# Patient Record
Sex: FEMALE | Race: WHITE | NOT HISPANIC OR LATINO | Employment: OTHER | URBAN - METROPOLITAN AREA
[De-identification: names, ages, dates, MRNs, and addresses within clinical notes are randomized per-mention and may not be internally consistent; named-entity substitution may affect disease eponyms.]

---

## 2018-03-08 ENCOUNTER — RECORDS - HEALTHEAST (OUTPATIENT)
Dept: LAB | Facility: HOSPITAL | Age: 83
End: 2018-03-08

## 2018-03-08 LAB
FASTING STATUS PATIENT QL REPORTED: YES
FOLATE SERPL-MCNC: 6.9 NG/ML
GLUCOSE BLD-MCNC: 126 MG/DL (ref 70–125)
HBA1C MFR BLD: 6.1 % (ref 4.2–6.1)
LYME TOTAL ANTIBODY - HISTORICAL: <0.01 INDEX VALUE
T PALLIDUM AB SER QL: NEGATIVE
TSH SERPL DL<=0.005 MIU/L-ACNC: 2.3 UIU/ML (ref 0.3–5)
VIT B12 SERPL-MCNC: 1052 PG/ML (ref 213–816)

## 2018-03-09 LAB
ALBUMIN PERCENT: 64.7 % (ref 51–67)
ALBUMIN SERPL ELPH-MCNC: 4.7 G/DL (ref 3.2–4.7)
ALPHA 1 PERCENT: 2.3 % (ref 2–4)
ALPHA 2 PERCENT: 11.5 % (ref 5–13)
ALPHA1 GLOB SERPL ELPH-MCNC: 0.2 G/DL (ref 0.1–0.3)
ALPHA2 GLOB SERPL ELPH-MCNC: 0.8 G/DL (ref 0.4–0.9)
B-GLOBULIN SERPL ELPH-MCNC: 0.8 G/DL (ref 0.7–1.2)
BETA PERCENT: 11.6 % (ref 10–17)
GAMMA GLOB SERPL ELPH-MCNC: 0.7 G/DL (ref 0.6–1.4)
GAMMA GLOBULIN PERCENT: 9.9 % (ref 9–20)
PATH ICD:: NORMAL
PATH ICD:: NORMAL
PROT PATTERN SERPL ELPH-IMP: NORMAL
PROT PATTERN SERPL IFE-IMP: NORMAL
PROT SERPL-MCNC: 7.2 G/DL (ref 6–8)
REVIEWING PATHOLOGIST: NORMAL
REVIEWING PATHOLOGIST: NORMAL

## 2018-03-10 LAB
ARSENIC, WHOLE BLOOD: <10 NG/ML
LAB SAMPLE TYPE: NORMAL
LAB STATE REPORTED TO: NORMAL
LEAD, WHOLE BLOOD - HISTORICAL: 2 UG/DL
MERCURY, WHOLE BLOOD - HISTORICAL: <5 NG/ML
METHYLMALONATE SERPL-SCNC: 0.11 UMOL/L (ref 0–0.4)
SPECIMEN STATUS: NORMAL

## 2018-03-12 LAB — ANA SER QL: 0.8 U

## 2021-12-11 ENCOUNTER — LAB REQUISITION (OUTPATIENT)
Dept: LAB | Facility: CLINIC | Age: 86
End: 2021-12-11
Payer: COMMERCIAL

## 2021-12-11 DIAGNOSIS — I10 ESSENTIAL (PRIMARY) HYPERTENSION: ICD-10-CM

## 2021-12-13 ENCOUNTER — TRANSITIONAL CARE UNIT VISIT (OUTPATIENT)
Dept: GERIATRICS | Facility: CLINIC | Age: 86
End: 2021-12-13
Payer: MEDICARE

## 2021-12-13 VITALS
OXYGEN SATURATION: 90 % | TEMPERATURE: 97.6 F | RESPIRATION RATE: 18 BRPM | HEART RATE: 85 BPM | SYSTOLIC BLOOD PRESSURE: 133 MMHG | DIASTOLIC BLOOD PRESSURE: 65 MMHG

## 2021-12-13 DIAGNOSIS — R09.02 HYPOXIA: ICD-10-CM

## 2021-12-13 DIAGNOSIS — J15.69 PNEUMONIA OF LEFT LOWER LOBE DUE TO OTHER AEROBIC GRAM-NEGATIVE BACTERIA (H): Primary | ICD-10-CM

## 2021-12-13 DIAGNOSIS — I10 PRIMARY HYPERTENSION: ICD-10-CM

## 2021-12-13 DIAGNOSIS — I48.0 PAROXYSMAL ATRIAL FIBRILLATION (H): ICD-10-CM

## 2021-12-13 DIAGNOSIS — N18.31 STAGE 3A CHRONIC KIDNEY DISEASE (H): ICD-10-CM

## 2021-12-13 DIAGNOSIS — I50.23 ACUTE ON CHRONIC SYSTOLIC CONGESTIVE HEART FAILURE (H): ICD-10-CM

## 2021-12-13 DIAGNOSIS — I27.20 PULMONARY HYPERTENSION (H): ICD-10-CM
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
## 2021-12-13 DIAGNOSIS — K52.9 CHRONIC DIARRHEA: ICD-10-CM

## 2021-12-13 PROBLEM — I47.19 PAROXYSMAL ATRIAL TACHYCARDIA (H): Status: ACTIVE | Noted: 2021-12-07

## 2021-12-13 PROBLEM — R19.7 DIARRHEA: Status: ACTIVE | Noted: 2021-04-20

## 2021-12-13 PROBLEM — E78.00 HYPERCHOLESTEREMIA: Status: ACTIVE | Noted: 2021-12-13
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
## 2021-12-13 PROBLEM — M20.42 HAMMERTOE OF LEFT FOOT: Status: ACTIVE | Noted: 2019-02-06

## 2021-12-13 PROBLEM — H90.3 SENSORINEURAL HEARING LOSS, BILATERAL: Status: ACTIVE | Noted: 2019-01-17

## 2021-12-13 PROBLEM — Z89.422 ACQUIRED ABSENCE OF OTHER LEFT TOE(S) (H): Status: ACTIVE | Noted: 2020-02-12

## 2021-12-13 PROBLEM — E87.6 HYPOKALEMIA: Status: ACTIVE | Noted: 2021-04-20

## 2021-12-13 LAB
ANION GAP SERPL CALCULATED.3IONS-SCNC: 15 MMOL/L (ref 5–18)
BUN SERPL-MCNC: 15 MG/DL (ref 8–28)
CALCIUM SERPL-MCNC: 9.8 MG/DL (ref 8.5–10.5)
CHLORIDE BLD-SCNC: 91 MMOL/L (ref 98–107)
CO2 SERPL-SCNC: 26 MMOL/L (ref 22–31)
CREAT SERPL-MCNC: 0.75 MG/DL (ref 0.6–1.1)
GFR SERPL CREATININE-BSD FRML MDRD: 72 ML/MIN/1.73M2
GLUCOSE BLD-MCNC: 154 MG/DL (ref 70–125)
POTASSIUM BLD-SCNC: 4.3 MMOL/L (ref 3.5–5)
SODIUM SERPL-SCNC: 132 MMOL/L (ref 136–145)

## 2021-12-13 PROCEDURE — P9603 ONE-WAY ALLOW PRORATED MILES: HCPCS | Performed by: FAMILY MEDICINE

## 2021-12-13 PROCEDURE — 99305 1ST NF CARE MODERATE MDM 35: CPT | Performed by: NURSE PRACTITIONER

## 2021-12-13 PROCEDURE — 80048 BASIC METABOLIC PNL TOTAL CA: CPT | Performed by: FAMILY MEDICINE

## 2021-12-13 PROCEDURE — 36415 COLL VENOUS BLD VENIPUNCTURE: CPT | Performed by: FAMILY MEDICINE

## 2021-12-13 RX ORDER — ACETAMINOPHEN 325 MG/1
325 TABLET ORAL EVERY 4 HOURS PRN
COMMUNITY

## 2021-12-13 RX ORDER — LOSARTAN POTASSIUM 25 MG/1
25 TABLET ORAL DAILY
COMMUNITY

## 2021-12-13 RX ORDER — POTASSIUM CHLORIDE 1500 MG/1
20 TABLET, EXTENDED RELEASE ORAL 2 TIMES DAILY
COMMUNITY

## 2021-12-13 RX ORDER — CALCITONIN SALMON 200 [IU]/.09ML
1 SPRAY, METERED NASAL DAILY
COMMUNITY
End: 2022-01-14

## 2021-12-13 RX ORDER — CEFDINIR 300 MG/1
300 CAPSULE ORAL 2 TIMES DAILY
COMMUNITY
End: 2021-12-28

## 2021-12-13 RX ORDER — FUROSEMIDE 20 MG
20 TABLET ORAL DAILY
COMMUNITY

## 2021-12-13 RX ORDER — DOXYCYCLINE 100 MG/1
100 CAPSULE ORAL 2 TIMES DAILY
COMMUNITY
End: 2022-01-07

## 2021-12-13 RX ORDER — LOPERAMIDE HCL 2 MG
2 CAPSULE ORAL 3 TIMES DAILY PRN
COMMUNITY

## 2021-12-13 RX ORDER — SIMVASTATIN 20 MG
20 TABLET ORAL AT BEDTIME
COMMUNITY

## 2021-12-13 RX ORDER — DICYCLOMINE HCL 20 MG
20 TABLET ORAL 4 TIMES DAILY
COMMUNITY
End: 2021-12-28

## 2021-12-13 RX ORDER — LORAZEPAM 1 MG/1
1 TABLET ORAL 2 TIMES DAILY PRN
COMMUNITY

## 2021-12-13 RX ORDER — CARVEDILOL 12.5 MG/1
12.5 TABLET ORAL 2 TIMES DAILY WITH MEALS
COMMUNITY
End: 2022-01-14

## 2021-12-13 RX ORDER — KETOCONAZOLE 20 MG/G
CREAM TOPICAL 2 TIMES DAILY PRN
COMMUNITY
End: 2021-12-28

## 2021-12-13 RX ORDER — ALBUTEROL SULFATE 0.83 MG/ML
2.5 SOLUTION RESPIRATORY (INHALATION) 4 TIMES DAILY PRN
COMMUNITY
End: 2022-02-03

## 2021-12-14 ENCOUNTER — LAB REQUISITION (OUTPATIENT)
Dept: LAB | Facility: CLINIC | Age: 86
End: 2021-12-14

## 2021-12-14 LAB
ERYTHROCYTE [DISTWIDTH] IN BLOOD BY AUTOMATED COUNT: 13.6 % (ref 10–15)
HCT VFR BLD AUTO: 33.2 % (ref 35–47)
HGB BLD-MCNC: 10.7 G/DL (ref 11.7–15.7)
MCH RBC QN AUTO: 27.2 PG (ref 26.5–33)
MCHC RBC AUTO-ENTMCNC: 32.2 G/DL (ref 31.5–36.5)
MCV RBC AUTO: 85 FL (ref 78–100)
PLATELET # BLD AUTO: 428 10E3/UL (ref 150–450)
PROCALCITONIN SERPL-MCNC: 0.31 NG/ML (ref 0–0.49)
RBC # BLD AUTO: 3.93 10E6/UL (ref 3.8–5.2)
WBC # BLD AUTO: 11.7 10E3/UL (ref 4–11)

## 2021-12-14 PROCEDURE — 84145 PROCALCITONIN (PCT): CPT | Performed by: NURSE PRACTITIONER

## 2021-12-14 PROCEDURE — 85027 COMPLETE CBC AUTOMATED: CPT | Performed by: NURSE PRACTITIONER

## 2021-12-14 RX ORDER — FLUTICASONE PROPIONATE 50 MCG
2 SPRAY, SUSPENSION (ML) NASAL DAILY
COMMUNITY

## 2021-12-15 NOTE — PROGRESS NOTES
[facility] GERIATRIC SERVICES    Code Status:  FULL CODE   Visit Type:   Chief Complaint   Patient presents with     Nursing Home Acute     TCU Admmission     Facility:  [facility] ([facility]) [[id number]]           History of Present Illness: Maranda Downing is a 86 year old female who I am seeing today for admit to the TCU.  Patient recently hospitalized at [facility] on 12/7/2021 secondary to a fall.  Past medical history includes hyperlipidemia, hypertension, GERD, diverticulosis, chronic diarrhea, CAD, pulmonary hypertension, anxiety and stage III kidney disease.  Patient had a mechanical fall.  She underwent head CT which showed mild diffuse parenchymal volume loss and white matter changes most likely due to chronic microvascular ischemic disease.  Patient also underwent chest x-ray secondary to hypoxia with O2 sats in the 80s.  She was found to have a left lower lobe pneumonia.  She was placed on Omnicef and doxycycline.  COVID-19 negative.  Patient also had some tachycardia.  EKG showed significant atrial fib with rapid ventricular response with premature ventricular and aberrantly conducted complexes left axis deviation and right bundle branch block with T wave abnormality.  Cardiology was consulted.  Patient was treated with Lasix for possible congestive heart failure as well.  Magnesium and potassium supplemented.  Patient did continue with hypoxia which was suspected to be secondary to pulmonary hypertension and diastolic congestive heart failure along with possible pneumonia.  Cardiology did sign off.  Recommendation was to follow-up in the pulmonary clinic in 1 week.  Patient with chronic diarrhea.  She continues on Bentyl and Imodium.  Family had noticed some increasing cognitive impairment over the last few months.    Today patient sitting up in wheelchair.  Her daughter is present on exam.  Patient had some continued hypoxia over the weekend with sats noted to be down in the 70s.  She  continues on 4 to 5 L of oxygen.  She does sound somewhat stuffy in the upper sinuses on today's visit.  She has some diminished sounds in the bases.  Dry cough on exam.  She does have underlying anxiety.  With talking she does appear to get anxious.  She does take lorazepam at home and continues on that twice daily as well as sertraline.  Patient is currently afebrile.  She continues on Omnicef and doxycycline.  Last CBC showed a normal white blood cell count.  Today's BMP unremarkable.  She does have as needed nebulizers.  Nursing staff have been utilizing this throughout the weekend.  CHF.  Patient continues on Lasix.  Trace lower extremity edema.  Weights over the weekend have been stable.  Blood pressure appears satisfactory.  Patient reports the diarrhea has improved.  Again continues on Bentyl and as needed Imodium.  Patient tolerated therapy today.  Sats dropped in the mid 80s however did rebound with rest.  I did discuss at length patient's overall condition.  Also discussed possible need for further hospitalization if sats do not stay above 90.    Active Ambulatory Problems     Diagnosis Date Noted     Controlled type 2 diabetes mellitus with complication, without long-term current use of insulin (H) 02/11/2013     Chronic diarrhea 12/07/2021     Anxiety state 01/06/2011     Allergic rhinitis 05/22/2009     AK (actinic keratosis) 01/24/2013     Acquired absence of other left toe(s) (H) 02/12/2020     Abdominal pain 06/16/2014     Coronary artery disease of native artery of native heart with stable angina pectoris (H) 05/20/2009     Cystitis cystica 05/22/2009     Diarrhea 04/20/2021     Dysesthesia 05/22/2009     Fall 12/07/2021     Fatty liver 01/16/2012     Fibromyalgia 08/02/2012     Gastroesophageal reflux disease without esophagitis 07/28/2011     Genital herpes 03/13/2015     Hammertoe of left foot 02/06/2019     Hypercalcemia 01/24/2013     Hypercholesteremia 12/13/2021     Hypertension 12/13/2021      Hypokalemia 04/20/2021     Hypoxia 12/07/2021     Osteoporosis 05/22/2009     OA (osteoarthritis) of knee 03/14/2013     Nausea & vomiting 06/16/2014     Mild cognitive impairment 06/29/2009     Midline thoracic back pain 06/23/2015     Irritable bowel syndrome 05/22/2009     Incontinence of feces 03/12/2014     Vitamin B 12 deficiency 05/22/2009     Stage 3a chronic kidney disease (H) 01/29/2021     Sensorineural hearing loss, bilateral 01/17/2019     Rosacea 05/22/2009     Right bundle branch block 03/20/2014     Pulmonary hypertension (H) 01/29/2021     PPD positive 06/29/2009     Paroxysmal atrial tachycardia (H) 12/07/2021     Resolved Ambulatory Problems     Diagnosis Date Noted     No Resolved Ambulatory Problems     No Additional Past Medical History       Current Outpatient Medications:      acetaminophen (TYLENOL) 325 MG tablet, Take 325 mg by mouth every 4 hours as needed for mild pain, Disp: , Rfl:      albuterol (PROVENTIL) (2.5 MG/3ML) 0.083% neb solution, Take 2.5 mg by nebulization 4 times daily as needed for shortness of breath / dyspnea or wheezing Also give scheduled 3 times daily, Disp: , Rfl:      calcitonin, salmon, (MIACALCIN) 200 UNIT/ACT nasal spray, Spray 1 spray into one nostril alternating nostrils daily Alternate nostril each day., Disp: , Rfl:      carvedilol (COREG) 12.5 MG tablet, Take 12.5 mg by mouth 2 times daily (with meals), Disp: , Rfl:      cefdinir (OMNICEF) 300 MG capsule, Take 300 mg by mouth 2 times daily, Disp: , Rfl:      dicyclomine (BENTYL) 20 MG tablet, Take 20 mg by mouth 4 times daily, Disp: , Rfl:      doxycycline monohydrate (MONODOX) 100 MG capsule, Take 100 mg by mouth 2 times daily, Disp: , Rfl:      fluticasone (FLONASE) 50 MCG/ACT nasal spray, 2 sprays daily, Disp: , Rfl:      fluticasone (FLOVENT DISKUS) 50 MCG/BLIST inhaler, Inhale 1 puff into the lungs daily as needed, Disp: , Rfl:      furosemide (LASIX) 20 MG tablet, Take 20 mg by mouth daily, Disp: ,  Rfl:      ketoconazole (NIZORAL) 2 % external cream, Apply topically 2 times daily as needed for itching, Disp: , Rfl:      loperamide (IMODIUM) 2 MG capsule, Take 2 mg by mouth 3 times daily as needed for diarrhea, Disp: , Rfl:      LORazepam (ATIVAN) 1 MG tablet, Take 1 mg by mouth 2 times daily as needed for anxiety, Disp: , Rfl:      losartan (COZAAR) 25 MG tablet, Take 25 mg by mouth 2 times daily, Disp: , Rfl:      potassium chloride ER (K-TAB) 20 MEQ CR tablet, Take 20 mEq by mouth 2 times daily, Disp: , Rfl:      sertraline (ZOLOFT) 50 MG tablet, Take 50 mg by mouth daily, Disp: , Rfl:      simvastatin (ZOCOR) 20 MG tablet, Take 20 mg by mouth At Bedtime, Disp: , Rfl:   Allergies   Allergen Reactions     Codeine Nausea     Hydrocodone      Other reaction(s): GI Upset  nausea     Levofloxacin      Other reaction(s): Intolerance-Can't Take  Made leg pain worse.     Lisinopril Cough     Morphine Nausea and Vomiting     Sulfa Drugs Nausea     Penicillins Rash       All Meds and Allergies reviewed in the record at the facility and is the most up-to-date    REVIEW OF SYSTEMS:   Review of Systems  No fevers or chills. No headache, lightheadedness or dizziness. +SOB, patient continues on oxygen at 4 L, no chest pains or palpitations. Appetite is fair. No nausea, vomiting, constipation.  Chronic diarrhea on Bentyl and as needed Imodium. No dysuria, frequency, burning or pain with urination.  Chronic anxiety on lorazepam and sertraline.  Otherwise review of systems are negative.     PHYSICAL EXAMINATION:  Physical Exam     Vital signs: /65   Pulse 85   Temp 97.6  F (36.4  C)   Resp 18   SpO2 90%   General: Awake, Alert, oriented x1, appropriately, follows simple commands, conversant  HEENT:PERRLA, Pink conjunctiva, anicteric sclerae, dry oral mucosa  NECK: Supple  CVS:  S1  S2, without murmur or gallop.   LUNG: Clear to auscultation, slightly diminished in the bases.  Continues on oxygen at 4 L.  Hypoxia  noted with exertion.  BACK: No kyphosis of the thoracic spine  ABDOMEN: Soft, obese, nontender to palpation, with positive bowel sounds  EXTREMITIES: Moves both upper and lower extremities with diffuse weakness, no pedal edema, no calf tenderness  SKIN: Warm and dry, no rashes or erythema noted  NEUROLOGIC: Intact, pulses palpable  PSYCHIATRIC: Cognitive impairment noted.  Word finding difficulty.      Labs:  All labs reviewed in the nursing home record and Epic   @  Lab Results   Component Value Date    WBC 11.7 12/14/2021     Lab Results   Component Value Date    RBC 3.93 12/14/2021     Lab Results   Component Value Date    HGB 10.7 12/14/2021     Lab Results   Component Value Date    HCT 33.2 12/14/2021     Lab Results   Component Value Date    MCV 85 12/14/2021     Lab Results   Component Value Date    MCH 27.2 12/14/2021     Lab Results   Component Value Date    MCHC 32.2 12/14/2021     Lab Results   Component Value Date    RDW 13.6 12/14/2021     Lab Results   Component Value Date     12/14/2021        @Last Comprehensive Metabolic Panel:  Sodium   Date Value Ref Range Status   12/13/2021 132 (L) 136 - 145 mmol/L Final     Potassium   Date Value Ref Range Status   12/13/2021 4.3 3.5 - 5.0 mmol/L Final     Chloride   Date Value Ref Range Status   12/13/2021 91 (L) 98 - 107 mmol/L Final     Carbon Dioxide (CO2)   Date Value Ref Range Status   12/13/2021 26 22 - 31 mmol/L Final     Anion Gap   Date Value Ref Range Status   12/13/2021 15 5 - 18 mmol/L Final     Glucose   Date Value Ref Range Status   12/13/2021 154 (H) 70 - 125 mg/dL Final     Urea Nitrogen   Date Value Ref Range Status   12/13/2021 15 8 - 28 mg/dL Final     Creatinine   Date Value Ref Range Status   12/13/2021 0.75 0.60 - 1.10 mg/dL Final     GFR Estimate   Date Value Ref Range Status   12/13/2021 72 >60 mL/min/1.73m2 Final     Comment:     As of July 11, 2021, eGFR is calculated by the CKD-EPI creatinine equation, without race adjustment.  eGFR can be influenced by muscle mass, exercise, and diet. The reported eGFR is an estimation only and is only applicable if the renal function is stable.     Calcium   Date Value Ref Range Status   12/13/2021 9.8 8.5 - 10.5 mg/dL Final         Assessment/Plan:    ICD-10-CM    1. Pneumonia of left lower lobe due to other aerobic gram-negative bacteria (H)  J15.6  currently afebrile.  Patient continues on Omnicef and cefdinir.  Continues with hypoxia.  O2 on 4 L.  Repeat chest x-ray two-view.  Schedule nebulizer 3 times daily.  Continue every 4 hours as needed.  Follow-up CBC, pro calcitonin and BMP.   2. Fall, subsequent encounter  W19.XXXD  continue with therapy.  PT OT eval and treat.     3. Paroxysmal atrial fibrillation (H)  I48.0  rate controlled with carvedilol.   4. Acute on chronic systolic congestive heart failure (H)  I50.23  continues on Lasix.  No lower extremity edema.  Continue daily weights.  Follow-up BMP.   5. Primary hypertension  I10  BP satisfactory.   6. Pulmonary hypertension (H)  I27.20 suggested follow-up with pulmonology in 1 week if no improvement.   7. Stage 3a chronic kidney disease (H)  N18.31 creatinine 0.75.  Creatinine 0.75.  Follow-up BMP.   8. Hypoxia  R09.02  encourage cough and deep breathing.  I-S every 4 hours while awake.  Attempt to wean O2.  Flonase to each nostril 2 sprays twice daily.  She does have some nasal stuffiness which may be contributory.   9. Chronic diarrhea  K52.9  continues on Bentyl, fiber and as needed Imodium.           45 minutes spent of which greater than 50% was face to face communication with the patient and her daughter about above plan of care including management of pneumonia, hypoxia and continued monitoring.    This note has been dictated using voice recognition software. Any grammatical or context distortions are unintentional and inherent to the software    Electronically signed by: Teresa Villa CNP

## 2021-12-26 ENCOUNTER — LAB REQUISITION (OUTPATIENT)
Dept: LAB | Facility: CLINIC | Age: 86
End: 2021-12-26
Payer: COMMERCIAL

## 2021-12-26 ENCOUNTER — LAB REQUISITION (OUTPATIENT)
Dept: LAB | Facility: CLINIC | Age: 86
End: 2021-12-26

## 2021-12-26 DIAGNOSIS — I47.9 PAROXYSMAL TACHYCARDIA, UNSPECIFIED (H): ICD-10-CM

## 2021-12-26 DIAGNOSIS — J18.9 PNEUMONIA, UNSPECIFIED ORGANISM: ICD-10-CM

## 2021-12-26 DIAGNOSIS — J30.2 OTHER SEASONAL ALLERGIC RHINITIS: ICD-10-CM

## 2021-12-26 DIAGNOSIS — N39.0 URINARY TRACT INFECTION, SITE NOT SPECIFIED: ICD-10-CM

## 2021-12-26 DIAGNOSIS — K58.9 IRRITABLE BOWEL SYNDROME WITHOUT DIARRHEA: ICD-10-CM

## 2021-12-26 DIAGNOSIS — L30.9 DERMATITIS, UNSPECIFIED: ICD-10-CM

## 2021-12-26 DIAGNOSIS — Z11.1 ENCOUNTER FOR SCREENING FOR RESPIRATORY TUBERCULOSIS: ICD-10-CM

## 2021-12-26 DIAGNOSIS — K52.9 NONINFECTIVE GASTROENTERITIS AND COLITIS, UNSPECIFIED: ICD-10-CM

## 2021-12-26 LAB
ALBUMIN UR-MCNC: 10 MG/DL
APPEARANCE UR: ABNORMAL
BACTERIA #/AREA URNS HPF: ABNORMAL /HPF
BILIRUB UR QL STRIP: NEGATIVE
CAOX CRY #/AREA URNS HPF: ABNORMAL /HPF
COLOR UR AUTO: YELLOW
GLUCOSE UR STRIP-MCNC: NEGATIVE MG/DL
HGB UR QL STRIP: NEGATIVE
KETONES UR STRIP-MCNC: NEGATIVE MG/DL
LEUKOCYTE ESTERASE UR QL STRIP: ABNORMAL
MUCOUS THREADS #/AREA URNS LPF: PRESENT /LPF
NITRATE UR QL: POSITIVE
PH UR STRIP: 5.5 [PH] (ref 5–7)
RBC URINE: 1 /HPF
SP GR UR STRIP: 1.01 (ref 1–1.03)
SQUAMOUS EPITHELIAL: <1 /HPF
TRANSITIONAL EPI: <1 /HPF
UROBILINOGEN UR STRIP-MCNC: <2 MG/DL
WBC URINE: 9 /HPF
YEAST #/AREA URNS HPF: ABNORMAL /HPF
YEAST #/AREA URNS HPF: ABNORMAL /HPF

## 2021-12-26 PROCEDURE — 81001 URINALYSIS AUTO W/SCOPE: CPT | Performed by: FAMILY MEDICINE

## 2021-12-26 PROCEDURE — 87086 URINE CULTURE/COLONY COUNT: CPT | Performed by: FAMILY MEDICINE

## 2021-12-27 VITALS
SYSTOLIC BLOOD PRESSURE: 114 MMHG | WEIGHT: 163.4 LBS | OXYGEN SATURATION: 95 % | HEIGHT: 62 IN | DIASTOLIC BLOOD PRESSURE: 72 MMHG | HEART RATE: 87 BPM | BODY MASS INDEX: 30.07 KG/M2 | TEMPERATURE: 97.5 F | RESPIRATION RATE: 14 BRPM

## 2021-12-27 PROBLEM — R79.89 ELEVATED TROPONIN: Status: ACTIVE | Noted: 2021-12-14

## 2021-12-27 PROBLEM — K52.9 NONINFECTIOUS GASTROENTERITIS: Status: ACTIVE | Noted: 2021-04-25

## 2021-12-27 PROBLEM — K57.90 DIVERTICULAR DISEASE: Status: ACTIVE | Noted: 2021-12-27

## 2021-12-27 PROBLEM — K59.00 CONSTIPATION: Status: ACTIVE | Noted: 2021-04-20

## 2021-12-27 ASSESSMENT — MIFFLIN-ST. JEOR: SCORE: 1134.43

## 2021-12-28 ENCOUNTER — LAB REQUISITION (OUTPATIENT)
Dept: LAB | Facility: CLINIC | Age: 86
End: 2021-12-28
Payer: COMMERCIAL

## 2021-12-28 ENCOUNTER — TRANSITIONAL CARE UNIT VISIT (OUTPATIENT)
Dept: GERIATRICS | Facility: CLINIC | Age: 86
End: 2021-12-28
Payer: MEDICARE

## 2021-12-28 DIAGNOSIS — J96.21 ACUTE AND CHRONIC RESPIRATORY FAILURE WITH HYPOXIA (H): ICD-10-CM

## 2021-12-28 DIAGNOSIS — I50.33 ACUTE ON CHRONIC DIASTOLIC CONGESTIVE HEART FAILURE (H): Primary | ICD-10-CM

## 2021-12-28 DIAGNOSIS — N18.30 STAGE 3 CHRONIC KIDNEY DISEASE, UNSPECIFIED WHETHER STAGE 3A OR 3B CKD (H): ICD-10-CM

## 2021-12-28 DIAGNOSIS — Z87.01 HISTORY OF ASPIRATION PNEUMONIA: ICD-10-CM

## 2021-12-28 DIAGNOSIS — G31.84 MILD COGNITIVE IMPAIRMENT: ICD-10-CM

## 2021-12-28 DIAGNOSIS — I10 ESSENTIAL (PRIMARY) HYPERTENSION: ICD-10-CM

## 2021-12-28 DIAGNOSIS — R33.9 URINARY RETENTION: ICD-10-CM

## 2021-12-28 LAB — BACTERIA UR CULT: ABNORMAL

## 2021-12-28 PROCEDURE — P9603 ONE-WAY ALLOW PRORATED MILES: HCPCS | Performed by: FAMILY MEDICINE

## 2021-12-28 PROCEDURE — 86481 TB AG RESPONSE T-CELL SUSP: CPT | Performed by: FAMILY MEDICINE

## 2021-12-28 PROCEDURE — 36415 COLL VENOUS BLD VENIPUNCTURE: CPT | Performed by: FAMILY MEDICINE

## 2021-12-28 PROCEDURE — 99305 1ST NF CARE MODERATE MDM 35: CPT | Performed by: FAMILY MEDICINE

## 2021-12-28 RX ORDER — LACTOBACILLUS RHAMNOSUS GG 10B CELL
1 CAPSULE ORAL 2 TIMES DAILY
COMMUNITY
End: 2022-02-03

## 2021-12-28 NOTE — PROGRESS NOTES
[facility] GERIATRIC SERVICES    Facility:  [facility] ([facility]) [[id number]]  Code Status: DNR      CHIEF COMPLAINT/REASON FOR VISIT:  Chief Complaint   Patient presents with     Hospital F/U       HPI:   Maranda is a 86 year old female who was recently admitted to the hospital on 12/14/2020 when she had a previous hospital admission for pneumonia and was admitted from 12 7-12 10.  At that time she was diagnosed aspiration pneumonia did have a fall without fractures.  She finished a course of antibiotics at this time return to the hospital 4 days after discharge working diagnosis of aspiration pneumonia.  She also had acute chronic diastolic heart failure she was diuresed at this time and discharged back to the TCU on 4 L of oxygen she is currently on 3 at this time satting fine.  She does have persistent hypoxia and seems to be improving.  They did translate to oral Lasix 20 mg daily and Coreg 12.5 mg daily.  There is no significant change in her x-ray at this time pulmonology did follow.    She was then treated appropriate transferred here to the TCU in stable condition.  This morning she is lying in bed she is comfortable.  Not a very good historian but says she did have some shortness of breath yesterday and gets shortness of breath with exertion.  O2 sats look good at this time at 96% with an adequate respiratory rate.  She also has a Driver catheter in at this time I do not know what the history of this is however it does say it was removed and suspect patient is a chronic retainer due to atonic bladder.  Has episodes of incontinence.  Bentyl was discontinued secondary concerns for side effect of urinary retention at this time and she still maintains a Driver catheter at this time.    She otherwise has no pain issues and is pretty comfortable.  She does not appear to be fluid overloaded at this time.    Past Medical History:  History reviewed. No pertinent past medical history.        Surgical History:  History  reviewed. No pertinent surgical history.    Family History:   History reviewed. No pertinent family history.    Social History:    Social History     Socioeconomic History     Marital status:      Spouse name: None     Number of children: None     Years of education: None     Highest education level: None   Occupational History     None   Tobacco Use     Smoking status: Unknown If Ever Smoked     Smokeless tobacco: Never Used   Substance and Sexual Activity     Alcohol use: None     Drug use: None     Sexual activity: None   Other Topics Concern     None   Social History Narrative     None     Social Determinants of Health     Financial Resource Strain: Not on file   Food Insecurity: Not on file   Transportation Needs: Not on file   Physical Activity: Not on file   Stress: Not on file   Social Connections: Not on file   Intimate Partner Violence: Not on file   Housing Stability: Not on file       Post Discharge Medication Reconciliation Status: discharge medications reconciled, continue medications without change    Current Outpatient Medications   Medication Sig     acetaminophen (TYLENOL) 325 MG tablet Take 325 mg by mouth every 4 hours as needed for mild pain     albuterol (PROVENTIL) (2.5 MG/3ML) 0.083% neb solution Take 2.5 mg by nebulization 4 times daily as needed for shortness of breath / dyspnea or wheezing Also give scheduled 3 times daily     calcitonin, salmon, (MIACALCIN) 200 UNIT/ACT nasal spray Spray 1 spray into one nostril alternating nostrils daily Alternate nostril each day.     carvedilol (COREG) 12.5 MG tablet Take 12.5 mg by mouth 2 times daily (with meals)     doxycycline monohydrate (MONODOX) 100 MG capsule Take 100 mg by mouth 2 times daily     fluticasone (FLONASE) 50 MCG/ACT nasal spray 2 sprays daily     furosemide (LASIX) 20 MG tablet Take 20 mg by mouth daily     lactobacillus rhamnosus, GG, (CULTURELL) capsule Take 1 capsule by mouth 2 times daily     loperamide (IMODIUM) 2 MG  capsule Take 2 mg by mouth 3 times daily as needed for diarrhea     LORazepam (ATIVAN) 1 MG tablet Take 1 mg by mouth 2 times daily as needed for anxiety     losartan (COZAAR) 25 MG tablet Take 25 mg by mouth 2 times daily     potassium chloride ER (K-TAB) 20 MEQ CR tablet Take 20 mEq by mouth 2 times daily     sertraline (ZOLOFT) 50 MG tablet Take 50 mg by mouth daily     simvastatin (ZOCOR) 20 MG tablet Take 20 mg by mouth At Bedtime     No current facility-administered medications for this visit.       REVIEW OF SYSTEM: Patient claims she has shortness of breath with exertion but no chest pain or tightness.  She has no fevers chills nausea vomit diarrhea change in vision hearing taste or smell weakness one-sided other.  The remainder review of systems is negative.    PHYSICAL EXAM: Patient is alert pleasant does not appear to be in acute distress head is normocephalic and atraumatic sclera conjunctiva is clear oromucosa was moist nose no discharge.  Heart sounds were irregularly irregular with adequate rate control.  Lungs did show diffuse crackles throughout the lung field.  But moving air well at this time and no signs of any wheezing.    Extremities did show some swelling but no signs of any pitting edema or lymphedema at this time.  Neurologic exam is nonfocal and affect was pleasant.        LABS: Hospital labs are as follows; C. difficile is negative in the hospital.  Sodium is 135, potassium 4.4, CO2 is 27, calcium is 9.6, BUN is 33, creatinine is 1.11 but was as high as 1.41 in the hospital.  And also the potassium was up to 5.7 in the hospital on 12/20/2021 C. difficile was negative.    Vitals; blood pressure 135/84    Pulse is 109    Temperature is 97.6    Respirations 14    O2 sats 96%.      ASSESSMENT:    Encounter Diagnoses   Name Primary?     Acute on chronic diastolic congestive heart failure (H) Yes     Acute and chronic respiratory failure with hypoxia (H)      Urinary retention      History of  aspiration pneumonia      Stage 3 chronic kidney disease, unspecified whether stage 3a or 3b CKD (H)      Mild cognitive impairment         PLAN: Plan at this time #1 blood sugars daily at alternating times secondary diagnosis of diabetes.    We will check a basic metabolic profile Thursday secondary chronic kidney disease.    Speech therapy evaluate and treat second aspiration pneumonia and will also perform cognitive testing on her.    Pulses will be done manually every 8 hours.    We will check weights daily and staff will report to me 2 pound weight changes of any direction.    We will give trial of O2 2.5 L I did turn that down today and I did discuss with the nurse that they will check for low oxygen levels for possibly going up however I am confident that I can take her off the O2.    I also set up urology consult for voiding trial still unclear at this time on the history of the Driver catheter.  However she did not have it a month ago according to her.    I will continue to monitor above medical problems and no other changes to care plan at this time.  Care plan was reviewed and is appropriate.        Electronically signed by: TYRELL CARRIZALES DO

## 2021-12-28 NOTE — LETTER
12/28/2021        RE: Maranda Downing  [address]  [place] [address]        [facility] GERIATRIC SERVICES    Facility:  [facility] [place] ([facility]) [[id number]]  Code Status: DNR      CHIEF COMPLAINT/REASON FOR VISIT:  Chief Complaint   Patient presents with     Hospital F/U       HPI:   Maranda is a 86 year old female who was recently admitted to the hospital on 12/14/2020 when she had a previous hospital admission for pneumonia and was admitted from 12 7-12 10.  At that time she was diagnosed aspiration pneumonia did have a fall without fractures.  She finished a course of antibiotics at this time return to the hospital 4 days after discharge working diagnosis of aspiration pneumonia.  She also had acute chronic diastolic heart failure she was diuresed at this time and discharged back to the TCU on 4 L of oxygen she is currently on 3 at this time satting fine.  She does have persistent hypoxia and seems to be improving.  They did translate to oral Lasix 20 mg daily and Coreg 12.5 mg daily.  There is no significant change in her x-ray at this time pulmonology did follow.    She was then treated appropriate transferred here to the TCU in stable condition.  This morning she is lying in bed she is comfortable.  Not a very good historian but says she did have some shortness of breath yesterday and gets shortness of breath with exertion.  O2 sats look good at this time at 96% with an adequate respiratory rate.  She also has a Driver catheter in at this time I do not know what the history of this is however it does say it was removed and suspect patient is a chronic retainer due to atonic bladder.  Has episodes of incontinence.  Bentyl was discontinued secondary concerns for side effect of urinary retention at this time and she still maintains a Driver catheter at this time.    She otherwise has no pain issues and is pretty comfortable.  She does not appear to be fluid overloaded at this time.    Past Medical  History:  History reviewed. No pertinent past medical history.        Surgical History:  History reviewed. No pertinent surgical history.    Family History:   History reviewed. No pertinent family history.    Social History:    Social History     Socioeconomic History     Marital status:      Spouse name: None     Number of children: None     Years of education: None     Highest education level: None   Occupational History     None   Tobacco Use     Smoking status: Unknown If Ever Smoked     Smokeless tobacco: Never Used   Substance and Sexual Activity     Alcohol use: None     Drug use: None     Sexual activity: None   Other Topics Concern     None   Social History Narrative     None     Social Determinants of Health     Financial Resource Strain: Not on file   Food Insecurity: Not on file   Transportation Needs: Not on file   Physical Activity: Not on file   Stress: Not on file   Social Connections: Not on file   Intimate Partner Violence: Not on file   Housing Stability: Not on file       Post Discharge Medication Reconciliation Status: discharge medications reconciled, continue medications without change    Current Outpatient Medications   Medication Sig     acetaminophen (TYLENOL) 325 MG tablet Take 325 mg by mouth every 4 hours as needed for mild pain     albuterol (PROVENTIL) (2.5 MG/3ML) 0.083% neb solution Take 2.5 mg by nebulization 4 times daily as needed for shortness of breath / dyspnea or wheezing Also give scheduled 3 times daily     calcitonin, salmon, (MIACALCIN) 200 UNIT/ACT nasal spray Spray 1 spray into one nostril alternating nostrils daily Alternate nostril each day.     carvedilol (COREG) 12.5 MG tablet Take 12.5 mg by mouth 2 times daily (with meals)     doxycycline monohydrate (MONODOX) 100 MG capsule Take 100 mg by mouth 2 times daily     fluticasone (FLONASE) 50 MCG/ACT nasal spray 2 sprays daily     furosemide (LASIX) 20 MG tablet Take 20 mg by mouth daily     lactobacillus  rhamnosus, GG, (CULTURELL) capsule Take 1 capsule by mouth 2 times daily     loperamide (IMODIUM) 2 MG capsule Take 2 mg by mouth 3 times daily as needed for diarrhea     LORazepam (ATIVAN) 1 MG tablet Take 1 mg by mouth 2 times daily as needed for anxiety     losartan (COZAAR) 25 MG tablet Take 25 mg by mouth 2 times daily     potassium chloride ER (K-TAB) 20 MEQ CR tablet Take 20 mEq by mouth 2 times daily     sertraline (ZOLOFT) 50 MG tablet Take 50 mg by mouth daily     simvastatin (ZOCOR) 20 MG tablet Take 20 mg by mouth At Bedtime     No current facility-administered medications for this visit.       REVIEW OF SYSTEM: Patient claims she has shortness of breath with exertion but no chest pain or tightness.  She has no fevers chills nausea vomit diarrhea change in vision hearing taste or smell weakness one-sided other.  The remainder review of systems is negative.    PHYSICAL EXAM: Patient is alert pleasant does not appear to be in acute distress head is normocephalic and atraumatic sclera conjunctiva is clear oromucosa was moist nose no discharge.  Heart sounds were irregularly irregular with adequate rate control.  Lungs did show diffuse crackles throughout the lung field.  But moving air well at this time and no signs of any wheezing.    Extremities did show some swelling but no signs of any pitting edema or lymphedema at this time.  Neurologic exam is nonfocal and affect was pleasant.        LABS: Hospital labs are as follows; C. difficile is negative in the hospital.  Sodium is 135, potassium 4.4, CO2 is 27, calcium is 9.6, BUN is 33, creatinine is 1.11 but was as high as 1.41 in the hospital.  And also the potassium was up to 5.7 in the hospital on 12/20/2021 C. difficile was negative.    Vitals; blood pressure 135/84    Pulse is 109    Temperature is 97.6    Respirations 14    O2 sats 96%.      ASSESSMENT:    Encounter Diagnoses   Name Primary?     Acute on chronic diastolic congestive heart failure (H)  Yes     Acute and chronic respiratory failure with hypoxia (H)      Urinary retention      History of aspiration pneumonia      Stage 3 chronic kidney disease, unspecified whether stage 3a or 3b CKD (H)      Mild cognitive impairment         PLAN: Plan at this time #1 blood sugars daily at alternating times secondary diagnosis of diabetes.    We will check a basic metabolic profile Thursday secondary chronic kidney disease.    Speech therapy evaluate and treat second aspiration pneumonia and will also perform cognitive testing on her.    Pulses will be done manually every 8 hours.    We will check weights daily and staff will report to me 2 pound weight changes of any direction.    We will give trial of O2 2.5 L I did turn that down today and I did discuss with the nurse that they will check for low oxygen levels for possibly going up however I am confident that I can take her off the O2.    I also set up urology consult for voiding trial still unclear at this time on the history of the Driver catheter.  However she did not have it a month ago according to her.    I will continue to monitor above medical problems and no other changes to care plan at this time.  Care plan was reviewed and is appropriate.        Electronically signed by: TYRELL ACRRIZALES DO        Sincerely,        TYRELL CARRIZALES DO

## 2021-12-29 ENCOUNTER — LAB REQUISITION (OUTPATIENT)
Dept: LAB | Facility: CLINIC | Age: 86
End: 2021-12-29
Payer: COMMERCIAL

## 2021-12-29 ENCOUNTER — TRANSITIONAL CARE UNIT VISIT (OUTPATIENT)
Dept: GERIATRICS | Facility: CLINIC | Age: 86
End: 2021-12-29
Payer: MEDICARE

## 2021-12-29 VITALS
OXYGEN SATURATION: 95 % | BODY MASS INDEX: 29.81 KG/M2 | HEART RATE: 80 BPM | RESPIRATION RATE: 18 BRPM | DIASTOLIC BLOOD PRESSURE: 62 MMHG | HEIGHT: 62 IN | SYSTOLIC BLOOD PRESSURE: 103 MMHG | TEMPERATURE: 97.3 F | WEIGHT: 162 LBS

## 2021-12-29 DIAGNOSIS — N18.30 STAGE 3 CHRONIC KIDNEY DISEASE, UNSPECIFIED WHETHER STAGE 3A OR 3B CKD (H): ICD-10-CM

## 2021-12-29 DIAGNOSIS — J96.21 ACUTE AND CHRONIC RESPIRATORY FAILURE WITH HYPOXIA (H): ICD-10-CM

## 2021-12-29 DIAGNOSIS — R33.9 URINARY RETENTION: Primary | ICD-10-CM

## 2021-12-29 DIAGNOSIS — N30.00 ACUTE CYSTITIS WITHOUT HEMATURIA: ICD-10-CM

## 2021-12-29 DIAGNOSIS — Z87.01 HISTORY OF ASPIRATION PNEUMONIA: ICD-10-CM

## 2021-12-29 DIAGNOSIS — I48.0 PAROXYSMAL ATRIAL FIBRILLATION (H): ICD-10-CM

## 2021-12-29 DIAGNOSIS — I50.33 ACUTE ON CHRONIC DIASTOLIC CONGESTIVE HEART FAILURE (H): ICD-10-CM

## 2021-12-29 DIAGNOSIS — I10 ESSENTIAL (PRIMARY) HYPERTENSION: ICD-10-CM

## 2021-12-29 LAB
GAMMA INTERFERON BACKGROUND BLD IA-ACNC: 0.06 IU/ML
M TB IFN-G BLD-IMP: NEGATIVE
M TB IFN-G CD4+ BCKGRND COR BLD-ACNC: 6.73 IU/ML
MITOGEN IGNF BCKGRD COR BLD-ACNC: 0 IU/ML
MITOGEN IGNF BCKGRD COR BLD-ACNC: 0.01 IU/ML
QUANTIFERON MITOGEN: 6.79 IU/ML
QUANTIFERON NIL TUBE: 0.06 IU/ML
QUANTIFERON TB1 TUBE: 0.07 IU/ML
QUANTIFERON TB2 TUBE: 0.06

## 2021-12-29 PROCEDURE — 99309 SBSQ NF CARE MODERATE MDM 30: CPT | Performed by: FAMILY MEDICINE

## 2021-12-29 ASSESSMENT — MIFFLIN-ST. JEOR: SCORE: 1128.08

## 2021-12-29 NOTE — PROGRESS NOTES
[facility] GERIATRIC SERVICES    Facility:  [facility] [place] ([facility]) [[id number]]  Code Status: DNR      CHIEF COMPLAINT/REASON FOR VISIT:  Chief Complaint   Patient presents with     RECHECK       HISTORY:      HPI: Maranda is a 86 year old female who I am seen today for urinary tract infection.  She does have a chronic Driver catheter in at this time please see my note from yesterday for further details.  She has pneumonia and she also is aspiration she continues with 3 L of oxygen a try was done yesterday 2.5 L she did make it.  She has had some symptoms of urinary tract infection with some urgency does have a Driver catheter in but seems to be moving urine well urine is darker yellow without any signs of blood.  She is comfortable yesterday however she is symptomatic.    She should get short of breath with exertion but otherwise no other issues.    History reviewed. No pertinent past medical history.          History reviewed. No pertinent family history.  Social History     Socioeconomic History     Marital status:      Spouse name: Not on file     Number of children: Not on file     Years of education: Not on file     Highest education level: Not on file   Occupational History     Not on file   Tobacco Use     Smoking status: Unknown If Ever Smoked     Smokeless tobacco: Never Used   Substance and Sexual Activity     Alcohol use: Not on file     Drug use: Not on file     Sexual activity: Not on file   Other Topics Concern     Not on file   Social History Narrative     Not on file     Social Determinants of Health     Financial Resource Strain: Not on file   Food Insecurity: Not on file   Transportation Needs: Not on file   Physical Activity: Not on file   Stress: Not on file   Social Connections: Not on file   Intimate Partner Violence: Not on file   Housing Stability: Not on file         REVIEW OF SYSTEM: Positive suprapubic tenderness with urgency with urination.  He does a Driver catheter in.  She is  shortness of breath with exertion but none at rest and denies any fevers chills nausea vomit diarrhea change in vision hearing taste or smell weakness one-sided E other.  Remainder review of systems is negative.      PHYSICAL EXAM: Patient is alert pleasant does not appear to be in acute stress head is normocephalic and atraumatic sclera conjunctiva is clear oromucosa is moist nose at discharge.  Heart sounds were irregular regular with adequate rate control.  Abdomen nominal exam showed that she did have some suprapubic tenderness at this time.  But no rebound or guarding.  Bowel sounds are positive in all 4 quadrants.        LABS: Urine culture done from Saturday grew out 100,000 Klebsiella pneumoniae.  Was sensitive to a lot of antibiotics which is a lot of allergies but is sensitive to Cipro which she can take.  Over the weekend they did call the on-call and the on-call started her on nitrofurantoin which is intermediate.      ASSESSMENT:   Encounter Diagnoses   Name Primary?     Urinary retention Yes     Acute cystitis without hematuria      History of aspiration pneumonia      Stage 3 chronic kidney disease, unspecified whether stage 3a or 3b CKD (H)      Paroxysmal atrial fibrillation (H)      Acute and chronic respiratory failure with hypoxia (H)      Acute on chronic diastolic congestive heart failure (H)         PLAN: Treatment at this time will start ciprofloxacin 250 mg twice daily x7 days.  We will continue to monitor above medical problems.  Monitor bowel movements and monitor for any signs of adverse reaction.  I did discuss with her detail about adverse reaction the only adverse reaction to lisinopril she feels she might had upset stomach.  There is no signs of rash and I did discuss with her and her daughter and there was no history of anaphylaxis with any of these medications.    I will continue to monitor above medical problems and no other changes to care plan at this time.        Electronically  signed by: TYRELL CARRIZALES DO

## 2021-12-29 NOTE — LETTER
12/29/2021        RE: Maranda Downing  [address]  [place] [address]        [facility]    Facility:  [facility] ([facility]) [[id number]]  Code Status: DNR      CHIEF COMPLAINT/REASON FOR VISIT:  Chief Complaint   Patient presents with     RECHECK       HISTORY:      HPI: Maranda is a 86 year old female who I am seen today for urinary tract infection.  She does have a chronic Driver catheter in at this time please see my note from yesterday for further details.  She has pneumonia and she also is aspiration she continues with 3 L of oxygen a try was done yesterday 2.5 L she did make it.  She has had some symptoms of urinary tract infection with some urgency does have a Driver catheter in but seems to be moving urine well urine is darker yellow without any signs of blood.  She is comfortable yesterday however she is symptomatic.    She should get short of breath with exertion but otherwise no other issues.    History reviewed. No pertinent past medical history.          History reviewed. No pertinent family history.  Social History     Socioeconomic History     Marital status:      Spouse name: Not on file     Number of children: Not on file     Years of education: Not on file     Highest education level: Not on file   Occupational History     Not on file   Tobacco Use     Smoking status: Unknown If Ever Smoked     Smokeless tobacco: Never Used   Substance and Sexual Activity     Alcohol use: Not on file     Drug use: Not on file     Sexual activity: Not on file   Other Topics Concern     Not on file   Social History Narrative     Not on file     Social Determinants of Health     Financial Resource Strain: Not on file   Food Insecurity: Not on file   Transportation Needs: Not on file   Physical Activity: Not on file   Stress: Not on file   Social Connections: Not on file   Intimate Partner Violence: Not on file   Housing Stability: Not on file         REVIEW OF SYSTEM: Positive  suprapubic tenderness with urgency with urination.  He does a Driver catheter in.  She is shortness of breath with exertion but none at rest and denies any fevers chills nausea vomit diarrhea change in vision hearing taste or smell weakness one-sided E other.  Remainder review of systems is negative.      PHYSICAL EXAM: Patient is alert pleasant does not appear to be in acute stress head is normocephalic and atraumatic sclera conjunctiva is clear oromucosa is moist nose at discharge.  Heart sounds were irregular regular with adequate rate control.  Abdomen nominal exam showed that she did have some suprapubic tenderness at this time.  But no rebound or guarding.  Bowel sounds are positive in all 4 quadrants.        LABS: Urine culture done from Saturday grew out 100,000 Klebsiella pneumoniae.  Was sensitive to a lot of antibiotics which is a lot of allergies but is sensitive to Cipro which she can take.  Over the weekend they did call the on-call and the on-call started her on nitrofurantoin which is intermediate.      ASSESSMENT:   Encounter Diagnoses   Name Primary?     Urinary retention Yes     Acute cystitis without hematuria      History of aspiration pneumonia      Stage 3 chronic kidney disease, unspecified whether stage 3a or 3b CKD (H)      Paroxysmal atrial fibrillation (H)      Acute and chronic respiratory failure with hypoxia (H)      Acute on chronic diastolic congestive heart failure (H)         PLAN: Treatment at this time will start ciprofloxacin 250 mg twice daily x7 days.  We will continue to monitor above medical problems.  Monitor bowel movements and monitor for any signs of adverse reaction.  I did discuss with her detail about adverse reaction the only adverse reaction to lisinopril she feels she might had upset stomach.  There is no signs of rash and I did discuss with her and her daughter and there was no history of anaphylaxis with any of these medications.    I will continue to monitor  above medical problems and no other changes to care plan at this time.        Electronically signed by: TYRELL CARRIZALES DO        Sincerely,        TYRELL CARRIZALES DO

## 2021-12-30 ENCOUNTER — LAB REQUISITION (OUTPATIENT)
Dept: LAB | Facility: CLINIC | Age: 86
End: 2021-12-30
Payer: COMMERCIAL

## 2021-12-30 ENCOUNTER — TRANSITIONAL CARE UNIT VISIT (OUTPATIENT)
Dept: GERIATRICS | Facility: CLINIC | Age: 86
End: 2021-12-30
Payer: MEDICARE

## 2021-12-30 VITALS
DIASTOLIC BLOOD PRESSURE: 60 MMHG | RESPIRATION RATE: 18 BRPM | TEMPERATURE: 97.6 F | OXYGEN SATURATION: 94 % | WEIGHT: 162 LBS | SYSTOLIC BLOOD PRESSURE: 111 MMHG | HEIGHT: 62 IN | BODY MASS INDEX: 29.81 KG/M2 | HEART RATE: 74 BPM

## 2021-12-30 DIAGNOSIS — I10 ESSENTIAL (PRIMARY) HYPERTENSION: ICD-10-CM

## 2021-12-30 DIAGNOSIS — R33.9 URINARY RETENTION: Primary | ICD-10-CM

## 2021-12-30 DIAGNOSIS — Z87.01 HISTORY OF ASPIRATION PNEUMONIA: ICD-10-CM

## 2021-12-30 DIAGNOSIS — N18.30 STAGE 3 CHRONIC KIDNEY DISEASE, UNSPECIFIED WHETHER STAGE 3A OR 3B CKD (H): ICD-10-CM

## 2021-12-30 DIAGNOSIS — N30.00 ACUTE CYSTITIS WITHOUT HEMATURIA: ICD-10-CM

## 2021-12-30 PROCEDURE — 99309 SBSQ NF CARE MODERATE MDM 30: CPT | Performed by: FAMILY MEDICINE

## 2021-12-30 ASSESSMENT — MIFFLIN-ST. JEOR: SCORE: 1128.08

## 2021-12-30 NOTE — PROGRESS NOTES
[facility] GERIATRIC SERVICES    Facility:  [facility] [place] ([facility]) [[id number]]  Code Status: DNR      CHIEF COMPLAINT/REASON FOR VISIT:  Chief Complaint   Patient presents with     RECHECK       HISTORY:      HPI: Maranda is a 86 year old female who I did see a couple of days ago for urinary tract infection did start Cipro.  I am here to check up on her side effects of his medications she still has aspiration morning she still continues on 3 L of oxygen.  We will try to get a 2.5 but she became hypoxic.  She still continue to use the spirometry at this time.  And overall staff have no new concerns.    Patient is having increased cough and congestion today but lungs are sounding pretty clear.  She continues on 3 L of oxygen at this time and she is on Cipro for antibiotic coverage at this time.  She has no other concerns.    No past medical history on file.          No family history on file.  Social History     Socioeconomic History     Marital status:      Spouse name: Not on file     Number of children: Not on file     Years of education: Not on file     Highest education level: Not on file   Occupational History     Not on file   Tobacco Use     Smoking status: Unknown If Ever Smoked     Smokeless tobacco: Never Used   Substance and Sexual Activity     Alcohol use: Not on file     Drug use: Not on file     Sexual activity: Not on file   Other Topics Concern     Not on file   Social History Narrative     Not on file     Social Determinants of Health     Financial Resource Strain: Not on file   Food Insecurity: Not on file   Transportation Needs: Not on file   Physical Activity: Not on file   Stress: Not on file   Social Connections: Not on file   Intimate Partner Violence: Not on file   Housing Stability: Not on file         REVIEW OF SYSTEM: Cough and congestion this morning but no fevers chills.  She did have some vomiting but basically from cough and no nausea associated with this.  She is moving her  bowels well and urinating without difficulty and the main review of systems is negative.      PHYSICAL EXAM: Patient is alert pleasant does not appear to be acute distress head is normocephalic and atraumatic sclera conjunctive is clear oromucosa is moist nasal discharge.  Heart sounds are regular lungs were clear to auscultation no signs of any crackles rales or wheezes.  Abdomen soft nontender.  No signs of suprapubic tenderness.  Neurologic exam is baseline and affect is pleasant.        LABS: Urinalysis and urine culture did grow Klebsiella and she did start Cipro yesterday.    Vitals; blood pressure 92/64    Pulse is 94    Temperature is 97.6    Respirations 18    O2 sats 99%.      ASSESSMENT:   Encounter Diagnoses   Name Primary?     Urinary retention Yes     Acute cystitis without hematuria      History of aspiration pneumonia      Stage 3 chronic kidney disease, unspecified whether stage 3a or 3b CKD (H)         PLAN: Plan at this time we will continue antibiotics at this time and to monitor respiratory status.  Given the fact she is coughing congested we will keep her 3 L at this time however I do want to start to titrate down to 2.5 L.    I will discussed with the nurse to try to give her a trial today and no other changes to care plan at this time.  Monday will get a basic metabolic profile.         Electronically signed by: TYRELL CARRIZALES DO

## 2021-12-31 LAB
ANION GAP SERPL CALCULATED.3IONS-SCNC: 9 MMOL/L (ref 5–18)
BNP SERPL-MCNC: 69 PG/ML (ref 0–167)
BUN SERPL-MCNC: 31 MG/DL (ref 8–28)
CALCIUM SERPL-MCNC: 9.5 MG/DL (ref 8.5–10.5)
CHLORIDE BLD-SCNC: 98 MMOL/L (ref 98–107)
CO2 SERPL-SCNC: 26 MMOL/L (ref 22–31)
CREAT SERPL-MCNC: 1.08 MG/DL (ref 0.6–1.1)
GFR SERPL CREATININE-BSD FRML MDRD: 50 ML/MIN/1.73M2
GLUCOSE BLD-MCNC: 94 MG/DL (ref 70–125)
POTASSIUM BLD-SCNC: 4.5 MMOL/L (ref 3.5–5)
SODIUM SERPL-SCNC: 133 MMOL/L (ref 136–145)

## 2021-12-31 PROCEDURE — 83880 ASSAY OF NATRIURETIC PEPTIDE: CPT | Performed by: FAMILY MEDICINE

## 2021-12-31 PROCEDURE — P9604 ONE-WAY ALLOW PRORATED TRIP: HCPCS | Performed by: FAMILY MEDICINE

## 2021-12-31 PROCEDURE — 36415 COLL VENOUS BLD VENIPUNCTURE: CPT | Performed by: FAMILY MEDICINE

## 2021-12-31 PROCEDURE — 80048 BASIC METABOLIC PNL TOTAL CA: CPT | Performed by: FAMILY MEDICINE

## 2022-01-03 ENCOUNTER — TRANSITIONAL CARE UNIT VISIT (OUTPATIENT)
Dept: GERIATRICS | Facility: CLINIC | Age: 87
End: 2022-01-03
Payer: MEDICARE

## 2022-01-03 VITALS
OXYGEN SATURATION: 93 % | WEIGHT: 160.6 LBS | SYSTOLIC BLOOD PRESSURE: 101 MMHG | DIASTOLIC BLOOD PRESSURE: 66 MMHG | RESPIRATION RATE: 20 BRPM | HEART RATE: 78 BPM | BODY MASS INDEX: 29.37 KG/M2 | TEMPERATURE: 97.5 F

## 2022-01-03 DIAGNOSIS — I50.33 ACUTE ON CHRONIC DIASTOLIC CONGESTIVE HEART FAILURE (H): Primary | ICD-10-CM

## 2022-01-03 DIAGNOSIS — I48.0 PAROXYSMAL ATRIAL FIBRILLATION (H): ICD-10-CM

## 2022-01-03 DIAGNOSIS — J96.21 ACUTE AND CHRONIC RESPIRATORY FAILURE WITH HYPOXIA (H): ICD-10-CM

## 2022-01-03 DIAGNOSIS — R33.9 URINARY RETENTION: ICD-10-CM

## 2022-01-03 DIAGNOSIS — N18.30 STAGE 3 CHRONIC KIDNEY DISEASE, UNSPECIFIED WHETHER STAGE 3A OR 3B CKD (H): ICD-10-CM

## 2022-01-03 DIAGNOSIS — E11.22 TYPE 2 DIABETES MELLITUS WITH DIABETIC CHRONIC KIDNEY DISEASE, UNSPECIFIED CKD STAGE, UNSPECIFIED WHETHER LONG TERM INSULIN USE (H): ICD-10-CM

## 2022-01-03 DIAGNOSIS — J15.69 PNEUMONIA OF LEFT LOWER LOBE DUE TO OTHER AEROBIC GRAM-NEGATIVE BACTERIA (H): ICD-10-CM

## 2022-01-03 PROBLEM — N30.00 ACUTE CYSTITIS WITHOUT HEMATURIA: Status: ACTIVE | Noted: 2022-01-03

## 2022-01-03 PROBLEM — Z91.199 FAILURE TO ATTEND APPOINTMENT: Status: ACTIVE | Noted: 2022-01-03

## 2022-01-03 PROBLEM — Z87.01 HISTORY OF ASPIRATION PNEUMONIA: Status: ACTIVE | Noted: 2022-01-03

## 2022-01-03 PROCEDURE — 99309 SBSQ NF CARE MODERATE MDM 30: CPT | Performed by: NURSE PRACTITIONER

## 2022-01-03 RX ORDER — CIPROFLOXACIN 250 MG/1
250 TABLET, FILM COATED ORAL 2 TIMES DAILY
COMMUNITY
Start: 2021-12-29 | End: 2022-01-03

## 2022-01-04 NOTE — PROGRESS NOTES
[facility] GERIATRIC SERVICES    Code Status:  FULL CODE   Visit Type:   Chief Complaint   Patient presents with     RECHECK     Facility:  [facility] ([facility]) [[id number]]           History of Present Illness: Maranda Downing is a 86 year old female who I am seeing today for follow-up on the TCU.  Patient initially hospitalized at [facility] from 12/7 to 12/10/2021 after a mechanical fall.  Patient found to have atrial fib with rapid ventricular response as well as right bundle branch block and T wave abnormality.  She also had left lower lobe pneumonia.  She was treated with 7 days of antibiotics and transitioned onto oral Omnicef and doxycycline.  Patient discharged to the TCU on 4 L of oxygen.  2 days after admit to the TCU she was rehospitalized with acute and worsening progressive hypoxia.  She also had a brief episode of chest pain.  She had completed her antibiotics.  She was 75% on room air.  Covid 19 test was negative.  Patient found to have CHF with an elevated BNP of 900 with mild elevated troponin.  She had extensive bilateral pulmonary infiltrates.  She was treated with IV Lasix.  She continued on oral Coreg.  She was transitioned to oral diuretics.  Hypercholesterolemia on statin.  Mild cognitive impairment.  Hypertension treated with losartan and Coreg.  Type 2 diabetes with long-term use of insulin.  She was also treated with sliding scale.  Pulmonary hampered tension.  History noted.  Stage III chronic kidney disease.  Creatinine remained stable following diuresis.  Proximal atrial tachycardia.  Patient continued on losartan, beta-blocker and apixaban for chronic anticoagulation.  Elevated troponin most likely from demand ischemia.    On today's visit patient sitting up in wheelchair.  She continues on oxygen at 2 L.  Cough is now dry.  She denies any chest pain.  She reports her shortness of breath is improving.  She does report low endurance.  Will attempt to wean off oxygen.  She  continues on Cipro with last dose being today and doxycycline with last dose being on 1/4/2022.  Patient was previously having some diarrhea.  C. difficile was negative.  She is on probiotic.  Nursing staff also reporting patient burping up and complaining of heartburn.  Patient continues with Driver catheter for urinary retention during hospitalization.  Will attempt to wean off.  Sodium 133.  Recent CBC unremarkable.  Patient continues on daily weights.  Weights have been stable.    Active Ambulatory Problems     Diagnosis Date Noted     Controlled type 2 diabetes mellitus with complication, without long-term current use of insulin (H) 02/11/2013     Chronic diarrhea 12/07/2021     Anxiety state 01/06/2011     Allergic rhinitis 05/22/2009     AK (actinic keratosis) 01/24/2013     Acquired absence of other left toe(s) (H) 02/12/2020     Abdominal pain 06/16/2014     Coronary artery disease of native artery of native heart with stable angina pectoris (H) 05/20/2009     Cystitis cystica 05/22/2009     Constipation 04/20/2021     Dysesthesia 05/22/2009     Fall 12/07/2021     Fatty liver 01/16/2012     Fibromyalgia 08/02/2012     Gastroesophageal reflux disease without esophagitis 07/28/2011     Genital herpes 03/13/2015     Hammertoe of left foot 02/06/2019     Hypercalcemia 01/24/2013     Hypercholesteremia 12/13/2021     Hypertension 12/13/2021     Hypokalemia 04/20/2021     Hypoxia 12/07/2021     Osteoporosis 05/22/2009     OA (osteoarthritis) of knee 03/14/2013     Nausea & vomiting 06/16/2014     Mild cognitive impairment 06/29/2009     Midline thoracic back pain 06/23/2015     Irritable bowel syndrome 05/22/2009     Overflow diarrhea 03/12/2014     Vitamin B 12 deficiency 05/22/2009     Stage 3 chronic kidney disease, unspecified whether stage 3a or 3b CKD (H) 01/29/2021     Sensorineural hearing loss, bilateral 01/17/2019     Rosacea 05/22/2009     Right bundle branch block 03/20/2014     Pulmonary hypertension  (H) 01/29/2021     PPD positive 06/29/2009     Paroxysmal atrial tachycardia (H) 12/07/2021     Noninfectious gastroenteritis 04/25/2021     Elevated troponin 12/14/2021     Diverticular disease 12/27/2021     Acute on chronic diastolic congestive heart failure (H) 12/14/2021     Urinary retention 01/03/2022     Acute cystitis without hematuria 01/03/2022     History of aspiration pneumonia 01/03/2022     Paroxysmal atrial fibrillation (H) 01/03/2022     Acute and chronic respiratory failure with hypoxia (H) 01/03/2022     Failure to attend appointment 01/03/2022     Resolved Ambulatory Problems     Diagnosis Date Noted     No Resolved Ambulatory Problems     No Additional Past Medical History       Current Outpatient Medications:      ciprofloxacin (CIPRO) 250 MG tablet, Take 250 mg by mouth 2 times daily, Disp: , Rfl:      acetaminophen (TYLENOL) 325 MG tablet, Take 325 mg by mouth every 4 hours as needed for mild pain, Disp: , Rfl:      albuterol (PROVENTIL) (2.5 MG/3ML) 0.083% neb solution, Take 2.5 mg by nebulization 4 times daily as needed for shortness of breath / dyspnea or wheezing Also give scheduled 3 times daily, Disp: , Rfl:      calcitonin, salmon, (MIACALCIN) 200 UNIT/ACT nasal spray, Spray 1 spray into one nostril alternating nostrils daily Alternate nostril each day., Disp: , Rfl:      carvedilol (COREG) 12.5 MG tablet, Take 12.5 mg by mouth 2 times daily (with meals), Disp: , Rfl:      doxycycline monohydrate (MONODOX) 100 MG capsule, Take 100 mg by mouth 2 times daily, Disp: , Rfl:      fluticasone (FLONASE) 50 MCG/ACT nasal spray, 2 sprays daily, Disp: , Rfl:      furosemide (LASIX) 20 MG tablet, Take 20 mg by mouth daily, Disp: , Rfl:      lactobacillus rhamnosus, GG, (CULTURELL) capsule, Take 1 capsule by mouth 2 times daily, Disp: , Rfl:      loperamide (IMODIUM) 2 MG capsule, Take 2 mg by mouth 3 times daily as needed for diarrhea, Disp: , Rfl:      LORazepam (ATIVAN) 1 MG tablet, Take 1 mg  by mouth 2 times daily as needed for anxiety, Disp: , Rfl:      losartan (COZAAR) 25 MG tablet, Take 25 mg by mouth 2 times daily, Disp: , Rfl:      potassium chloride ER (K-TAB) 20 MEQ CR tablet, Take 20 mEq by mouth 2 times daily, Disp: , Rfl:      sertraline (ZOLOFT) 50 MG tablet, Take 50 mg by mouth daily, Disp: , Rfl:      simvastatin (ZOCOR) 20 MG tablet, Take 20 mg by mouth At Bedtime, Disp: , Rfl:   Allergies   Allergen Reactions     Codeine Nausea     Hydrocodone      Other reaction(s): GI Upset  nausea     Levofloxacin      Other reaction(s): Intolerance-Can't Take  Made leg pain worse.     Lisinopril Cough     Morphine Nausea and Vomiting     Other reaction(s): Nausea/Vomiting     Sulfa Drugs Nausea     Penicillins Rash       All Meds and Allergies reviewed in the record at the facility and is the most up-to-date    REVIEW OF SYSTEMS:   Review of Systems  No fevers or chills. No headache, lightheadedness or dizziness.  SOB improving, she continues on oxygen at 2 L, no chest pains or palpitations. Appetite is good. No nausea, vomiting, constipation or diarrhea.  Patient reports of burping up and symptoms consistent with GERD.  Urinary retention with Driver catheter.  Otherwise review of systems are negative.     PHYSICAL EXAMINATION:  Physical Exam     Vital signs: /66   Pulse 78   Temp 97.5  F (36.4  C)   Resp 20   Wt 72.8 kg (160 lb 9.6 oz)   SpO2 93%   BMI 29.37 kg/m    General: Awake, Alert, oriented x1, appropriately, follows simple commands, conversant  HEENT:Pink conjunctiva, anicteric sclerae, moist oral mucosa  NECK: Supple, without any lymphadenopathy, or masses  CVS:  S1  S2, without murmur or gallop.   LUNG: Crackles in the lower lobe.  Continues on oxygen 2 L.  Cough dry.  BACK: No kyphosis of the thoracic spine  ABDOMEN: Soft, nontender to palpation, with positive bowel sounds  : Driver catheter draining clear yellow urine.  EXTREMITIES: Moves both upper and lower extremities with  generalized weakness, trace pedal edema, no calf tenderness  SKIN: Warm and dry, no rashes or erythema noted  NEUROLOGIC: Intact, pulses palpable  PSYCHIATRIC: Cognitive impairment noted.      Labs:  All labs reviewed in the nursing home record and Epic   @  Lab Results   Component Value Date    WBC 11.7 12/14/2021     Lab Results   Component Value Date    RBC 3.93 12/14/2021     Lab Results   Component Value Date    HGB 10.7 12/14/2021     Lab Results   Component Value Date    HCT 33.2 12/14/2021     Lab Results   Component Value Date    MCV 85 12/14/2021     Lab Results   Component Value Date    MCH 27.2 12/14/2021     Lab Results   Component Value Date    MCHC 32.2 12/14/2021     Lab Results   Component Value Date    RDW 13.6 12/14/2021     Lab Results   Component Value Date     12/14/2021        @Last Comprehensive Metabolic Panel:  Sodium   Date Value Ref Range Status   12/31/2021 133 (L) 136 - 145 mmol/L Final     Potassium   Date Value Ref Range Status   12/31/2021 4.5 3.5 - 5.0 mmol/L Final     Chloride   Date Value Ref Range Status   12/31/2021 98 98 - 107 mmol/L Final     Carbon Dioxide (CO2)   Date Value Ref Range Status   12/31/2021 26 22 - 31 mmol/L Final     Anion Gap   Date Value Ref Range Status   12/31/2021 9 5 - 18 mmol/L Final     Glucose   Date Value Ref Range Status   12/31/2021 94 70 - 125 mg/dL Final     Urea Nitrogen   Date Value Ref Range Status   12/31/2021 31 (H) 8 - 28 mg/dL Final     Creatinine   Date Value Ref Range Status   12/31/2021 1.08 0.60 - 1.10 mg/dL Final     GFR Estimate   Date Value Ref Range Status   12/31/2021 50 (L) >60 mL/min/1.73m2 Final     Comment:     Effective December 21, 2021 eGFRcr in adults is calculated using the 2021 CKD-EPI creatinine equation which includes age and gender (Soumya boggs al., NEJM, DOI: 10.1056/NQJIro9535241)     Calcium   Date Value Ref Range Status   12/31/2021 9.5 8.5 - 10.5 mg/dL Final         Assessment/Plan:    ICD-10-CM    1. Acute on  chronic diastolic congestive heart failure (H)  I50.33  patient continues on Lasix 20 mg daily.  Continue to monitor daily weights.  Weight stable.   2. Pneumonia of left lower lobe due to other aerobic gram-negative bacteria (H)  J15.6  last dose of Cipro is today.  Patient continues on doxycycline till 1/4/2022.  Currently afebrile.   3. Paroxysmal atrial fibrillation (H)  I48.0  continue metoprolol and apixaban.   4. Acute and chronic respiratory failure with hypoxia (H)  J96.21  continues on oxygen at 2 L.  Attempt to wean off.   5. Urinary retention  R33.9  continues with Driver catheter.  Will attempt to discontinue.   6. Stage 3 chronic kidney disease, unspecified whether stage 3a or 3b CKD (H)  N18.30  creatinine stable.   7. Type 2 diabetes mellitus with diabetic chronic kidney disease, unspecified CKD stage, unspecified whether long term insulin use (H)  E11.22  satisfactory controlled.   8.   GERD   start omeprazole 20 mg p.o. twice daily.         This note has been dictated using voice recognition software. Any grammatical or context distortions are unintentional and inherent to the software    Electronically signed by: Teresa Villa, CNP

## 2022-01-06 ENCOUNTER — TRANSITIONAL CARE UNIT VISIT (OUTPATIENT)
Dept: GERIATRICS | Facility: CLINIC | Age: 87
End: 2022-01-06
Payer: MEDICARE

## 2022-01-06 VITALS
BODY MASS INDEX: 29.04 KG/M2 | RESPIRATION RATE: 16 BRPM | HEART RATE: 79 BPM | HEIGHT: 62 IN | TEMPERATURE: 96.2 F | SYSTOLIC BLOOD PRESSURE: 119 MMHG | OXYGEN SATURATION: 92 % | DIASTOLIC BLOOD PRESSURE: 74 MMHG | WEIGHT: 157.8 LBS

## 2022-01-06 DIAGNOSIS — R19.7 DIARRHEA, UNSPECIFIED TYPE: ICD-10-CM

## 2022-01-06 DIAGNOSIS — N18.30 STAGE 3 CHRONIC KIDNEY DISEASE, UNSPECIFIED WHETHER STAGE 3A OR 3B CKD (H): ICD-10-CM

## 2022-01-06 DIAGNOSIS — J15.69 PNEUMONIA OF LEFT LOWER LOBE DUE TO OTHER AEROBIC GRAM-NEGATIVE BACTERIA (H): ICD-10-CM

## 2022-01-06 DIAGNOSIS — K21.00 GASTROESOPHAGEAL REFLUX DISEASE WITH ESOPHAGITIS WITHOUT HEMORRHAGE: ICD-10-CM

## 2022-01-06 DIAGNOSIS — I48.0 PAROXYSMAL ATRIAL FIBRILLATION (H): ICD-10-CM

## 2022-01-06 DIAGNOSIS — N30.00 ACUTE CYSTITIS WITHOUT HEMATURIA: ICD-10-CM

## 2022-01-06 DIAGNOSIS — R33.9 URINARY RETENTION: ICD-10-CM

## 2022-01-06 DIAGNOSIS — J96.21 ACUTE AND CHRONIC RESPIRATORY FAILURE WITH HYPOXIA (H): ICD-10-CM

## 2022-01-06 DIAGNOSIS — R11.2 NAUSEA AND VOMITING, INTRACTABILITY OF VOMITING NOT SPECIFIED, UNSPECIFIED VOMITING TYPE: ICD-10-CM

## 2022-01-06 DIAGNOSIS — E11.22 TYPE 2 DIABETES MELLITUS WITH DIABETIC CHRONIC KIDNEY DISEASE, UNSPECIFIED CKD STAGE, UNSPECIFIED WHETHER LONG TERM INSULIN USE (H): ICD-10-CM

## 2022-01-06 DIAGNOSIS — R13.10 DYSPHAGIA, UNSPECIFIED TYPE: ICD-10-CM

## 2022-01-06 DIAGNOSIS — I50.33 ACUTE ON CHRONIC DIASTOLIC CONGESTIVE HEART FAILURE (H): Primary | ICD-10-CM

## 2022-01-06 PROCEDURE — 99310 SBSQ NF CARE HIGH MDM 45: CPT | Performed by: NURSE PRACTITIONER

## 2022-01-06 ASSESSMENT — MIFFLIN-ST. JEOR: SCORE: 1109.03

## 2022-01-07 RX ORDER — OMEPRAZOLE 20 MG/1
20 TABLET, DELAYED RELEASE ORAL 2 TIMES DAILY
COMMUNITY

## 2022-01-07 RX ORDER — ONDANSETRON 4 MG/1
4 TABLET, FILM COATED ORAL 2 TIMES DAILY PRN
COMMUNITY

## 2022-01-07 NOTE — PROGRESS NOTES
[facility] GERIATRIC SERVICES    Code Status:  FULL CODE   Visit Type:   Chief Complaint   Patient presents with     RECHECK     Facility:  [facility] ([facility]) [[id number]]           History of Present Illness: Maranda Downing is a 86 year old female who I am seeing today for follow-up on the TCU.  Patient initially hospitalized at [facility] from 12/7 to 12/10/2021 after a mechanical fall.  Patient found to have atrial fib with rapid ventricular response as well as right bundle branch block and T wave abnormality.  She also had left lower lobe pneumonia.  She was treated with 7 days of antibiotics and transitioned onto oral Omnicef and doxycycline.  Patient discharged to the TCU on 4 L of oxygen.  2 days after admit to the TCU she was rehospitalized with acute and worsening progressive hypoxia.  She also had a brief episode of chest pain.  She had completed her antibiotics.  She was 75% on room air.  Covid 19 test was negative.  Patient found to have CHF with an elevated BNP of 900 with mild elevated troponin.  She had extensive bilateral pulmonary infiltrates.  She was treated with IV Lasix.  She continued on oral Coreg.  She was transitioned to oral diuretics.  Hypercholesterolemia on statin.  Mild cognitive impairment.  Hypertension treated with losartan and Coreg.  Type 2 diabetes with long-term use of insulin.  She was also treated with sliding scale.  Pulmonary hampered tension.  History noted.  Stage III chronic kidney disease.  Creatinine remained stable following diuresis.  Proximal atrial tachycardia.  Patient continued on losartan, beta-blocker and apixaban for chronic anticoagulation.  Elevated troponin most likely from demand ischemia.    On today's visit patient sitting up in bedside chair.  Patient with recent CHF exacerbation and history of aspiration pneumonia.  She has completed her oral antibiotics.  She continues on Lasix.  Shortness of breath improving.  We are attempting to wean her  off her oxygen.  She continues at 2 L.  Patient continues with some coughing with talking and taking meds.  She was recently started on omeprazole for GERD.  Upon today's visit she was attempting to swallow her medication.  Her potassium pill had been cut into fours.  The TMA was present.  She had a choking episode and was unable to clear.  She was given the Heimlich and eventually coughed up the remaining tablet.  I discussed with her switching this to liquid form.  Cough is dry.  She continues on speech therapy.  There is discussion of performing a fees test at the facility.  She does not want to go to the hospital for a video swallow.  Patient with recent UTI.  She completed her oral Cipro.  She does continue with Driver catheter in place.  We discussed a voiding trial however wait until she is a little more mobile.  Chronic loose stools.  She has been checked for C. difficile which was negative.  She is on a probiotic.  We will attempt to bulk with Metamucil.  She has also had some nausea and vomiting with the coughing am not sure if that is related to her GERD, force of coughing and/or upset stomach from recent antibiotic use.  CKD.  Type 2 diabetes.  Blood sugar satisfactory.  Patient does have some underlying anxiety.  She continues on as needed lorazepam.        Active Ambulatory Problems     Diagnosis Date Noted     Type 2 diabetes mellitus with diabetic chronic kidney disease, unspecified CKD stage, unspecified whether long term insulin use (H) 02/11/2013     Chronic diarrhea 12/07/2021     Anxiety state 01/06/2011     Allergic rhinitis 05/22/2009     AK (actinic keratosis) 01/24/2013     Acquired absence of other left toe(s) (H) 02/12/2020     Abdominal pain 06/16/2014     Coronary artery disease of native artery of native heart with stable angina pectoris (H) 05/20/2009     Cystitis cystica 05/22/2009     Constipation 04/20/2021     Dysesthesia 05/22/2009     Fall 12/07/2021     Fatty liver 01/16/2012      Fibromyalgia 08/02/2012     Gastroesophageal reflux disease without esophagitis 07/28/2011     Genital herpes 03/13/2015     Hammertoe of left foot 02/06/2019     Hypercalcemia 01/24/2013     Hypercholesteremia 12/13/2021     Hypertension 12/13/2021     Hypokalemia 04/20/2021     Hypoxia 12/07/2021     Osteoporosis 05/22/2009     OA (osteoarthritis) of knee 03/14/2013     Nausea & vomiting 06/16/2014     Mild cognitive impairment 06/29/2009     Midline thoracic back pain 06/23/2015     Irritable bowel syndrome 05/22/2009     Overflow diarrhea 03/12/2014     Vitamin B 12 deficiency 05/22/2009     Stage 3 chronic kidney disease, unspecified whether stage 3a or 3b CKD (H) 01/29/2021     Sensorineural hearing loss, bilateral 01/17/2019     Rosacea 05/22/2009     Right bundle branch block 03/20/2014     Pulmonary hypertension (H) 01/29/2021     PPD positive 06/29/2009     Paroxysmal atrial tachycardia (H) 12/07/2021     Noninfectious gastroenteritis 04/25/2021     Elevated troponin 12/14/2021     Diverticular disease 12/27/2021     Acute on chronic diastolic congestive heart failure (H) 12/14/2021     Urinary retention 01/03/2022     Acute cystitis without hematuria 01/03/2022     History of aspiration pneumonia 01/03/2022     Paroxysmal atrial fibrillation (H) 01/03/2022     Acute and chronic respiratory failure with hypoxia (H) 01/03/2022     Failure to attend appointment 01/03/2022     Pneumonia of left lower lobe due to other aerobic gram-negative bacteria (H) 01/06/2022     Resolved Ambulatory Problems     Diagnosis Date Noted     No Resolved Ambulatory Problems     No Additional Past Medical History       Current Outpatient Medications:      omeprazole (PRILOSEC OTC) 20 MG EC tablet, Take 20 mg by mouth 2 times daily , Disp: , Rfl:      ondansetron (ZOFRAN) 4 MG tablet, Take by mouth 2 times daily Give 30 minutes prior to meals and meds., Disp: , Rfl:      acetaminophen (TYLENOL) 325 MG tablet, Take 325 mg by mouth  every 4 hours as needed for mild pain, Disp: , Rfl:      albuterol (PROVENTIL) (2.5 MG/3ML) 0.083% neb solution, Take 2.5 mg by nebulization 4 times daily as needed for shortness of breath / dyspnea or wheezing Also give scheduled 3 times daily, Disp: , Rfl:      calcitonin, salmon, (MIACALCIN) 200 UNIT/ACT nasal spray, Spray 1 spray into one nostril alternating nostrils daily Alternate nostril each day., Disp: , Rfl:      carvedilol (COREG) 12.5 MG tablet, Take 12.5 mg by mouth 2 times daily (with meals), Disp: , Rfl:      fluticasone (FLONASE) 50 MCG/ACT nasal spray, 2 sprays daily, Disp: , Rfl:      furosemide (LASIX) 20 MG tablet, Take 20 mg by mouth daily, Disp: , Rfl:      lactobacillus rhamnosus, GG, (CULTURELL) capsule, Take 1 capsule by mouth 2 times daily, Disp: , Rfl:      loperamide (IMODIUM) 2 MG capsule, Take 2 mg by mouth 3 times daily as needed for diarrhea, Disp: , Rfl:      LORazepam (ATIVAN) 1 MG tablet, Take 1 mg by mouth 2 times daily as needed for anxiety, Disp: , Rfl:      losartan (COZAAR) 25 MG tablet, Take 25 mg by mouth 2 times daily, Disp: , Rfl:      potassium chloride ER (K-TAB) 20 MEQ CR tablet, Take 20 mEq by mouth 2 times daily, Disp: , Rfl:      sertraline (ZOLOFT) 50 MG tablet, Take 50 mg by mouth daily, Disp: , Rfl:      simvastatin (ZOCOR) 20 MG tablet, Take 20 mg by mouth At Bedtime, Disp: , Rfl:   Allergies   Allergen Reactions     Codeine Nausea     Hydrocodone      Other reaction(s): GI Upset  nausea     Levofloxacin      Other reaction(s): Intolerance-Can't Take  Made leg pain worse.     Lisinopril Cough     Morphine Nausea and Vomiting     Other reaction(s): Nausea/Vomiting     Sulfa Drugs Nausea     Penicillins Rash       All Meds and Allergies reviewed in the record at the facility and is the most up-to-date    REVIEW OF SYSTEMS:   Review of Systems  No fevers or chills. No headache, lightheadedness or dizziness.  SOB improving, she continues on oxygen at 2 L, no chest  "pains or palpitations. Appetite is fair.  Recent nausea and vomiting associated with coughing.Chronic diarrhea.  3-4 loose stools a day.  Patient reports of burping up and symptoms consistent with GERD.  Patient recently started on omeprazole.  + Dysphagia. Urinary retention with Driver catheter.  Otherwise review of systems are negative.     PHYSICAL EXAMINATION:  Physical Exam     Vital signs: /74   Pulse 79   Temp (!) 96.2  F (35.7  C)   Resp 16   Ht 1.575 m (5' 2\")   Wt 71.6 kg (157 lb 12.8 oz)   SpO2 92%   BMI 28.86 kg/m    General: Awake, Alert, oriented x1, appropriately, follows simple commands, conversant  HEENT:Pink conjunctiva, anicteric sclerae, moist oral mucosa  NECK: Supple, without any lymphadenopathy, or masses  CVS:  S1  S2, without murmur or gallop.   LUNG: No wheezes rales or rhonchi.  Continues on oxygen 2 L.  Cough dry.  BACK: No kyphosis of the thoracic spine  ABDOMEN: Soft, nontender to palpation, with positive bowel sounds  : Driver catheter draining clear yellow urine.  EXTREMITIES: Moves both upper and lower extremities with generalized weakness, trace pedal edema, no calf tenderness  SKIN: Warm and dry, no rashes or erythema noted  NEUROLOGIC: Intact, pulses palpable  PSYCHIATRIC: Cognitive impairment noted.      Labs:  All labs reviewed in the nursing home record and [facility]   @  Lab Results   Component Value Date    WBC 11.7 12/14/2021     Lab Results   Component Value Date    RBC 3.93 12/14/2021     Lab Results   Component Value Date    HGB 10.7 12/14/2021     Lab Results   Component Value Date    HCT 33.2 12/14/2021     Lab Results   Component Value Date    MCV 85 12/14/2021     Lab Results   Component Value Date    MCH 27.2 12/14/2021     Lab Results   Component Value Date    MCHC 32.2 12/14/2021     Lab Results   Component Value Date    RDW 13.6 12/14/2021     Lab Results   Component Value Date     12/14/2021        @Last Comprehensive Metabolic Panel:  Sodium "   Date Value Ref Range Status   12/31/2021 133 (L) 136 - 145 mmol/L Final     Potassium   Date Value Ref Range Status   12/31/2021 4.5 3.5 - 5.0 mmol/L Final     Chloride   Date Value Ref Range Status   12/31/2021 98 98 - 107 mmol/L Final     Carbon Dioxide (CO2)   Date Value Ref Range Status   12/31/2021 26 22 - 31 mmol/L Final     Anion Gap   Date Value Ref Range Status   12/31/2021 9 5 - 18 mmol/L Final     Glucose   Date Value Ref Range Status   12/31/2021 94 70 - 125 mg/dL Final     Urea Nitrogen   Date Value Ref Range Status   12/31/2021 31 (H) 8 - 28 mg/dL Final     Creatinine   Date Value Ref Range Status   12/31/2021 1.08 0.60 - 1.10 mg/dL Final     GFR Estimate   Date Value Ref Range Status   12/31/2021 50 (L) >60 mL/min/1.73m2 Final     Comment:     Effective December 21, 2021 eGFRcr in adults is calculated using the 2021 CKD-EPI creatinine equation which includes age and gender (Soumya et al., NEJ, DOI: 10.1056/SVRUkh2422301)     Calcium   Date Value Ref Range Status   12/31/2021 9.5 8.5 - 10.5 mg/dL Final         Assessment/Plan:    ICD-10-CM    1. Acute on chronic diastolic congestive heart failure (H)  I50.33  patient continues on Lasix 20 mg daily.  Continue to monitor daily weights.  Weight stable.   2. Pneumonia of left lower lobe due to other aerobic gram-negative bacteria (H)  J15.6 Anbx completed.   Currently afebrile.  Follow up CBC.    3. Paroxysmal atrial fibrillation (H)  I48.0  continue metoprolol and apixaban.   4. Acute and chronic respiratory failure with hypoxia (H)  J96.21  continues on oxygen at 2 L.  Attempt to wean off.   5. Urinary retention  R33.9  continues with Driver catheter.  Will attempt to discontinue next week.    6. Stage 3 chronic kidney disease, unspecified whether stage 3a or 3b CKD (H)  N18.30  Follow BMP on Monday.    7. Type 2 diabetes mellitus with diabetic chronic kidney disease, unspecified CKD stage, unspecified whether long term insulin use (H)  E11.22   satisfactory controlled.   8.  Diarrhea   Discontinue culturelle. C diff negative. Antibiotics complete.   Start Metumucil every day for bulking.    9.  Nausea and Vomiting   Mx factorial including coughing, anxiety, GERD and dysphagia.  Give Zofran 4 mg BID before meds and meals.      10.  Dysphagia   She continues on ST.   Discussion of Fees Test.   Change Potassium supplement to liquid for ease of swallowing.    11.   GERD  Continue   omeprazole 20 mg p.o. twice daily.         This note has been dictated using voice recognition software. Any grammatical or context distortions are unintentional and inherent to the software    Electronically signed by: Teresa Villa, CNP

## 2022-01-08 ENCOUNTER — LAB REQUISITION (OUTPATIENT)
Dept: LAB | Facility: CLINIC | Age: 87
End: 2022-01-08
Payer: COMMERCIAL

## 2022-01-08 DIAGNOSIS — J15.9 UNSPECIFIED BACTERIAL PNEUMONIA: ICD-10-CM

## 2022-01-08 DIAGNOSIS — N39.0 URINARY TRACT INFECTION, SITE NOT SPECIFIED: ICD-10-CM

## 2022-01-08 DIAGNOSIS — L30.9 DERMATITIS, UNSPECIFIED: ICD-10-CM

## 2022-01-08 DIAGNOSIS — I50.23 ACUTE ON CHRONIC SYSTOLIC (CONGESTIVE) HEART FAILURE (H): ICD-10-CM

## 2022-01-08 DIAGNOSIS — J30.2 OTHER SEASONAL ALLERGIC RHINITIS: ICD-10-CM

## 2022-01-14 ENCOUNTER — TRANSITIONAL CARE UNIT VISIT (OUTPATIENT)
Dept: GERIATRICS | Facility: CLINIC | Age: 87
End: 2022-01-14
Payer: MEDICARE

## 2022-01-14 ENCOUNTER — LAB REQUISITION (OUTPATIENT)
Dept: LAB | Facility: CLINIC | Age: 87
End: 2022-01-14
Payer: MEDICARE

## 2022-01-14 VITALS
RESPIRATION RATE: 18 BRPM | OXYGEN SATURATION: 97 % | HEART RATE: 99 BPM | TEMPERATURE: 97.6 F | WEIGHT: 155.8 LBS | BODY MASS INDEX: 28.67 KG/M2 | SYSTOLIC BLOOD PRESSURE: 109 MMHG | DIASTOLIC BLOOD PRESSURE: 72 MMHG | HEIGHT: 62 IN

## 2022-01-14 DIAGNOSIS — R13.10 DYSPHAGIA, UNSPECIFIED TYPE: ICD-10-CM

## 2022-01-14 DIAGNOSIS — K21.00 GASTROESOPHAGEAL REFLUX DISEASE WITH ESOPHAGITIS WITHOUT HEMORRHAGE: ICD-10-CM

## 2022-01-14 DIAGNOSIS — I26.99 ACUTE PULMONARY EMBOLISM, UNSPECIFIED PULMONARY EMBOLISM TYPE, UNSPECIFIED WHETHER ACUTE COR PULMONALE PRESENT (H): ICD-10-CM

## 2022-01-14 DIAGNOSIS — I50.33 ACUTE ON CHRONIC DIASTOLIC CONGESTIVE HEART FAILURE (H): ICD-10-CM

## 2022-01-14 DIAGNOSIS — N18.30 STAGE 3 CHRONIC KIDNEY DISEASE, UNSPECIFIED WHETHER STAGE 3A OR 3B CKD (H): ICD-10-CM

## 2022-01-14 DIAGNOSIS — I10 ESSENTIAL (PRIMARY) HYPERTENSION: ICD-10-CM

## 2022-01-14 DIAGNOSIS — R33.9 URINARY RETENTION: ICD-10-CM

## 2022-01-14 PROCEDURE — 99207 PR CDG-HISTORY COMPONENT: MEETS DETAILED - DOWN CODED LACK OF PFSH: CPT | Performed by: FAMILY MEDICINE

## 2022-01-14 PROCEDURE — 99304 1ST NF CARE SF/LOW MDM 25: CPT | Performed by: FAMILY MEDICINE

## 2022-01-14 ASSESSMENT — MIFFLIN-ST. JEOR: SCORE: 1099.95

## 2022-01-14 NOTE — PROGRESS NOTES
[facility] GERIATRIC SERVICES    Facility:  [facility] ([facility]) [[id number]]  Code Status: DNR      CHIEF COMPLAINT/REASON FOR VISIT:  Chief Complaint   Patient presents with     Hospital F/U     TCU Admit       HPI:   Maranda is a 86 year old female who was recently residing here in the TCU for chronic respiratory failure chronic congestive heart failure irritable bowel syndrome diverticulitis anxiety and diabetes.  Her last hospitalization was from 12/7/2021 to 12/10/2021 after mechanical fall and possible aspiration pneumonia.  Give a short course of antibiotics was discharged to TCU on 4 L of O2 4 days in the TCU prompted a readmission to the hospital for onset of nausea and emesis.  Mid right flank pain over the last week.  Blood in her stools and she was also hypotensive and hypoxic.  She also had potassium of 5.5 and creatinine 1.62 and INR 1.3.  VQ scan was consistent with PE in the right lung and start IV heparin and pulmonology saw patient discussed with daughter and patient decided not to pursue any further imaging and opted not to treat with anticoagulation.  Esophageal was esophagram was normal.  Patient had a Driver catheter removed in the hospital with no urinary retention.  She also had sepsis due to undetermined organism and was treated appropriately with antibiotics.    She does have diabetes blood sugars have been checked and they have been fine 101 20.    Patient is sitting in her chair does not appear to be in acute distress is on 1 L of oxygen at this time and she claims that she is back to her baseline on the breathing.  Swallow study was pretty much normal at this time so less concerned about aspiration pneumonia.  She says she has no real pain issues at this time and no chest tightness or shortness of breath.    Past Medical History:  No past medical history on file.        Surgical History:  No past surgical history on file.    Family History:   No family history on file.    Social History:     Social History     Socioeconomic History     Marital status:      Spouse name: Not on file     Number of children: Not on file     Years of education: Not on file     Highest education level: Not on file   Occupational History     Not on file   Tobacco Use     Smoking status: Unknown If Ever Smoked     Smokeless tobacco: Never Used   Substance and Sexual Activity     Alcohol use: Not on file     Drug use: Not on file     Sexual activity: Not on file   Other Topics Concern     Not on file   Social History Narrative     Not on file     Social Determinants of Health     Financial Resource Strain: Not on file   Food Insecurity: Not on file   Transportation Needs: Not on file   Physical Activity: Not on file   Stress: Not on file   Social Connections: Not on file   Intimate Partner Violence: Not on file   Housing Stability: Not on file       Post Discharge Medication Reconciliation Status: discharge medications reconciled, continue medications without change    Current Outpatient Medications   Medication Sig     acetaminophen (TYLENOL) 325 MG tablet Take 325 mg by mouth every 4 hours as needed for mild pain     albuterol (PROVENTIL) (2.5 MG/3ML) 0.083% neb solution Take 2.5 mg by nebulization 4 times daily as needed for shortness of breath / dyspnea or wheezing Also give scheduled 3 times daily     fluticasone (FLONASE) 50 MCG/ACT nasal spray 2 sprays daily     furosemide (LASIX) 20 MG tablet Take 20 mg by mouth daily     lactobacillus rhamnosus, GG, (CULTURELL) capsule Take 1 capsule by mouth 2 times daily     loperamide (IMODIUM) 2 MG capsule Take 2 mg by mouth 3 times daily as needed for diarrhea     LORazepam (ATIVAN) 1 MG tablet Take 1 mg by mouth 2 times daily as needed for anxiety     losartan (COZAAR) 25 MG tablet Take 25 mg by mouth 2 times daily     omeprazole (PRILOSEC OTC) 20 MG EC tablet Take 20 mg by mouth 2 times daily      ondansetron (ZOFRAN) 4 MG tablet Take by mouth 2 times daily Give 30  minutes prior to meals and meds.     potassium chloride ER (K-TAB) 20 MEQ CR tablet Take 20 mEq by mouth 2 times daily     psyllium (METAMUCIL/KONSYL) Packet Take 1 packet by mouth daily     sertraline (ZOLOFT) 50 MG tablet Take 50 mg by mouth daily     simvastatin (ZOCOR) 20 MG tablet Take 20 mg by mouth At Bedtime     No current facility-administered medications for this visit.       REVIEW OF SYSTEM: Patient denies any pain fevers chills nausea vomit diarrhea change in vision hearing taste or smell weakness one-sided other chest pain shortness of breath.  Denies any current shortness stool polyphagia polydipsia polyuria depression or anxiety and the remainder the review of systems is negative.      PHYSICAL EXAM: Head was normocephalic and atraumatic sclera conjunctiva is clear mucosas moist nasal discharge.  Heart sounds were regular lungs clear abdomen soft nontender.  Extremities showed 2+ to 3+ pitting edema bilateral.  Homans' sign was negative and there is no calf tenderness or signs of any DVT by physical exam.  Neurologic exam was nonfocal and affect was pleasant.        LABS: Hospital labs are as follows; potassium was 4.3, platelets 156,000, hemoglobin was 10.4, hematocrit 33.0, x-ray of the esophagus said normal esophagram.  Swallow study done on 1/12/2022 indicated normal swallowing of thin liquid by teaspoon cup straw and mildly thick liquid cup pudding by teaspoon cereal by teaspoon and solid cracker.  Normal swallow study.  Basic metabolic profile was all within normal limits on 1/12/2022 with the exception of calcium was 8.3.    Vital; blood pressure 109/72    Pulse is 99    Temperature is 97.6    Respirations 18    O2 sats 97%.      ASSESSMENT:    Encounter Diagnoses   Name Primary?     Meconium aspiration pneumonia, unspecified laterality, unspecified part of lung Yes     Acute on chronic diastolic congestive heart failure (H)      Dysphagia, unspecified type      Stage 3 chronic kidney disease,  unspecified whether stage 3a or 3b CKD (H)      Acute pulmonary embolism, unspecified pulmonary embolism type, unspecified whether acute cor pulmonale present (H)      Gastroesophageal reflux disease with esophagitis without hemorrhage      Urinary retention         PLAN: Plan at this time speech therapy to evaluate and treat secondary aspiration pneumonia.    Hemoglobin done Monday secondary recent rectal bleed and we will continue stool softeners.  And no Imodium at this time.    Basic metabolic profile be done Monday second chronic kidney disease.    NELI hose on a.m. off in p.m.  Unlikely is any blood clot in there at this time per physical exam.    Weights daily.    I will continue to monitor above medical problems and no other changes to care plan at this time.  Care plan was reviewed and is appropriate.        Electronically signed by: TYRELL CARRIZALES DO

## 2022-01-14 NOTE — LETTER
1/14/2022        RE: Maranda Downing  [address] [id number]        No notes on file      Sincerely,        TYRELL CARRIZALES, DO

## 2022-01-15 ENCOUNTER — LAB REQUISITION (OUTPATIENT)
Dept: LAB | Facility: CLINIC | Age: 87
End: 2022-01-15
Payer: COMMERCIAL

## 2022-01-15 DIAGNOSIS — N18.31 CHRONIC KIDNEY DISEASE, STAGE 3A (H): ICD-10-CM

## 2022-01-17 ENCOUNTER — TRANSITIONAL CARE UNIT VISIT (OUTPATIENT)
Dept: GERIATRICS | Facility: CLINIC | Age: 87
End: 2022-01-17
Payer: MEDICARE

## 2022-01-17 VITALS
WEIGHT: 159.8 LBS | TEMPERATURE: 97.6 F | RESPIRATION RATE: 17 BRPM | OXYGEN SATURATION: 96 % | HEIGHT: 62 IN | HEART RATE: 108 BPM | BODY MASS INDEX: 29.4 KG/M2 | SYSTOLIC BLOOD PRESSURE: 112 MMHG | DIASTOLIC BLOOD PRESSURE: 71 MMHG

## 2022-01-17 DIAGNOSIS — N18.30 STAGE 3 CHRONIC KIDNEY DISEASE, UNSPECIFIED WHETHER STAGE 3A OR 3B CKD (H): ICD-10-CM

## 2022-01-17 DIAGNOSIS — I50.33 ACUTE ON CHRONIC DIASTOLIC CONGESTIVE HEART FAILURE (H): ICD-10-CM

## 2022-01-17 DIAGNOSIS — K21.00 GASTROESOPHAGEAL REFLUX DISEASE WITH ESOPHAGITIS WITHOUT HEMORRHAGE: ICD-10-CM

## 2022-01-17 DIAGNOSIS — J96.21 ACUTE AND CHRONIC RESPIRATORY FAILURE WITH HYPOXIA (H): ICD-10-CM

## 2022-01-17 DIAGNOSIS — I48.0 PAROXYSMAL ATRIAL FIBRILLATION (H): ICD-10-CM

## 2022-01-17 DIAGNOSIS — J15.69 PNEUMONIA OF LEFT LOWER LOBE DUE TO OTHER AEROBIC GRAM-NEGATIVE BACTERIA (H): ICD-10-CM

## 2022-01-17 DIAGNOSIS — R13.10 DYSPHAGIA, UNSPECIFIED TYPE: ICD-10-CM

## 2022-01-17 DIAGNOSIS — I26.99 ACUTE PULMONARY EMBOLISM, UNSPECIFIED PULMONARY EMBOLISM TYPE, UNSPECIFIED WHETHER ACUTE COR PULMONALE PRESENT (H): ICD-10-CM

## 2022-01-17 PROBLEM — J47.0 BRONCHIECTASIS WITH ACUTE LOWER RESPIRATORY INFECTION (H): Status: ACTIVE | Noted: 2022-01-08

## 2022-01-17 PROBLEM — A41.9 SEPSIS DUE TO UNDETERMINED ORGANISM (H): Status: ACTIVE | Noted: 2022-01-08

## 2022-01-17 PROBLEM — K62.5 FRESH BLOOD PASSED PER RECTUM: Status: ACTIVE | Noted: 2022-01-08

## 2022-01-17 PROBLEM — R91.8 ABNORMAL CT SCAN OF LUNG: Status: ACTIVE | Noted: 2022-01-09

## 2022-01-17 LAB
ANION GAP SERPL CALCULATED.3IONS-SCNC: 9 MMOL/L (ref 5–18)
BUN SERPL-MCNC: 11 MG/DL (ref 8–28)
CALCIUM SERPL-MCNC: 9 MG/DL (ref 8.5–10.5)
CHLORIDE BLD-SCNC: 102 MMOL/L (ref 98–107)
CO2 SERPL-SCNC: 28 MMOL/L (ref 22–31)
CREAT SERPL-MCNC: 0.84 MG/DL (ref 0.6–1.1)
GFR SERPL CREATININE-BSD FRML MDRD: 67 ML/MIN/1.73M2
GLUCOSE BLD-MCNC: 82 MG/DL (ref 70–125)
HGB BLD-MCNC: 9.2 G/DL (ref 11.7–15.7)
POTASSIUM BLD-SCNC: 4.4 MMOL/L (ref 3.5–5)
SODIUM SERPL-SCNC: 139 MMOL/L (ref 136–145)

## 2022-01-17 PROCEDURE — 36415 COLL VENOUS BLD VENIPUNCTURE: CPT | Performed by: FAMILY MEDICINE

## 2022-01-17 PROCEDURE — 85018 HEMOGLOBIN: CPT | Performed by: FAMILY MEDICINE

## 2022-01-17 PROCEDURE — 80048 BASIC METABOLIC PNL TOTAL CA: CPT | Performed by: FAMILY MEDICINE

## 2022-01-17 PROCEDURE — P9604 ONE-WAY ALLOW PRORATED TRIP: HCPCS | Performed by: FAMILY MEDICINE

## 2022-01-17 PROCEDURE — 99309 SBSQ NF CARE MODERATE MDM 30: CPT | Performed by: NURSE PRACTITIONER

## 2022-01-17 ASSESSMENT — MIFFLIN-ST. JEOR: SCORE: 1118.1

## 2022-01-18 NOTE — PROGRESS NOTES
[facility] GERIATRIC SERVICES    Code Status:  FULL CODE   Visit Type:   Chief Complaint   Patient presents with     Nursing Home Acute     Facility:  [facility] ([facility]) [[id number]]           History of Present Illness: Maranda Downing is a 86 year old female who I am seeing today for follow-up on the TCU.  Patient initially hospitalized at [facility] from 12/7 to 12/10/2021 after a mechanical fall.  Patient rehospitalized on 1/8/2022 secondary to abdominal pain with blood in her stools.  Patient was noted to have hemorrhoids.  Patient was hypotensive and hypoxic.  Upon admit to the hospital she had a potassium of 5.5 with creatinine of 1.62.  INR was 1.3.  She underwent a VQ scan which was consistent with PE in the right lung.  She was initially treated with IV heparin.  Her and her daughter met with the pulmonologist and decided against further imaging and/or anticoagulation.  Esophagram was normal.  Patient had her Driver catheter removed during hospitalization with no further urinary retention.  She continued with sepsis due to undetermined organism and was treated with antibiotics.  Patient with history of left lower lobe pneumonia.  She continues on oxygen at 1 L.  CHF on Lasix.  Hypertension on losartan and Coreg.  Diabetes type 2 with long-term use of insulin.  Stage III chronic kidney disease.    On today's visit patient sitting up in bedside chair.  Patient continues on 1 L of oxygen.  We are attempting to wean.  She reports her shortness of breath is greatly improved.  She continues with bilateral lower extremity edema 2-3+.  She is on Lasix.  Positive pulmonary embolism.  She is currently on no anticoagulation.  She was previously on apixaban for atrial fib.  Blood pressure satisfactory.  Hemoglobin 9.2.  Diabetes mellitus type 2.  Blood sugars satisfactory.  Driver catheter removed.  Patient voiding adequately.  Patient reports nausea and vomiting greatly improved.  No further vomiting.  She  does continue on Zofran for her nausea.  Patient reports history of chronic diarrhea.      Active Ambulatory Problems     Diagnosis Date Noted     Type 2 diabetes mellitus with diabetic chronic kidney disease, unspecified CKD stage, unspecified whether long term insulin use (H) 02/11/2013     Chronic diarrhea 12/07/2021     Anxiety state 01/06/2011     Allergic rhinitis 05/22/2009     AK (actinic keratosis) 01/24/2013     Acquired absence of other left toe(s) (H) 02/12/2020     Abdominal pain 06/16/2014     Coronary artery disease of native artery of native heart with stable angina pectoris (H) 05/20/2009     Cystitis cystica 05/22/2009     Constipation 04/20/2021     Dysesthesia 05/22/2009     Fall 12/07/2021     Fatty liver 01/16/2012     Fibromyalgia 08/02/2012     Gastroesophageal reflux disease without esophagitis 07/28/2011     Genital herpes 03/13/2015     Hammertoe of left foot 02/06/2019     Hypercalcemia 01/24/2013     Hypercholesteremia 12/13/2021     Hypertension 12/13/2021     Hypokalemia 04/20/2021     Hypoxia 12/07/2021     Osteoporosis 05/22/2009     OA (osteoarthritis) of knee 03/14/2013     Nausea & vomiting 06/16/2014     Mild cognitive impairment 06/29/2009     Midline thoracic back pain 06/23/2015     Irritable bowel syndrome 05/22/2009     Overflow diarrhea 03/12/2014     Vitamin B 12 deficiency 05/22/2009     Stage 3 chronic kidney disease, unspecified whether stage 3a or 3b CKD (H) 01/29/2021     Sensorineural hearing loss, bilateral 01/17/2019     Rosacea 05/22/2009     Right bundle branch block 03/20/2014     Pulmonary hypertension (H) 01/29/2021     PPD positive 06/29/2009     Paroxysmal atrial tachycardia (H) 12/07/2021     Noninfectious gastroenteritis 04/25/2021     Elevated troponin 12/14/2021     Diverticular disease 12/27/2021     Acute on chronic diastolic congestive heart failure (H) 12/14/2021     Urinary retention 01/03/2022     Acute cystitis without hematuria 01/03/2022      History of aspiration pneumonia 01/03/2022     Paroxysmal atrial fibrillation (H) 01/03/2022     Acute and chronic respiratory failure with hypoxia (H) 01/03/2022     Failure to attend appointment 01/03/2022     Pneumonia of left lower lobe due to other aerobic gram-negative bacteria (H) 01/06/2022     Sepsis due to undetermined organism (H) 01/08/2022     Fresh blood passed per rectum 01/08/2022     Bronchiectasis with acute lower respiratory infection (H) 01/08/2022     Abnormal CT scan of lung 01/09/2022     Meconium aspiration pneumonia, unspecified laterality, unspecified part of lung 01/17/2022     Dysphagia, unspecified type 01/17/2022     Acute pulmonary embolism, unspecified pulmonary embolism type, unspecified whether acute cor pulmonale present (H) 01/17/2022     Gastroesophageal reflux disease with esophagitis without hemorrhage 01/17/2022     Resolved Ambulatory Problems     Diagnosis Date Noted     No Resolved Ambulatory Problems     No Additional Past Medical History       Current Outpatient Medications:      acetaminophen (TYLENOL) 325 MG tablet, Take 325 mg by mouth every 4 hours as needed for mild pain, Disp: , Rfl:      albuterol (PROVENTIL) (2.5 MG/3ML) 0.083% neb solution, Take 2.5 mg by nebulization 4 times daily as needed for shortness of breath / dyspnea or wheezing Also give scheduled 3 times daily, Disp: , Rfl:      fluticasone (FLONASE) 50 MCG/ACT nasal spray, 2 sprays daily, Disp: , Rfl:      furosemide (LASIX) 20 MG tablet, Take 20 mg by mouth daily, Disp: , Rfl:      lactobacillus rhamnosus, GG, (CULTURELL) capsule, Take 1 capsule by mouth 2 times daily, Disp: , Rfl:      loperamide (IMODIUM) 2 MG capsule, Take 2 mg by mouth 3 times daily as needed for diarrhea, Disp: , Rfl:      LORazepam (ATIVAN) 1 MG tablet, Take 1 mg by mouth 2 times daily as needed for anxiety, Disp: , Rfl:      losartan (COZAAR) 25 MG tablet, Take 25 mg by mouth 2 times daily, Disp: , Rfl:      omeprazole  "(PRILOSEC OTC) 20 MG EC tablet, Take 20 mg by mouth 2 times daily , Disp: , Rfl:      ondansetron (ZOFRAN) 4 MG tablet, Take by mouth 2 times daily Give 30 minutes prior to meals and meds., Disp: , Rfl:      potassium chloride ER (K-TAB) 20 MEQ CR tablet, Take 20 mEq by mouth 2 times daily, Disp: , Rfl:      psyllium (METAMUCIL/KONSYL) Packet, Take 1 packet by mouth daily, Disp: , Rfl:      sertraline (ZOLOFT) 50 MG tablet, Take 50 mg by mouth daily, Disp: , Rfl:      simvastatin (ZOCOR) 20 MG tablet, Take 20 mg by mouth At Bedtime, Disp: , Rfl:   Allergies   Allergen Reactions     Codeine Nausea     Hydrocodone      Other reaction(s): GI Upset  nausea     Levofloxacin      Other reaction(s): Intolerance-Can't Take  Made leg pain worse.     Lisinopril Cough     Morphine Nausea and Vomiting     Other reaction(s): Nausea/Vomiting     Sulfa Drugs Nausea     Penicillins Rash       All Meds and Allergies reviewed in the record at the facility and is the most up-to-date    REVIEW OF SYSTEMS:   Review of Systems  No fevers or chills. No headache, lightheadedness or dizziness.  SOB improving, she continues on oxygen at 1 L, no chest pains or palpitations. Appetite is fair. Chronic diarrhea.  3-4 loose stools a day.  Nausea greatly improved with Zofran.  No further vomiting.  Patient recently started on omeprazole.  + Dysphagia.  No further urinary retention.  Otherwise review of systems are negative.     PHYSICAL EXAMINATION:  Physical Exam     Vital signs: /71   Pulse 108   Temp 97.6  F (36.4  C)   Resp 17   Ht 1.575 m (5' 2\")   Wt 72.5 kg (159 lb 12.8 oz)   SpO2 96%   BMI 29.23 kg/m    General: Awake, Alert, oriented x1, appropriately, follows simple commands, conversant  HEENT:Pink conjunctiva, anicteric sclerae, moist oral mucosa  NECK: Supple, without any lymphadenopathy, or masses  CVS:  S1  S2, without murmur or gallop.   LUNG: No wheezes rales or rhonchi.  Continues on oxygen 1 L.   BACK: No kyphosis of " the thoracic spine  ABDOMEN: Soft, nontender to palpation, with positive bowel sounds  : Driver catheter draining clear yellow urine.  EXTREMITIES: Moves both upper and lower extremities with generalized weakness, 2-3+pedal edema, no calf tenderness  SKIN: Warm and dry, no rashes or erythema noted  NEUROLOGIC: Intact, pulses palpable  PSYCHIATRIC: Mild cognitive impairment noted.      Labs:  All labs reviewed in the nursing home record and Epic   @  Lab Results   Component Value Date    WBC 11.7 12/14/2021     Lab Results   Component Value Date    RBC 3.93 12/14/2021     Lab Results   Component Value Date    HGB 10.7 12/14/2021     Lab Results   Component Value Date    HCT 33.2 12/14/2021     Lab Results   Component Value Date    MCV 85 12/14/2021     Lab Results   Component Value Date    MCH 27.2 12/14/2021     Lab Results   Component Value Date    MCHC 32.2 12/14/2021     Lab Results   Component Value Date    RDW 13.6 12/14/2021     Lab Results   Component Value Date     12/14/2021        @Last Comprehensive Metabolic Panel:  Sodium   Date Value Ref Range Status   01/17/2022 139 136 - 145 mmol/L Final     Potassium   Date Value Ref Range Status   01/17/2022 4.4 3.5 - 5.0 mmol/L Final     Chloride   Date Value Ref Range Status   01/17/2022 102 98 - 107 mmol/L Final     Carbon Dioxide (CO2)   Date Value Ref Range Status   01/17/2022 28 22 - 31 mmol/L Final     Anion Gap   Date Value Ref Range Status   01/17/2022 9 5 - 18 mmol/L Final     Glucose   Date Value Ref Range Status   01/17/2022 82 70 - 125 mg/dL Final     Urea Nitrogen   Date Value Ref Range Status   01/17/2022 11 8 - 28 mg/dL Final     Creatinine   Date Value Ref Range Status   01/17/2022 0.84 0.60 - 1.10 mg/dL Final     GFR Estimate   Date Value Ref Range Status   01/17/2022 67 >60 mL/min/1.73m2 Final     Comment:     Effective December 21, 2021 eGFRcr in adults is calculated using the 2021 CKD-EPI creatinine equation which includes age and  gender (Soumya boggs al., [facility], DOI: 10.1056/YDULwb3057103)     Calcium   Date Value Ref Range Status   01/17/2022 9.0 8.5 - 10.5 mg/dL Final         Assessment/Plan:    ICD-10-CM    1. Acute pulmonary embolism, unspecified pulmonary embolism type, unspecified whether acute cor pulmonale present (H)  I26.99  Currently not anticoagulated.  Denies any chest pain.  Reports shortness of breath is improving.   2. Pneumonia of left lower lobe due to other aerobic gram-negative bacteria (H)  J15.6  Patient has completed her oral antibiotic.   3. Acute on chronic diastolic congestive heart failure (H)  I50.33  Increased lower extremity edema 2-3+.  Continues on Lasix.  Apply NELI hose on in a.m. off at at bedtime.  Continue daily weights.   4. Dysphagia, unspecified type  R13.10  Continues on speech therapy.  Nausea improved with Zofran.  No further emesis.   5. Stage 3 chronic kidney disease, unspecified whether stage 3a or 3b CKD (H)  N18.30  Continue to monitor.  Follow-up BMP on Thursday.   6. Gastroesophageal reflux disease with esophagitis without hemorrhage  K21.00  continue PPI.   7. Paroxysmal atrial fibrillation (H)  I48.0  No longer on anticoagulation.   8. Acute and chronic respiratory failure with hypoxia (H)  J96.21  Attempt to wean off oxygen.  Continues at 1 L.     10.   GERD  Continue   omeprazole 20 mg p.o. twice daily.         This note has been dictated using voice recognition software. Any grammatical or context distortions are unintentional and inherent to the software    Electronically signed by: Teresa Villa CNP

## 2022-01-20 ENCOUNTER — TRANSITIONAL CARE UNIT VISIT (OUTPATIENT)
Dept: GERIATRICS | Facility: CLINIC | Age: 87
End: 2022-01-20
Payer: MEDICARE

## 2022-01-20 VITALS
WEIGHT: 158 LBS | SYSTOLIC BLOOD PRESSURE: 126 MMHG | HEART RATE: 94 BPM | HEIGHT: 62 IN | TEMPERATURE: 97 F | RESPIRATION RATE: 15 BRPM | BODY MASS INDEX: 29.08 KG/M2 | OXYGEN SATURATION: 97 % | DIASTOLIC BLOOD PRESSURE: 77 MMHG

## 2022-01-20 DIAGNOSIS — I48.0 PAROXYSMAL ATRIAL FIBRILLATION (H): ICD-10-CM

## 2022-01-20 DIAGNOSIS — N18.30 STAGE 3 CHRONIC KIDNEY DISEASE, UNSPECIFIED WHETHER STAGE 3A OR 3B CKD (H): ICD-10-CM

## 2022-01-20 DIAGNOSIS — J15.69 PNEUMONIA OF LEFT LOWER LOBE DUE TO OTHER AEROBIC GRAM-NEGATIVE BACTERIA (H): ICD-10-CM

## 2022-01-20 DIAGNOSIS — K21.00 GASTROESOPHAGEAL REFLUX DISEASE WITH ESOPHAGITIS WITHOUT HEMORRHAGE: ICD-10-CM

## 2022-01-20 DIAGNOSIS — J96.21 ACUTE AND CHRONIC RESPIRATORY FAILURE WITH HYPOXIA (H): ICD-10-CM

## 2022-01-20 DIAGNOSIS — R13.10 DYSPHAGIA, UNSPECIFIED TYPE: ICD-10-CM

## 2022-01-20 DIAGNOSIS — I26.99 ACUTE PULMONARY EMBOLISM, UNSPECIFIED PULMONARY EMBOLISM TYPE, UNSPECIFIED WHETHER ACUTE COR PULMONALE PRESENT (H): Primary | ICD-10-CM

## 2022-01-20 DIAGNOSIS — I50.33 ACUTE ON CHRONIC DIASTOLIC CONGESTIVE HEART FAILURE (H): ICD-10-CM

## 2022-01-20 PROCEDURE — 99309 SBSQ NF CARE MODERATE MDM 30: CPT | Performed by: NURSE PRACTITIONER

## 2022-01-20 ASSESSMENT — MIFFLIN-ST. JEOR: SCORE: 1109.93

## 2022-01-21 NOTE — PROGRESS NOTES
[facility] GERIATRIC SERVICES    Code Status:  FULL CODE   Visit Type:   Chief Complaint   Patient presents with     Nursing Home Acute     TCU Follow up     Facility:  [facility] ([facility]) [[id number]]           History of Present Illness: Maranda Downing is a 86 year old female who I am seeing today for follow-up on the TCU.  Patient initially hospitalized at [facility] from 12/7 to 12/10/2021 after a mechanical fall.  Patient rehospitalized on 1/8/2022 secondary to abdominal pain with blood in her stools.  Patient was noted to have hemorrhoids.  Patient was hypotensive and hypoxic.  Upon admit to the hospital she had a potassium of 5.5 with creatinine of 1.62.  INR was 1.3.  She underwent a VQ scan which was consistent with PE in the right lung.  She was initially treated with IV heparin.  Her and her daughter met with the pulmonologist and decided against further imaging and/or anticoagulation.  Esophagram was normal.  Patient had her Driver catheter removed during hospitalization with no further urinary retention.  She continued with sepsis due to undetermined organism and was treated with antibiotics.  Patient with history of left lower lobe pneumonia.  She continues on oxygen at 1 L.  CHF on Lasix.  Hypertension on losartan and Coreg.  Diabetes type 2 with long-term use of insulin.  Stage III chronic kidney disease.    On today's visit patient sitting up in bedside chair.  Patient appears to be improving.  She is now on oxygen at 1 L.  She denies any shortness of breath.  She denies any cough.  Lung sounds clear on today's visit.  Underlying CHF.  Compensated with Lasix.  She does have 2+ lower extremity edema.  Weight has been stable.  Recent pulmonary embolism.  She is currently not on any anticoagulation secondary to recent GI bleed.  No evidence of bleed.  Hemoglobin stable at 9.2.  She was previously on apixaban for underlying atrial fib.  Now rate controlled.  Patient denies any chest pain.  No  palpitations.  Diabetes mellitus type 2.  Blood sugar satisfactory.  History urinary retention.  Driver catheter has been removed and patient is voiding adequately.  Patient with previous nausea and vomiting improved with the use of as needed Zofran and scheduled omeprazole.  Chronic diarrhea.  She continues on as needed Imodium.        Active Ambulatory Problems     Diagnosis Date Noted     Type 2 diabetes mellitus with diabetic chronic kidney disease, unspecified CKD stage, unspecified whether long term insulin use (H) 02/11/2013     Chronic diarrhea 12/07/2021     Anxiety state 01/06/2011     Allergic rhinitis 05/22/2009     AK (actinic keratosis) 01/24/2013     Acquired absence of other left toe(s) (H) 02/12/2020     Abdominal pain 06/16/2014     Coronary artery disease of native artery of native heart with stable angina pectoris (H) 05/20/2009     Cystitis cystica 05/22/2009     Constipation 04/20/2021     Dysesthesia 05/22/2009     Fall 12/07/2021     Fatty liver 01/16/2012     Fibromyalgia 08/02/2012     Gastroesophageal reflux disease without esophagitis 07/28/2011     Genital herpes 03/13/2015     Hammertoe of left foot 02/06/2019     Hypercalcemia 01/24/2013     Hypercholesteremia 12/13/2021     Hypertension 12/13/2021     Hypokalemia 04/20/2021     Hypoxia 12/07/2021     Osteoporosis 05/22/2009     OA (osteoarthritis) of knee 03/14/2013     Nausea & vomiting 06/16/2014     Mild cognitive impairment 06/29/2009     Midline thoracic back pain 06/23/2015     Irritable bowel syndrome 05/22/2009     Overflow diarrhea 03/12/2014     Vitamin B 12 deficiency 05/22/2009     Stage 3 chronic kidney disease, unspecified whether stage 3a or 3b CKD (H) 01/29/2021     Sensorineural hearing loss, bilateral 01/17/2019     Rosacea 05/22/2009     Right bundle branch block 03/20/2014     Pulmonary hypertension (H) 01/29/2021     PPD positive 06/29/2009     Paroxysmal atrial tachycardia (H) 12/07/2021     Noninfectious  gastroenteritis 04/25/2021     Elevated troponin 12/14/2021     Diverticular disease 12/27/2021     Acute on chronic diastolic congestive heart failure (H) 12/14/2021     Urinary retention 01/03/2022     Acute cystitis without hematuria 01/03/2022     History of aspiration pneumonia 01/03/2022     Paroxysmal atrial fibrillation (H) 01/03/2022     Acute and chronic respiratory failure with hypoxia (H) 01/03/2022     Failure to attend appointment 01/03/2022     Pneumonia of left lower lobe due to other aerobic gram-negative bacteria (H) 01/06/2022     Sepsis due to undetermined organism (H) 01/08/2022     Fresh blood passed per rectum 01/08/2022     Bronchiectasis with acute lower respiratory infection (H) 01/08/2022     Abnormal CT scan of lung 01/09/2022     Meconium aspiration pneumonia, unspecified laterality, unspecified part of lung 01/17/2022     Dysphagia, unspecified type 01/17/2022     Acute pulmonary embolism, unspecified pulmonary embolism type, unspecified whether acute cor pulmonale present (H) 01/17/2022     Gastroesophageal reflux disease with esophagitis without hemorrhage 01/17/2022     Resolved Ambulatory Problems     Diagnosis Date Noted     No Resolved Ambulatory Problems     No Additional Past Medical History       Current Outpatient Medications:      acetaminophen (TYLENOL) 325 MG tablet, Take 325 mg by mouth every 4 hours as needed for mild pain, Disp: , Rfl:      albuterol (PROVENTIL) (2.5 MG/3ML) 0.083% neb solution, Take 2.5 mg by nebulization 4 times daily as needed for shortness of breath / dyspnea or wheezing Also give scheduled 3 times daily, Disp: , Rfl:      fluticasone (FLONASE) 50 MCG/ACT nasal spray, 2 sprays daily, Disp: , Rfl:      furosemide (LASIX) 20 MG tablet, Take 20 mg by mouth daily, Disp: , Rfl:      lactobacillus rhamnosus, GG, (CULTURELL) capsule, Take 1 capsule by mouth 2 times daily, Disp: , Rfl:      loperamide (IMODIUM) 2 MG capsule, Take 2 mg by mouth 3 times daily  "as needed for diarrhea, Disp: , Rfl:      LORazepam (ATIVAN) 1 MG tablet, Take 1 mg by mouth 2 times daily as needed for anxiety, Disp: , Rfl:      losartan (COZAAR) 25 MG tablet, Take 25 mg by mouth 2 times daily, Disp: , Rfl:      omeprazole (PRILOSEC OTC) 20 MG EC tablet, Take 20 mg by mouth 2 times daily , Disp: , Rfl:      ondansetron (ZOFRAN) 4 MG tablet, Take by mouth 2 times daily Give 30 minutes prior to meals and meds., Disp: , Rfl:      potassium chloride ER (K-TAB) 20 MEQ CR tablet, Take 20 mEq by mouth 2 times daily, Disp: , Rfl:      psyllium (METAMUCIL/KONSYL) Packet, Take 1 packet by mouth daily, Disp: , Rfl:      sertraline (ZOLOFT) 50 MG tablet, Take 50 mg by mouth daily, Disp: , Rfl:      simvastatin (ZOCOR) 20 MG tablet, Take 20 mg by mouth At Bedtime, Disp: , Rfl:   Allergies   Allergen Reactions     Codeine Nausea     Hydrocodone      Other reaction(s): GI Upset  nausea     Levofloxacin      Other reaction(s): Intolerance-Can't Take  Made leg pain worse.     Lisinopril Cough     Morphine Nausea and Vomiting     Other reaction(s): Nausea/Vomiting     Sulfa Drugs Nausea     Penicillins Rash       All Meds and Allergies reviewed in the record at the facility and is the most up-to-date    REVIEW OF SYSTEMS:   Review of Systems  No fevers or chills. No headache, lightheadedness or dizziness.  SOB improving, she continues on oxygen at 1 L, no chest pains or palpitations. Appetite is fair. Chronic diarrhea on as needed Imodium.  Nausea greatly improved with Zofran.  No further vomiting.  Patient recently started on omeprazole.  + Dysphagia.  No further urinary retention.  Otherwise review of systems are negative.     PHYSICAL EXAMINATION:  Physical Exam     Vital signs: /77   Pulse 94   Temp 97  F (36.1  C)   Resp 15   Ht 1.575 m (5' 2\")   Wt 71.7 kg (158 lb)   SpO2 97%   BMI 28.90 kg/m    General: Awake, Alert, oriented x1, appropriately, follows simple commands, conversant  HEENT:Pink " conjunctiva, anicteric sclerae, moist oral mucosa  NECK: Supple, without any lymphadenopathy, or masses  CVS:  S1  S2, without murmur or gallop.   LUNG: No wheezes rales or rhonchi.  Continues on oxygen 1 L.   BACK: No kyphosis of the thoracic spine  ABDOMEN: Soft, nontender to palpation, with positive bowel sounds  : Driver catheter draining clear yellow urine.  EXTREMITIES: Moves both upper and lower extremities with generalized weakness, 2-3+pedal edema, no calf tenderness  SKIN: Warm and dry, no rashes or erythema noted  NEUROLOGIC: Intact, pulses palpable  PSYCHIATRIC: Mild cognitive impairment noted.      Labs:  All labs reviewed in the nursing home record and Epic   @  Lab Results   Component Value Date    WBC 11.7 12/14/2021     Lab Results   Component Value Date    RBC 3.93 12/14/2021     Lab Results   Component Value Date    HGB 10.7 12/14/2021     Lab Results   Component Value Date    HCT 33.2 12/14/2021     Lab Results   Component Value Date    MCV 85 12/14/2021     Lab Results   Component Value Date    MCH 27.2 12/14/2021     Lab Results   Component Value Date    MCHC 32.2 12/14/2021     Lab Results   Component Value Date    RDW 13.6 12/14/2021     Lab Results   Component Value Date     12/14/2021        @Last Comprehensive Metabolic Panel:  Sodium   Date Value Ref Range Status   01/17/2022 139 136 - 145 mmol/L Final     Potassium   Date Value Ref Range Status   01/17/2022 4.4 3.5 - 5.0 mmol/L Final     Chloride   Date Value Ref Range Status   01/17/2022 102 98 - 107 mmol/L Final     Carbon Dioxide (CO2)   Date Value Ref Range Status   01/17/2022 28 22 - 31 mmol/L Final     Anion Gap   Date Value Ref Range Status   01/17/2022 9 5 - 18 mmol/L Final     Glucose   Date Value Ref Range Status   01/17/2022 82 70 - 125 mg/dL Final     Urea Nitrogen   Date Value Ref Range Status   01/17/2022 11 8 - 28 mg/dL Final     Creatinine   Date Value Ref Range Status   01/17/2022 0.84 0.60 - 1.10 mg/dL Final      GFR Estimate   Date Value Ref Range Status   01/17/2022 67 >60 mL/min/1.73m2 Final     Comment:     Effective December 21, 2021 eGFRcr in adults is calculated using the 2021 CKD-EPI creatinine equation which includes age and gender (Soumya et al., NEJ, DOI: 10.1056/QNSYfd4973578)     Calcium   Date Value Ref Range Status   01/17/2022 9.0 8.5 - 10.5 mg/dL Final         Assessment/Plan:    ICD-10-CM    1. Acute pulmonary embolism, unspecified pulmonary embolism type, unspecified whether acute cor pulmonale present (H)  I26.99  Currently not anticoagulated secondary to recent GI bleed.  Denies any chest pain.  Reports shortness of breath is improving.   2. Pneumonia of left lower lobe due to other aerobic gram-negative bacteria (H)  J15.6  Patient has completed her oral antibiotic.   3. Acute on chronic diastolic congestive heart failure (H)  I50.33 lower extremity edema 2-3+.  Continues on Lasix.  Okay for lymphedema therapy eval and treat.  Continue daily weights.  Stable.   4. Dysphagia, unspecified type  R13.10  Continues on speech therapy.  Nausea improved with Zofran and scheduled omeprazole.  No further emesis.   5. Stage 3 chronic kidney disease, unspecified whether stage 3a or 3b CKD (H)  N18.30  Continue to monitor.   Follow-up CBC and BMP on Monday.   6. Gastroesophageal reflux disease with esophagitis without hemorrhage  K21.00  continue PPI.   7. Paroxysmal atrial fibrillation (H)  I48.0  No longer on anticoagulation.   8. Acute and chronic respiratory failure with hypoxia (H)  J96.21  Attempt to wean off oxygen.  Continues at 1 L.     10.   GERD  Continue   omeprazole 20 mg p.o. twice daily.         This note has been dictated using voice recognition software. Any grammatical or context distortions are unintentional and inherent to the software    Electronically signed by: Teresa Villa CNP

## 2022-01-23 ENCOUNTER — LAB REQUISITION (OUTPATIENT)
Dept: LAB | Facility: CLINIC | Age: 87
End: 2022-01-23
Payer: MEDICARE

## 2022-01-23 DIAGNOSIS — I50.23 ACUTE ON CHRONIC SYSTOLIC (CONGESTIVE) HEART FAILURE (H): ICD-10-CM

## 2022-01-24 LAB
ANION GAP SERPL CALCULATED.3IONS-SCNC: 10 MMOL/L (ref 5–18)
BNP SERPL-MCNC: 94 PG/ML (ref 0–167)
BUN SERPL-MCNC: 14 MG/DL (ref 8–28)
CALCIUM SERPL-MCNC: 8.8 MG/DL (ref 8.5–10.5)
CHLORIDE BLD-SCNC: 102 MMOL/L (ref 98–107)
CO2 SERPL-SCNC: 26 MMOL/L (ref 22–31)
CREAT SERPL-MCNC: 1.19 MG/DL (ref 0.6–1.1)
ERYTHROCYTE [DISTWIDTH] IN BLOOD BY AUTOMATED COUNT: 16.3 % (ref 10–15)
GFR SERPL CREATININE-BSD FRML MDRD: 44 ML/MIN/1.73M2
GLUCOSE BLD-MCNC: 83 MG/DL (ref 70–125)
HCT VFR BLD AUTO: 29.6 % (ref 35–47)
HGB BLD-MCNC: 9.1 G/DL (ref 11.7–15.7)
MCH RBC QN AUTO: 27.2 PG (ref 26.5–33)
MCHC RBC AUTO-ENTMCNC: 30.7 G/DL (ref 31.5–36.5)
MCV RBC AUTO: 88 FL (ref 78–100)
PLATELET # BLD AUTO: 220 10E3/UL (ref 150–450)
POTASSIUM BLD-SCNC: 4.4 MMOL/L (ref 3.5–5)
RBC # BLD AUTO: 3.35 10E6/UL (ref 3.8–5.2)
SODIUM SERPL-SCNC: 138 MMOL/L (ref 136–145)
WBC # BLD AUTO: 5.1 10E3/UL (ref 4–11)

## 2022-01-24 PROCEDURE — 83880 ASSAY OF NATRIURETIC PEPTIDE: CPT | Performed by: NURSE PRACTITIONER

## 2022-01-24 PROCEDURE — 80048 BASIC METABOLIC PNL TOTAL CA: CPT | Performed by: NURSE PRACTITIONER

## 2022-01-24 PROCEDURE — P9604 ONE-WAY ALLOW PRORATED TRIP: HCPCS | Performed by: NURSE PRACTITIONER

## 2022-01-24 PROCEDURE — 85027 COMPLETE CBC AUTOMATED: CPT | Performed by: NURSE PRACTITIONER

## 2022-01-24 PROCEDURE — 36415 COLL VENOUS BLD VENIPUNCTURE: CPT | Performed by: NURSE PRACTITIONER

## 2022-01-25 ENCOUNTER — TRANSITIONAL CARE UNIT VISIT (OUTPATIENT)
Dept: GERIATRICS | Facility: CLINIC | Age: 87
End: 2022-01-25
Payer: MEDICARE

## 2022-01-25 VITALS
SYSTOLIC BLOOD PRESSURE: 113 MMHG | RESPIRATION RATE: 14 BRPM | DIASTOLIC BLOOD PRESSURE: 73 MMHG | OXYGEN SATURATION: 92 % | WEIGHT: 156 LBS | BODY MASS INDEX: 28.71 KG/M2 | TEMPERATURE: 97.1 F | HEART RATE: 103 BPM | HEIGHT: 62 IN

## 2022-01-25 DIAGNOSIS — J15.69 PNEUMONIA OF LEFT LOWER LOBE DUE TO OTHER AEROBIC GRAM-NEGATIVE BACTERIA (H): ICD-10-CM

## 2022-01-25 DIAGNOSIS — R13.10 DYSPHAGIA, UNSPECIFIED TYPE: ICD-10-CM

## 2022-01-25 DIAGNOSIS — I50.33 ACUTE ON CHRONIC DIASTOLIC CONGESTIVE HEART FAILURE (H): ICD-10-CM

## 2022-01-25 DIAGNOSIS — K21.00 GASTROESOPHAGEAL REFLUX DISEASE WITH ESOPHAGITIS WITHOUT HEMORRHAGE: ICD-10-CM

## 2022-01-25 DIAGNOSIS — I48.0 PAROXYSMAL ATRIAL FIBRILLATION (H): ICD-10-CM

## 2022-01-25 DIAGNOSIS — I26.99 ACUTE PULMONARY EMBOLISM, UNSPECIFIED PULMONARY EMBOLISM TYPE, UNSPECIFIED WHETHER ACUTE COR PULMONALE PRESENT (H): Primary | ICD-10-CM

## 2022-01-25 DIAGNOSIS — N18.30 STAGE 3 CHRONIC KIDNEY DISEASE, UNSPECIFIED WHETHER STAGE 3A OR 3B CKD (H): ICD-10-CM

## 2022-01-25 DIAGNOSIS — J96.21 ACUTE AND CHRONIC RESPIRATORY FAILURE WITH HYPOXIA (H): ICD-10-CM

## 2022-01-25 PROCEDURE — 99309 SBSQ NF CARE MODERATE MDM 30: CPT | Performed by: NURSE PRACTITIONER

## 2022-01-25 ASSESSMENT — MIFFLIN-ST. JEOR: SCORE: 1100.86

## 2022-01-26 NOTE — PROGRESS NOTES
[facility] GERIATRIC SERVICES    Code Status:  FULL CODE   Visit Type:   Chief Complaint   Patient presents with     RECHECK     Facility:  [facility] ([facility]) [[id number]]           History of Present Illness: Maranda Downing is a 86 year old female who I am seeing today for follow-up on the TCU.  Patient initially hospitalized at [facility] from 12/7 to 12/10/2021 after a mechanical fall.  Patient rehospitalized on 1/8/2022 secondary to abdominal pain with blood in her stools.  Patient was noted to have hemorrhoids.  Patient was hypotensive and hypoxic.  Upon admit to the hospital she had a potassium of 5.5 with creatinine of 1.62.  INR was 1.3.  She underwent a VQ scan which was consistent with PE in the right lung.  She was initially treated with IV heparin.  Her and her daughter met with the pulmonologist and decided against further imaging and/or anticoagulation.  Esophagram was normal.  Patient had her Driver catheter removed during hospitalization with no further urinary retention.  She continued with sepsis due to undetermined organism and was treated with antibiotics.  Patient with history of left lower lobe pneumonia.  She continues on oxygen at 1 L.  CHF on Lasix.  Hypertension on losartan and Coreg.  Diabetes type 2 with long-term use of insulin.  Stage III chronic kidney disease.    On today's visit patient sitting up in wheelchair.  Patient continues on 0.5 L of oxygen.  She denies any shortness of breath.  Recent pulmonary embolism.  Patient is not on any anticoagulation secondary to recent GI bleed.  CHF.  Compensated with Lasix.  Lower extremity edema greatly improved.  Weight down about 10 pounds since admit.  No cough.  Lung sounds are clear.  Today I discussed taking her off of her oxygen.  Patient is reporting some increased anxiety with shakiness in her hands in the a.m.  She does have a history of anxiety and continues on lorazepam twice daily as needed.  She was given a dose by the  nurse shortly after my visit.  No evidence of bleed.  Hemoglobin stable at 9.2.  Underlying atrial fib.  Rate controlled.  No longer on anticoagulation.  Diabetes mellitus type 2.  Blood sugar satisfactory.  Chronic diarrhea controlled with Imodium.  Patient denies any further nausea or vomiting.  She does continue omeprazole.      Active Ambulatory Problems     Diagnosis Date Noted     Type 2 diabetes mellitus with diabetic chronic kidney disease, unspecified CKD stage, unspecified whether long term insulin use (H) 02/11/2013     Chronic diarrhea 12/07/2021     Anxiety state 01/06/2011     Allergic rhinitis 05/22/2009     AK (actinic keratosis) 01/24/2013     Acquired absence of other left toe(s) (H) 02/12/2020     Abdominal pain 06/16/2014     Coronary artery disease of native artery of native heart with stable angina pectoris (H) 05/20/2009     Cystitis cystica 05/22/2009     Constipation 04/20/2021     Dysesthesia 05/22/2009     Fall 12/07/2021     Fatty liver 01/16/2012     Fibromyalgia 08/02/2012     Gastroesophageal reflux disease without esophagitis 07/28/2011     Genital herpes 03/13/2015     Hammertoe of left foot 02/06/2019     Hypercalcemia 01/24/2013     Hypercholesteremia 12/13/2021     Hypertension 12/13/2021     Hypokalemia 04/20/2021     Hypoxia 12/07/2021     Osteoporosis 05/22/2009     OA (osteoarthritis) of knee 03/14/2013     Nausea & vomiting 06/16/2014     Mild cognitive impairment 06/29/2009     Midline thoracic back pain 06/23/2015     Irritable bowel syndrome 05/22/2009     Overflow diarrhea 03/12/2014     Vitamin B 12 deficiency 05/22/2009     Stage 3 chronic kidney disease, unspecified whether stage 3a or 3b CKD (H) 01/29/2021     Sensorineural hearing loss, bilateral 01/17/2019     Rosacea 05/22/2009     Right bundle branch block 03/20/2014     Pulmonary hypertension (H) 01/29/2021     PPD positive 06/29/2009     Paroxysmal atrial tachycardia (H) 12/07/2021     Noninfectious  gastroenteritis 04/25/2021     Elevated troponin 12/14/2021     Diverticular disease 12/27/2021     Acute on chronic diastolic congestive heart failure (H) 12/14/2021     Urinary retention 01/03/2022     Acute cystitis without hematuria 01/03/2022     History of aspiration pneumonia 01/03/2022     Paroxysmal atrial fibrillation (H) 01/03/2022     Acute and chronic respiratory failure with hypoxia (H) 01/03/2022     Failure to attend appointment 01/03/2022     Pneumonia of left lower lobe due to other aerobic gram-negative bacteria (H) 01/06/2022     Sepsis due to undetermined organism (H) 01/08/2022     Fresh blood passed per rectum 01/08/2022     Bronchiectasis with acute lower respiratory infection (H) 01/08/2022     Abnormal CT scan of lung 01/09/2022     Meconium aspiration pneumonia, unspecified laterality, unspecified part of lung 01/17/2022     Dysphagia, unspecified type 01/17/2022     Acute pulmonary embolism, unspecified pulmonary embolism type, unspecified whether acute cor pulmonale present (H) 01/17/2022     Gastroesophageal reflux disease with esophagitis without hemorrhage 01/17/2022     Resolved Ambulatory Problems     Diagnosis Date Noted     No Resolved Ambulatory Problems     No Additional Past Medical History       Current Outpatient Medications:      acetaminophen (TYLENOL) 325 MG tablet, Take 325 mg by mouth every 4 hours as needed for mild pain, Disp: , Rfl:      albuterol (PROVENTIL) (2.5 MG/3ML) 0.083% neb solution, Take 2.5 mg by nebulization 4 times daily as needed for shortness of breath / dyspnea or wheezing Also give scheduled 3 times daily, Disp: , Rfl:      fluticasone (FLONASE) 50 MCG/ACT nasal spray, 2 sprays daily, Disp: , Rfl:      furosemide (LASIX) 20 MG tablet, Take 20 mg by mouth daily, Disp: , Rfl:      lactobacillus rhamnosus, GG, (CULTURELL) capsule, Take 1 capsule by mouth 2 times daily, Disp: , Rfl:      loperamide (IMODIUM) 2 MG capsule, Take 2 mg by mouth 3 times daily  "as needed for diarrhea, Disp: , Rfl:      LORazepam (ATIVAN) 1 MG tablet, Take 1 mg by mouth 2 times daily as needed for anxiety, Disp: , Rfl:      losartan (COZAAR) 25 MG tablet, Take 25 mg by mouth 2 times daily, Disp: , Rfl:      omeprazole (PRILOSEC OTC) 20 MG EC tablet, Take 20 mg by mouth 2 times daily , Disp: , Rfl:      ondansetron (ZOFRAN) 4 MG tablet, Take by mouth 2 times daily Give 30 minutes prior to meals and meds., Disp: , Rfl:      potassium chloride ER (K-TAB) 20 MEQ CR tablet, Take 20 mEq by mouth 2 times daily, Disp: , Rfl:      psyllium (METAMUCIL/KONSYL) Packet, Take 1 packet by mouth daily, Disp: , Rfl:      sertraline (ZOLOFT) 50 MG tablet, Take 50 mg by mouth daily, Disp: , Rfl:      simvastatin (ZOCOR) 20 MG tablet, Take 20 mg by mouth At Bedtime, Disp: , Rfl:   Allergies   Allergen Reactions     Codeine Nausea     Hydrocodone      Other reaction(s): GI Upset  nausea     Levofloxacin      Other reaction(s): Intolerance-Can't Take  Made leg pain worse.     Lisinopril Cough     Morphine Nausea and Vomiting     Other reaction(s): Nausea/Vomiting     Sulfa Drugs Nausea     Penicillins Rash       All Meds and Allergies reviewed in the record at the facility and is the most up-to-date    REVIEW OF SYSTEMS:   Review of Systems  No fevers or chills. No headache, lightheadedness or dizziness.  SOB improving, she continues on oxygen at 1 L, no chest pains or palpitations. Appetite is fair. Chronic diarrhea on as needed Imodium.  Nausea greatly improved with Zofran.  No further vomiting.  Patient recently started on omeprazole.  + Dysphagia.  No further urinary retention.  Otherwise review of systems are negative.     PHYSICAL EXAMINATION:  Physical Exam     Vital signs: /73   Pulse 103   Temp 97.1  F (36.2  C)   Resp 14   Ht 1.575 m (5' 2\")   Wt 70.8 kg (156 lb)   SpO2 92%   BMI 28.53 kg/m    General: Awake, Alert, oriented x1, appropriately, follows simple commands, " conversant  HEENT:Pink conjunctiva, anicteric sclerae, moist oral mucosa  NECK: Supple, without any lymphadenopathy, or masses  CVS:  S1  S2, without murmur or gallop.   LUNG: No wheezes rales or rhonchi.  Continues on oxygen 1 L.   BACK: No kyphosis of the thoracic spine  ABDOMEN: Soft, nontender to palpation, with positive bowel sounds  : Driver catheter draining clear yellow urine.  EXTREMITIES: Moves both upper and lower extremities with generalized weakness, 2-3+pedal edema, no calf tenderness  SKIN: Warm and dry, no rashes or erythema noted  NEUROLOGIC: Intact, pulses palpable  PSYCHIATRIC: Mild cognitive impairment noted.      Labs:  All labs reviewed in the nursing home record and Epic   @  Lab Results   Component Value Date    WBC 11.7 12/14/2021     Lab Results   Component Value Date    RBC 3.93 12/14/2021     Lab Results   Component Value Date    HGB 10.7 12/14/2021     Lab Results   Component Value Date    HCT 33.2 12/14/2021     Lab Results   Component Value Date    MCV 85 12/14/2021     Lab Results   Component Value Date    MCH 27.2 12/14/2021     Lab Results   Component Value Date    MCHC 32.2 12/14/2021     Lab Results   Component Value Date    RDW 13.6 12/14/2021     Lab Results   Component Value Date     12/14/2021        @Last Comprehensive Metabolic Panel:  Sodium   Date Value Ref Range Status   01/24/2022 138 136 - 145 mmol/L Final     Potassium   Date Value Ref Range Status   01/24/2022 4.4 3.5 - 5.0 mmol/L Final     Chloride   Date Value Ref Range Status   01/24/2022 102 98 - 107 mmol/L Final     Carbon Dioxide (CO2)   Date Value Ref Range Status   01/24/2022 26 22 - 31 mmol/L Final     Anion Gap   Date Value Ref Range Status   01/24/2022 10 5 - 18 mmol/L Final     Glucose   Date Value Ref Range Status   01/24/2022 83 70 - 125 mg/dL Final     Urea Nitrogen   Date Value Ref Range Status   01/24/2022 14 8 - 28 mg/dL Final     Creatinine   Date Value Ref Range Status   01/24/2022 1.19 (H)  0.60 - 1.10 mg/dL Final     GFR Estimate   Date Value Ref Range Status   01/24/2022 44 (L) >60 mL/min/1.73m2 Final     Comment:     Effective December 21, 2021 eGFRcr in adults is calculated using the 2021 CKD-EPI creatinine equation which includes age and gender (Soumya boggs al., NE, DOI: 10.1056/OUFGsh5512436)     Calcium   Date Value Ref Range Status   01/24/2022 8.8 8.5 - 10.5 mg/dL Final         Assessment/Plan:    ICD-10-CM    1. Acute pulmonary embolism, unspecified pulmonary embolism type, unspecified whether acute cor pulmonale present (H)  I26.99  Currently not anticoagulated secondary to recent GI bleed.  Denies any chest pain.  Denies any shortness of breath.  O2 on at 0.5 L.  Remove oxygen today and monitor O2 sats.   2. Pneumonia of left lower lobe due to other aerobic gram-negative bacteria (H)  J15.6  Patient has completed her oral antibiotic.   3. Acute on chronic diastolic congestive heart failure (H)  I50.33 lower extremity edema 2+.  Continues on Lasix.  Okay for lymphedema therapy eval and treat.  Continue daily weights.  Patient down about 10 pounds since admit.   4. Dysphagia, unspecified type  R13.10  Continues on speech therapy.  Nausea improved with Zofran and scheduled omeprazole.  No further emesis.   5. Stage 3 chronic kidney disease, unspecified whether stage 3a or 3b CKD (H)  N18.30  Continue to monitor.   Follow-up CBC and BMP unremarkable.   6. Gastroesophageal reflux disease with esophagitis without hemorrhage  K21.00  continue PPI.   7. Paroxysmal atrial fibrillation (H)  I48.0  No longer on anticoagulation.   8. Acute and chronic respiratory failure with hypoxia (H)  J96.21  Attempt to wean off oxygen.  Continues at  0.5 L.     10.   GERD  Continue   omeprazole 20 mg p.o. twice daily.         This note has been dictated using voice recognition software. Any grammatical or context distortions are unintentional and inherent to the software    Electronically signed by: Teresa Villa  CNP

## 2022-01-27 ENCOUNTER — TRANSITIONAL CARE UNIT VISIT (OUTPATIENT)
Dept: GERIATRICS | Facility: CLINIC | Age: 87
End: 2022-01-27
Payer: MEDICARE

## 2022-01-27 VITALS
HEIGHT: 62 IN | HEART RATE: 105 BPM | OXYGEN SATURATION: 92 % | SYSTOLIC BLOOD PRESSURE: 105 MMHG | WEIGHT: 159 LBS | DIASTOLIC BLOOD PRESSURE: 68 MMHG | BODY MASS INDEX: 29.26 KG/M2 | RESPIRATION RATE: 16 BRPM | TEMPERATURE: 98 F

## 2022-01-27 DIAGNOSIS — K21.00 GASTROESOPHAGEAL REFLUX DISEASE WITH ESOPHAGITIS WITHOUT HEMORRHAGE: ICD-10-CM

## 2022-01-27 DIAGNOSIS — N18.30 STAGE 3 CHRONIC KIDNEY DISEASE, UNSPECIFIED WHETHER STAGE 3A OR 3B CKD (H): ICD-10-CM

## 2022-01-27 DIAGNOSIS — I50.33 ACUTE ON CHRONIC DIASTOLIC CONGESTIVE HEART FAILURE (H): ICD-10-CM

## 2022-01-27 DIAGNOSIS — I26.99 ACUTE PULMONARY EMBOLISM, UNSPECIFIED PULMONARY EMBOLISM TYPE, UNSPECIFIED WHETHER ACUTE COR PULMONALE PRESENT (H): Primary | ICD-10-CM

## 2022-01-27 DIAGNOSIS — J96.21 ACUTE AND CHRONIC RESPIRATORY FAILURE WITH HYPOXIA (H): ICD-10-CM

## 2022-01-27 DIAGNOSIS — J15.69 PNEUMONIA OF LEFT LOWER LOBE DUE TO OTHER AEROBIC GRAM-NEGATIVE BACTERIA (H): ICD-10-CM

## 2022-01-27 DIAGNOSIS — R13.10 DYSPHAGIA, UNSPECIFIED TYPE: ICD-10-CM

## 2022-01-27 DIAGNOSIS — I48.0 PAROXYSMAL ATRIAL FIBRILLATION (H): ICD-10-CM

## 2022-01-27 PROCEDURE — 99309 SBSQ NF CARE MODERATE MDM 30: CPT | Performed by: NURSE PRACTITIONER

## 2022-01-27 ASSESSMENT — MIFFLIN-ST. JEOR: SCORE: 1114.47

## 2022-01-27 NOTE — LETTER
1/27/2022        RE: Maranda Downing  [address]  [place] [address]        [facility]    Code Status:  FULL CODE   Visit Type:   Chief Complaint   Patient presents with     Nursing Home Acute     TCU Follow up     Facility:  [facility] [facility] ([facility]) [[id number]]           History of Present Illness: Maranda Downing is a 86 year old female who I am seeing today for follow-up on the TCU.  Patient initially hospitalized at [facility] from 12/7 to 12/10/2021 after a mechanical fall.  Patient rehospitalized on 1/8/2022 secondary to abdominal pain with blood in her stools.  Patient was noted to have hemorrhoids.  Patient was hypotensive and hypoxic.  Upon admit to the hospital she had a potassium of 5.5 with creatinine of 1.62.  INR was 1.3.  She underwent a VQ scan which was consistent with PE in the right lung.  She was initially treated with IV heparin.  Her and her daughter met with the pulmonologist and decided against further imaging and/or anticoagulation.  Esophagram was normal.  Patient had her Driver catheter removed during hospitalization with no further urinary retention.  She continued with sepsis due to undetermined organism and was treated with antibiotics.  Patient with history of left lower lobe pneumonia.  She continues on oxygen at 1 L.  CHF on Lasix.  Hypertension on losartan and Coreg.  Diabetes type 2 with long-term use of insulin.  Stage III chronic kidney disease.    On today's visit patient sitting up in wheelchair.  Her daughter is present on exam.  Patient with recent pulmonary embolism and left lower lobe pneumonia.  She has been weaned off her oxygen.  O2 sats in the mid to upper 90s.  She denies any shortness of breath or chest pain on today's visit.  CHF.  Compensated with Lasix.  She does have chronic lower extremity edema.  Today increased left lower extremity edema.  Pulses are palpable however she reported not being able to feel her left lower  extremity earlier today.  She is no longer on anticoagulation secondary to recent GI bleed.  She continues on omeprazole.  Hemoglobin stable at 9.2.  She also has underlying pulmonary embolism.  She has risk for blood clot.  We will follow up with Doppler.  Underlying anxiety.  She continues on as needed lorazepam. Underlying atrial fib.  Rate controlled.  No longer on anticoagulation.Diabetes mellitus type 2.  Blood sugar satisfactory.  Chronic diarrhea controlled with Imodium.      Active Ambulatory Problems     Diagnosis Date Noted     Type 2 diabetes mellitus with diabetic chronic kidney disease, unspecified CKD stage, unspecified whether long term insulin use (H) 02/11/2013     Chronic diarrhea 12/07/2021     Anxiety state 01/06/2011     Allergic rhinitis 05/22/2009     AK (actinic keratosis) 01/24/2013     Acquired absence of other left toe(s) (H) 02/12/2020     Abdominal pain 06/16/2014     Coronary artery disease of native artery of native heart with stable angina pectoris (H) 05/20/2009     Cystitis cystica 05/22/2009     Constipation 04/20/2021     Dysesthesia 05/22/2009     Fall 12/07/2021     Fatty liver 01/16/2012     Fibromyalgia 08/02/2012     Gastroesophageal reflux disease without esophagitis 07/28/2011     Genital herpes 03/13/2015     Hammertoe of left foot 02/06/2019     Hypercalcemia 01/24/2013     Hypercholesteremia 12/13/2021     Hypertension 12/13/2021     Hypokalemia 04/20/2021     Hypoxia 12/07/2021     Osteoporosis 05/22/2009     OA (osteoarthritis) of knee 03/14/2013     Nausea & vomiting 06/16/2014     Mild cognitive impairment 06/29/2009     Midline thoracic back pain 06/23/2015     Irritable bowel syndrome 05/22/2009     Overflow diarrhea 03/12/2014     Vitamin B 12 deficiency 05/22/2009     Stage 3 chronic kidney disease, unspecified whether stage 3a or 3b CKD (H) 01/29/2021     Sensorineural hearing loss, bilateral 01/17/2019     Rosacea 05/22/2009     Right bundle branch block  03/20/2014     Pulmonary hypertension (H) 01/29/2021     PPD positive 06/29/2009     Paroxysmal atrial tachycardia (H) 12/07/2021     Noninfectious gastroenteritis 04/25/2021     Elevated troponin 12/14/2021     Diverticular disease 12/27/2021     Acute on chronic diastolic congestive heart failure (H) 12/14/2021     Urinary retention 01/03/2022     Acute cystitis without hematuria 01/03/2022     History of aspiration pneumonia 01/03/2022     Paroxysmal atrial fibrillation (H) 01/03/2022     Acute and chronic respiratory failure with hypoxia (H) 01/03/2022     Failure to attend appointment 01/03/2022     Pneumonia of left lower lobe due to other aerobic gram-negative bacteria (H) 01/06/2022     Sepsis due to undetermined organism (H) 01/08/2022     Fresh blood passed per rectum 01/08/2022     Bronchiectasis with acute lower respiratory infection (H) 01/08/2022     Abnormal CT scan of lung 01/09/2022     Meconium aspiration pneumonia, unspecified laterality, unspecified part of lung 01/17/2022     Dysphagia, unspecified type 01/17/2022     Acute pulmonary embolism, unspecified pulmonary embolism type, unspecified whether acute cor pulmonale present (H) 01/17/2022     Gastroesophageal reflux disease with esophagitis without hemorrhage 01/17/2022     Resolved Ambulatory Problems     Diagnosis Date Noted     No Resolved Ambulatory Problems     No Additional Past Medical History       Current Outpatient Medications:      acetaminophen (TYLENOL) 325 MG tablet, Take 325 mg by mouth every 4 hours as needed for mild pain, Disp: , Rfl:      albuterol (PROVENTIL) (2.5 MG/3ML) 0.083% neb solution, Take 2.5 mg by nebulization 4 times daily as needed for shortness of breath / dyspnea or wheezing Also give scheduled 3 times daily, Disp: , Rfl:      fluticasone (FLONASE) 50 MCG/ACT nasal spray, 2 sprays daily, Disp: , Rfl:      furosemide (LASIX) 20 MG tablet, Take 20 mg by mouth daily, Disp: , Rfl:      lactobacillus rhamnosus,  "GG, (CULTURELL) capsule, Take 1 capsule by mouth 2 times daily, Disp: , Rfl:      loperamide (IMODIUM) 2 MG capsule, Take 2 mg by mouth 3 times daily as needed for diarrhea, Disp: , Rfl:      LORazepam (ATIVAN) 1 MG tablet, Take 1 mg by mouth 2 times daily as needed for anxiety, Disp: , Rfl:      losartan (COZAAR) 25 MG tablet, Take 25 mg by mouth 2 times daily, Disp: , Rfl:      omeprazole (PRILOSEC OTC) 20 MG EC tablet, Take 20 mg by mouth 2 times daily , Disp: , Rfl:      ondansetron (ZOFRAN) 4 MG tablet, Take by mouth 2 times daily Give 30 minutes prior to meals and meds., Disp: , Rfl:      potassium chloride ER (K-TAB) 20 MEQ CR tablet, Take 20 mEq by mouth 2 times daily, Disp: , Rfl:      psyllium (METAMUCIL/KONSYL) Packet, Take 1 packet by mouth daily, Disp: , Rfl:      sertraline (ZOLOFT) 50 MG tablet, Take 50 mg by mouth daily, Disp: , Rfl:      simvastatin (ZOCOR) 20 MG tablet, Take 20 mg by mouth At Bedtime, Disp: , Rfl:   Allergies   Allergen Reactions     Codeine Nausea     Hydrocodone      Other reaction(s): GI Upset  nausea     Levofloxacin      Other reaction(s): Intolerance-Can't Take  Made leg pain worse.     Lisinopril Cough     Morphine Nausea and Vomiting     Other reaction(s): Nausea/Vomiting     Sulfa Drugs Nausea     Penicillins Rash       All Meds and Allergies reviewed in the record at the facility and is the most up-to-date    REVIEW OF SYSTEMS:   Review of Systems  No fevers or chills. No headache, lightheadedness or dizziness.  SOB improving, she continues on oxygen at 1 L, no chest pains or palpitations. Appetite is fair. Chronic diarrhea on as needed Imodium.  Nausea greatly improved with Zofran.  No further vomiting.  Patient recently started on omeprazole.  + Dysphagia.  No further urinary retention.  Otherwise review of systems are negative.     PHYSICAL EXAMINATION:  Physical Exam     Vital signs: /68   Pulse 105   Temp 98  F (36.7  C)   Resp 16   Ht 1.575 m (5' 2\")   Wt " 72.1 kg (159 lb)   SpO2 92%   BMI 29.08 kg/m    General: Awake, Alert, oriented x1, appropriately, follows simple commands, conversant  HEENT:Pink conjunctiva, anicteric sclerae, moist oral mucosa  NECK: Supple, without any lymphadenopathy, or masses  CVS:  S1  S2, without murmur or gallop.   LUNG: No wheezes rales or rhonchi.  Patient off her oxygen.  BACK: No kyphosis of the thoracic spine  ABDOMEN: Soft, nontender to palpation, with positive bowel sounds  : Driver catheter draining clear yellow urine.  EXTREMITIES: Moves both upper and lower extremities with generalized weakness, 2-3+pedal edema greater on the left lower extremity, no calf tenderness  SKIN: Warm and dry, no rashes or erythema noted  NEUROLOGIC: Intact, pulses palpable  PSYCHIATRIC: Mild cognitive impairment noted.      Labs:  All labs reviewed in the nursing home record and Epic   @  Lab Results   Component Value Date    WBC 11.7 12/14/2021     Lab Results   Component Value Date    RBC 3.93 12/14/2021     Lab Results   Component Value Date    HGB 10.7 12/14/2021     Lab Results   Component Value Date    HCT 33.2 12/14/2021     Lab Results   Component Value Date    MCV 85 12/14/2021     Lab Results   Component Value Date    MCH 27.2 12/14/2021     Lab Results   Component Value Date    MCHC 32.2 12/14/2021     Lab Results   Component Value Date    RDW 13.6 12/14/2021     Lab Results   Component Value Date     12/14/2021        @Last Comprehensive Metabolic Panel:  Sodium   Date Value Ref Range Status   01/24/2022 138 136 - 145 mmol/L Final     Potassium   Date Value Ref Range Status   01/24/2022 4.4 3.5 - 5.0 mmol/L Final     Chloride   Date Value Ref Range Status   01/24/2022 102 98 - 107 mmol/L Final     Carbon Dioxide (CO2)   Date Value Ref Range Status   01/24/2022 26 22 - 31 mmol/L Final     Anion Gap   Date Value Ref Range Status   01/24/2022 10 5 - 18 mmol/L Final     Glucose   Date Value Ref Range Status   01/24/2022 83 70 - 125  mg/dL Final     Urea Nitrogen   Date Value Ref Range Status   01/24/2022 14 8 - 28 mg/dL Final     Creatinine   Date Value Ref Range Status   01/24/2022 1.19 (H) 0.60 - 1.10 mg/dL Final     GFR Estimate   Date Value Ref Range Status   01/24/2022 44 (L) >60 mL/min/1.73m2 Final     Comment:     Effective December 21, 2021 eGFRcr in adults is calculated using the 2021 CKD-EPI creatinine equation which includes age and gender (Soumya et al., [place], DOI: 10.[facility]6/[id number])     Calcium   Date Value Ref Range Status   01/24/2022 8.8 8.5 - 10.5 mg/dL Final         Assessment/Plan:    ICD-10-CM    1. Acute pulmonary embolism, unspecified pulmonary embolism type, unspecified whether acute cor pulmonale present (H)  I26.99  Currently not anticoagulated secondary to recent GI bleed.  Denies any chest pain.  Denies any shortness of breath.    Patient has been weaned off her oxygen.  No hypoxia.  Increased lower extremity edema greater on the left.  Obtain venous Doppler to rule out DVT.   2. Pneumonia of left lower lobe due to other aerobic gram-negative bacteria (H)  J15.6  Patient has completed her oral antibiotic.   3. Acute on chronic diastolic congestive heart failure (H)  I50.33 lower extremity edema 2+.  Continues on Lasix.  Okay for lymphedema therapy eval and treat.  Continue daily weights.  Patient down about 10 pounds since admit.   4. Dysphagia, unspecified type  R13.10  Continues on speech therapy.  Nausea improved with Zofran and scheduled omeprazole.  No further emesis.   5. Stage 3 chronic kidney disease, unspecified whether stage 3a or 3b CKD (H)  N18.30  Continue to monitor.   Follow-up CBC and BMP unremarkable.   6. Gastroesophageal reflux disease with esophagitis without hemorrhage  K21.00  continue PPI.   7. Paroxysmal atrial fibrillation (H)  I48.0  No longer on anticoagulation.   8. Acute and chronic respiratory failure with hypoxia (H)  J96.21  Attempt to wean off oxygen.  Continues at  0.5 L.     10.    GERD  Continue   omeprazole 20 mg p.o. twice daily.         This note has been dictated using voice recognition software. Any grammatical or context distortions are unintentional and inherent to the software    Electronically signed by: Teresa Villa CNP           Sincerely,        Teresa Villa NP

## 2022-01-28 ENCOUNTER — LAB REQUISITION (OUTPATIENT)
Dept: LAB | Facility: CLINIC | Age: 87
End: 2022-01-28
Payer: MEDICARE

## 2022-01-28 DIAGNOSIS — I50.23 ACUTE ON CHRONIC SYSTOLIC (CONGESTIVE) HEART FAILURE (H): ICD-10-CM

## 2022-01-28 NOTE — PROGRESS NOTES
[facility] GERIATRIC SERVICES    Code Status:  FULL CODE   Visit Type:   Chief Complaint   Patient presents with     Nursing Home Acute     TCU Follow up     Facility:  [facility] () [[id number]]           History of Present Illness: Maranda Downing is a 86 year old female who I am seeing today for follow-up on the TCU.  Patient initially hospitalized at [facility] from 12/7 to 12/10/2021 after a mechanical fall.  Patient rehospitalized on 1/8/2022 secondary to abdominal pain with blood in her stools.  Patient was noted to have hemorrhoids.  Patient was hypotensive and hypoxic.  Upon admit to the hospital she had a potassium of 5.5 with creatinine of 1.62.  INR was 1.3.  She underwent a VQ scan which was consistent with PE in the right lung.  She was initially treated with IV heparin.  Her and her daughter met with the pulmonologist and decided against further imaging and/or anticoagulation.  Esophagram was normal.  Patient had her Driver catheter removed during hospitalization with no further urinary retention.  She continued with sepsis due to undetermined organism and was treated with antibiotics.  Patient with history of left lower lobe pneumonia.  She continues on oxygen at 1 L.  CHF on Lasix.  Hypertension on losartan and Coreg.  Diabetes type 2 with long-term use of insulin.  Stage III chronic kidney disease.    On today's visit patient sitting up in wheelchair.  Her daughter is present on exam.  Patient with recent pulmonary embolism and left lower lobe pneumonia.  She has been weaned off her oxygen.  O2 sats in the mid to upper 90s.  She denies any shortness of breath or chest pain on today's visit.  CHF.  Compensated with Lasix.  She does have chronic lower extremity edema.  Today increased left lower extremity edema.  Pulses are palpable however she reported not being able to feel her left lower extremity earlier today.  She is no longer on anticoagulation secondary to recent GI bleed.  She  continues on omeprazole.  Hemoglobin stable at 9.2.  She also has underlying pulmonary embolism.  She has risk for blood clot.  We will follow up with Doppler.  Underlying anxiety.  She continues on as needed lorazepam. Underlying atrial fib.  Rate controlled.  No longer on anticoagulation.Diabetes mellitus type 2.  Blood sugar satisfactory.  Chronic diarrhea controlled with Imodium.      Active Ambulatory Problems     Diagnosis Date Noted     Type 2 diabetes mellitus with diabetic chronic kidney disease, unspecified CKD stage, unspecified whether long term insulin use (H) 02/11/2013     Chronic diarrhea 12/07/2021     Anxiety state 01/06/2011     Allergic rhinitis 05/22/2009     AK (actinic keratosis) 01/24/2013     Acquired absence of other left toe(s) (H) 02/12/2020     Abdominal pain 06/16/2014     Coronary artery disease of native artery of native heart with stable angina pectoris (H) 05/20/2009     Cystitis cystica 05/22/2009     Constipation 04/20/2021     Dysesthesia 05/22/2009     Fall 12/07/2021     Fatty liver 01/16/2012     Fibromyalgia 08/02/2012     Gastroesophageal reflux disease without esophagitis 07/28/2011     Genital herpes 03/13/2015     Hammertoe of left foot 02/06/2019     Hypercalcemia 01/24/2013     Hypercholesteremia 12/13/2021     Hypertension 12/13/2021     Hypokalemia 04/20/2021     Hypoxia 12/07/2021     Osteoporosis 05/22/2009     OA (osteoarthritis) of knee 03/14/2013     Nausea & vomiting 06/16/2014     Mild cognitive impairment 06/29/2009     Midline thoracic back pain 06/23/2015     Irritable bowel syndrome 05/22/2009     Overflow diarrhea 03/12/2014     Vitamin B 12 deficiency 05/22/2009     Stage 3 chronic kidney disease, unspecified whether stage 3a or 3b CKD (H) 01/29/2021     Sensorineural hearing loss, bilateral 01/17/2019     Rosacea 05/22/2009     Right bundle branch block 03/20/2014     Pulmonary hypertension (H) 01/29/2021     PPD positive 06/29/2009     Paroxysmal atrial  tachycardia (H) 12/07/2021     Noninfectious gastroenteritis 04/25/2021     Elevated troponin 12/14/2021     Diverticular disease 12/27/2021     Acute on chronic diastolic congestive heart failure (H) 12/14/2021     Urinary retention 01/03/2022     Acute cystitis without hematuria 01/03/2022     History of aspiration pneumonia 01/03/2022     Paroxysmal atrial fibrillation (H) 01/03/2022     Acute and chronic respiratory failure with hypoxia (H) 01/03/2022     Failure to attend appointment 01/03/2022     Pneumonia of left lower lobe due to other aerobic gram-negative bacteria (H) 01/06/2022     Sepsis due to undetermined organism (H) 01/08/2022     Fresh blood passed per rectum 01/08/2022     Bronchiectasis with acute lower respiratory infection (H) 01/08/2022     Abnormal CT scan of lung 01/09/2022     Meconium aspiration pneumonia, unspecified laterality, unspecified part of lung 01/17/2022     Dysphagia, unspecified type 01/17/2022     Acute pulmonary embolism, unspecified pulmonary embolism type, unspecified whether acute cor pulmonale present (H) 01/17/2022     Gastroesophageal reflux disease with esophagitis without hemorrhage 01/17/2022     Resolved Ambulatory Problems     Diagnosis Date Noted     No Resolved Ambulatory Problems     No Additional Past Medical History       Current Outpatient Medications:      acetaminophen (TYLENOL) 325 MG tablet, Take 325 mg by mouth every 4 hours as needed for mild pain, Disp: , Rfl:      albuterol (PROVENTIL) (2.5 MG/3ML) 0.083% neb solution, Take 2.5 mg by nebulization 4 times daily as needed for shortness of breath / dyspnea or wheezing Also give scheduled 3 times daily, Disp: , Rfl:      fluticasone (FLONASE) 50 MCG/ACT nasal spray, 2 sprays daily, Disp: , Rfl:      furosemide (LASIX) 20 MG tablet, Take 20 mg by mouth daily, Disp: , Rfl:      lactobacillus rhamnosus, GG, (CULTURELL) capsule, Take 1 capsule by mouth 2 times daily, Disp: , Rfl:      loperamide (IMODIUM) 2  "MG capsule, Take 2 mg by mouth 3 times daily as needed for diarrhea, Disp: , Rfl:      LORazepam (ATIVAN) 1 MG tablet, Take 1 mg by mouth 2 times daily as needed for anxiety, Disp: , Rfl:      losartan (COZAAR) 25 MG tablet, Take 25 mg by mouth 2 times daily, Disp: , Rfl:      omeprazole (PRILOSEC OTC) 20 MG EC tablet, Take 20 mg by mouth 2 times daily , Disp: , Rfl:      ondansetron (ZOFRAN) 4 MG tablet, Take by mouth 2 times daily Give 30 minutes prior to meals and meds., Disp: , Rfl:      potassium chloride ER (K-TAB) 20 MEQ CR tablet, Take 20 mEq by mouth 2 times daily, Disp: , Rfl:      psyllium (METAMUCIL/KONSYL) Packet, Take 1 packet by mouth daily, Disp: , Rfl:      sertraline (ZOLOFT) 50 MG tablet, Take 50 mg by mouth daily, Disp: , Rfl:      simvastatin (ZOCOR) 20 MG tablet, Take 20 mg by mouth At Bedtime, Disp: , Rfl:   Allergies   Allergen Reactions     Codeine Nausea     Hydrocodone      Other reaction(s): GI Upset  nausea     Levofloxacin      Other reaction(s): Intolerance-Can't Take  Made leg pain worse.     Lisinopril Cough     Morphine Nausea and Vomiting     Other reaction(s): Nausea/Vomiting     Sulfa Drugs Nausea     Penicillins Rash       All Meds and Allergies reviewed in the record at the facility and is the most up-to-date    REVIEW OF SYSTEMS:   Review of Systems  No fevers or chills. No headache, lightheadedness or dizziness.  SOB improving, she continues on oxygen at 1 L, no chest pains or palpitations. Appetite is fair. Chronic diarrhea on as needed Imodium.  Nausea greatly improved with Zofran.  No further vomiting.  Patient recently started on omeprazole.  + Dysphagia.  No further urinary retention.  Otherwise review of systems are negative.     PHYSICAL EXAMINATION:  Physical Exam     Vital signs: /68   Pulse 105   Temp 98  F (36.7  C)   Resp 16   Ht 1.575 m (5' 2\")   Wt 72.1 kg (159 lb)   SpO2 92%   BMI 29.08 kg/m    General: Awake, Alert, oriented x1, appropriately, " follows simple commands, conversant  HEENT:Pink conjunctiva, anicteric sclerae, moist oral mucosa  NECK: Supple, without any lymphadenopathy, or masses  CVS:  S1  S2, without murmur or gallop.   LUNG: No wheezes rales or rhonchi.  Patient off her oxygen.  BACK: No kyphosis of the thoracic spine  ABDOMEN: Soft, nontender to palpation, with positive bowel sounds  : Driver catheter draining clear yellow urine.  EXTREMITIES: Moves both upper and lower extremities with generalized weakness, 2-3+pedal edema greater on the left lower extremity, no calf tenderness  SKIN: Warm and dry, no rashes or erythema noted  NEUROLOGIC: Intact, pulses palpable  PSYCHIATRIC: Mild cognitive impairment noted.      Labs:  All labs reviewed in the nursing home record and [facility]   @  Lab Results   Component Value Date    WBC 11.7 12/14/2021     Lab Results   Component Value Date    RBC 3.93 12/14/2021     Lab Results   Component Value Date    HGB 10.7 12/14/2021     Lab Results   Component Value Date    HCT 33.2 12/14/2021     Lab Results   Component Value Date    MCV 85 12/14/2021     Lab Results   Component Value Date    MCH 27.2 12/14/2021     Lab Results   Component Value Date    MCHC 32.2 12/14/2021     Lab Results   Component Value Date    RDW 13.6 12/14/2021     Lab Results   Component Value Date     12/14/2021        @Last Comprehensive Metabolic Panel:  Sodium   Date Value Ref Range Status   01/24/2022 138 136 - 145 mmol/L Final     Potassium   Date Value Ref Range Status   01/24/2022 4.4 3.5 - 5.0 mmol/L Final     Chloride   Date Value Ref Range Status   01/24/2022 102 98 - 107 mmol/L Final     Carbon Dioxide (CO2)   Date Value Ref Range Status   01/24/2022 26 22 - 31 mmol/L Final     Anion Gap   Date Value Ref Range Status   01/24/2022 10 5 - 18 mmol/L Final     Glucose   Date Value Ref Range Status   01/24/2022 83 70 - 125 mg/dL Final     Urea Nitrogen   Date Value Ref Range Status   01/24/2022 14 8 - 28 mg/dL Final      Creatinine   Date Value Ref Range Status   01/24/2022 1.19 (H) 0.60 - 1.10 mg/dL Final     GFR Estimate   Date Value Ref Range Status   01/24/2022 44 (L) >60 mL/min/1.73m2 Final     Comment:     Effective December 21, 2021 eGFRcr in adults is calculated using the 2021 CKD-EPI creatinine equation which includes age and gender (Soumya et al., [place], DOI: 10.1056/MHUVtt5354210)     Calcium   Date Value Ref Range Status   01/24/2022 8.8 8.5 - 10.5 mg/dL Final         Assessment/Plan:    ICD-10-CM    1. Acute pulmonary embolism, unspecified pulmonary embolism type, unspecified whether acute cor pulmonale present (H)  I26.99  Currently not anticoagulated secondary to recent GI bleed.  Denies any chest pain.  Denies any shortness of breath.    Patient has been weaned off her oxygen.  No hypoxia.  Increased lower extremity edema greater on the left.  Obtain venous Doppler to rule out DVT.   2. Pneumonia of left lower lobe due to other aerobic gram-negative bacteria (H)  J15.6  Patient has completed her oral antibiotic.   3. Acute on chronic diastolic congestive heart failure (H)  I50.33 lower extremity edema 2+.  Continues on Lasix.  Okay for lymphedema therapy eval and treat.  Continue daily weights.  Patient down about 10 pounds since admit.   4. Dysphagia, unspecified type  R13.10  Continues on speech therapy.  Nausea improved with Zofran and scheduled omeprazole.  No further emesis.   5. Stage 3 chronic kidney disease, unspecified whether stage 3a or 3b CKD (H)  N18.30  Continue to monitor.   Follow-up CBC and BMP unremarkable.   6. Gastroesophageal reflux disease with esophagitis without hemorrhage  K21.00  continue PPI.   7. Paroxysmal atrial fibrillation (H)  I48.0  No longer on anticoagulation.   8. Acute and chronic respiratory failure with hypoxia (H)  J96.21  Attempt to wean off oxygen.  Continues at  0.5 L.     10.   GERD  Continue   omeprazole 20 mg p.o. twice daily.         This note has been dictated using  voice recognition software. Any grammatical or context distortions are unintentional and inherent to the software    Electronically signed by: Teresa Villa CNP

## 2022-01-31 ENCOUNTER — TRANSITIONAL CARE UNIT VISIT (OUTPATIENT)
Dept: GERIATRICS | Facility: CLINIC | Age: 87
End: 2022-01-31
Payer: MEDICARE

## 2022-01-31 VITALS
HEIGHT: 62 IN | HEART RATE: 106 BPM | BODY MASS INDEX: 29.24 KG/M2 | TEMPERATURE: 98 F | SYSTOLIC BLOOD PRESSURE: 102 MMHG | DIASTOLIC BLOOD PRESSURE: 67 MMHG | WEIGHT: 158.9 LBS | OXYGEN SATURATION: 96 % | RESPIRATION RATE: 17 BRPM

## 2022-01-31 DIAGNOSIS — K21.00 GASTROESOPHAGEAL REFLUX DISEASE WITH ESOPHAGITIS WITHOUT HEMORRHAGE: ICD-10-CM

## 2022-01-31 DIAGNOSIS — I50.33 ACUTE ON CHRONIC DIASTOLIC CONGESTIVE HEART FAILURE (H): ICD-10-CM

## 2022-01-31 DIAGNOSIS — J15.69 PNEUMONIA OF LEFT LOWER LOBE DUE TO OTHER AEROBIC GRAM-NEGATIVE BACTERIA (H): ICD-10-CM

## 2022-01-31 DIAGNOSIS — I26.99 ACUTE PULMONARY EMBOLISM, UNSPECIFIED PULMONARY EMBOLISM TYPE, UNSPECIFIED WHETHER ACUTE COR PULMONALE PRESENT (H): Primary | ICD-10-CM

## 2022-01-31 DIAGNOSIS — I82.402 ACUTE DEEP VEIN THROMBOSIS (DVT) OF LEFT LOWER EXTREMITY, UNSPECIFIED VEIN (H): ICD-10-CM

## 2022-01-31 LAB — HGB BLD-MCNC: 10.1 G/DL (ref 11.7–15.7)

## 2022-01-31 PROCEDURE — P9604 ONE-WAY ALLOW PRORATED TRIP: HCPCS | Performed by: FAMILY MEDICINE

## 2022-01-31 PROCEDURE — 36415 COLL VENOUS BLD VENIPUNCTURE: CPT | Performed by: FAMILY MEDICINE

## 2022-01-31 PROCEDURE — 85018 HEMOGLOBIN: CPT | Performed by: FAMILY MEDICINE

## 2022-01-31 PROCEDURE — 99309 SBSQ NF CARE MODERATE MDM 30: CPT | Performed by: NURSE PRACTITIONER

## 2022-01-31 ASSESSMENT — MIFFLIN-ST. JEOR: SCORE: 1114.02

## 2022-02-01 NOTE — PROGRESS NOTES
[facility] GERIATRIC SERVICES    Code Status:  FULL CODE   Visit Type:   Chief Complaint   Patient presents with     Nursing Home Acute     TCU Follow up     Facility:  [facility] ([facility]) [[id number]]           History of Present Illness: Maranda Downing is a 86 year old female who I am seeing today for follow-up on the TCU.  Patient initially hospitalized at [facility] from 12/7 to 12/10/2021 after a mechanical fall.  Patient rehospitalized on 1/8/2022 secondary to abdominal pain with blood in her stools.  Patient was noted to have hemorrhoids.  Patient was hypotensive and hypoxic.  Upon admit to the hospital she had a potassium of 5.5 with creatinine of 1.62.  INR was 1.3.  She underwent a VQ scan which was consistent with PE in the right lung.  She was initially treated with IV heparin.  Her and her daughter met with the pulmonologist and decided against further imaging and/or anticoagulation.  Esophagram was normal.  Patient had her Driver catheter removed during hospitalization with no further urinary retention.  She continued with sepsis due to undetermined organism and was treated with antibiotics.  Patient with history of left lower lobe pneumonia.  She continues on oxygen at 1 L.  CHF on Lasix.  Hypertension on losartan and Coreg.  Diabetes type 2 with long-term use of insulin.  Stage III chronic kidney disease.    On today's visit patient sitting up in wheelchair.  Patient with recent pulmonary embolism and left lower lobe pneumonia.  She has been weaned off her oxygen.  She denies any shortness of breath or chest pain on today's visit.  Increased swelling of the left lower extremity late last week.  Venous Doppler was obtained which was positive for DVT.  Patient started on Lovenox twice daily.  She was previously on Eliquis however this was discontinued during a previous hospitalization for GI bleed.  She does continue on omeprazole.  CHF.  Patient continues on Lasix.  Underlying anxiety.  She  continues on as needed lorazepam.  History of atrial fib rate controlled.  As stated previously on Eliquis.  Chronic diarrhea.  She continues on Metamucil and as needed Imodium.  Today we discussed discharge planning.  Patient wants to return home however her daughter feels she is not strong enough to return home.  They are looking into assisted living for a short period of time.  Patient feels she does not have enough money to go there.  We did discuss starting teaching of administering Lovenox in preparation for discharge.    Active Ambulatory Problems     Diagnosis Date Noted     Type 2 diabetes mellitus with diabetic chronic kidney disease, unspecified CKD stage, unspecified whether long term insulin use (H) 02/11/2013     Chronic diarrhea 12/07/2021     Anxiety state 01/06/2011     Allergic rhinitis 05/22/2009     AK (actinic keratosis) 01/24/2013     Acquired absence of other left toe(s) (H) 02/12/2020     Abdominal pain 06/16/2014     Coronary artery disease of native artery of native heart with stable angina pectoris (H) 05/20/2009     Cystitis cystica 05/22/2009     Constipation 04/20/2021     Dysesthesia 05/22/2009     Fall 12/07/2021     Fatty liver 01/16/2012     Fibromyalgia 08/02/2012     Gastroesophageal reflux disease without esophagitis 07/28/2011     Genital herpes 03/13/2015     Hammertoe of left foot 02/06/2019     Hypercalcemia 01/24/2013     Hypercholesteremia 12/13/2021     Hypertension 12/13/2021     Hypokalemia 04/20/2021     Hypoxia 12/07/2021     Osteoporosis 05/22/2009     OA (osteoarthritis) of knee 03/14/2013     Nausea & vomiting 06/16/2014     Mild cognitive impairment 06/29/2009     Midline thoracic back pain 06/23/2015     Irritable bowel syndrome 05/22/2009     Overflow diarrhea 03/12/2014     Vitamin B 12 deficiency 05/22/2009     Stage 3 chronic kidney disease, unspecified whether stage 3a or 3b CKD (H) 01/29/2021     Sensorineural hearing loss, bilateral 01/17/2019     Rosacea  05/22/2009     Right bundle branch block 03/20/2014     Pulmonary hypertension (H) 01/29/2021     PPD positive 06/29/2009     Paroxysmal atrial tachycardia (H) 12/07/2021     Noninfectious gastroenteritis 04/25/2021     Elevated troponin 12/14/2021     Diverticular disease 12/27/2021     Acute on chronic diastolic congestive heart failure (H) 12/14/2021     Urinary retention 01/03/2022     Acute cystitis without hematuria 01/03/2022     History of aspiration pneumonia 01/03/2022     Paroxysmal atrial fibrillation (H) 01/03/2022     Acute and chronic respiratory failure with hypoxia (H) 01/03/2022     Failure to attend appointment 01/03/2022     Pneumonia of left lower lobe due to other aerobic gram-negative bacteria (H) 01/06/2022     Sepsis due to undetermined organism (H) 01/08/2022     Fresh blood passed per rectum 01/08/2022     Bronchiectasis with acute lower respiratory infection (H) 01/08/2022     Abnormal CT scan of lung 01/09/2022     Meconium aspiration pneumonia, unspecified laterality, unspecified part of lung 01/17/2022     Dysphagia, unspecified type 01/17/2022     Acute pulmonary embolism, unspecified pulmonary embolism type, unspecified whether acute cor pulmonale present (H) 01/17/2022     Gastroesophageal reflux disease with esophagitis without hemorrhage 01/17/2022     Resolved Ambulatory Problems     Diagnosis Date Noted     No Resolved Ambulatory Problems     No Additional Past Medical History       Current Outpatient Medications:      acetaminophen (TYLENOL) 325 MG tablet, Take 325 mg by mouth every 4 hours as needed for mild pain, Disp: , Rfl:      albuterol (PROVENTIL) (2.5 MG/3ML) 0.083% neb solution, Take 2.5 mg by nebulization 4 times daily as needed for shortness of breath / dyspnea or wheezing Also give scheduled 3 times daily, Disp: , Rfl:      fluticasone (FLONASE) 50 MCG/ACT nasal spray, 2 sprays daily, Disp: , Rfl:      furosemide (LASIX) 20 MG tablet, Take 20 mg by mouth daily,  Disp: , Rfl:      lactobacillus rhamnosus, GG, (CULTURELL) capsule, Take 1 capsule by mouth 2 times daily, Disp: , Rfl:      loperamide (IMODIUM) 2 MG capsule, Take 2 mg by mouth 3 times daily as needed for diarrhea, Disp: , Rfl:      LORazepam (ATIVAN) 1 MG tablet, Take 1 mg by mouth 2 times daily as needed for anxiety, Disp: , Rfl:      losartan (COZAAR) 25 MG tablet, Take 25 mg by mouth 2 times daily, Disp: , Rfl:      omeprazole (PRILOSEC OTC) 20 MG EC tablet, Take 20 mg by mouth 2 times daily , Disp: , Rfl:      ondansetron (ZOFRAN) 4 MG tablet, Take 4 mg by mouth 2 times daily as needed Give 30 minutes prior to meals and meds. , Disp: , Rfl:      potassium chloride ER (K-TAB) 20 MEQ CR tablet, Take 20 mEq by mouth 2 times daily, Disp: , Rfl:      psyllium (METAMUCIL/KONSYL) Packet, Take 1 packet by mouth daily, Disp: , Rfl:      sertraline (ZOLOFT) 50 MG tablet, Take 50 mg by mouth daily, Disp: , Rfl:      simvastatin (ZOCOR) 20 MG tablet, Take 20 mg by mouth At Bedtime, Disp: , Rfl:   Allergies   Allergen Reactions     Codeine Nausea     Hydrocodone      Other reaction(s): GI Upset  nausea     Levofloxacin      Other reaction(s): Intolerance-Can't Take  Made leg pain worse.     Lisinopril Cough     Morphine Nausea and Vomiting     Other reaction(s): Nausea/Vomiting     Sulfa Drugs Nausea     Penicillins Rash       All Meds and Allergies reviewed in the record at the facility and is the most up-to-date    REVIEW OF SYSTEMS:   Review of Systems  No fevers or chills. No headache, lightheadedness or dizziness.  SOB improved, no chest pains or palpitations. Appetite is improving.. Chronic diarrhea on as needed Imodium.  Nausea greatly improved with Zofran.  No further vomiting.  Patient recently started on omeprazole.  + Dysphagia.  No further urinary retention.  Otherwise review of systems are negative.     PHYSICAL EXAMINATION:  Physical Exam     Vital signs: /67   Pulse 106   Temp 98  F (36.7  C)    "Resp 17   Ht 1.575 m (5' 2\")   Wt 72.1 kg (158 lb 14.4 oz)   SpO2 96%   BMI 29.06 kg/m    General: Awake, Alert, oriented x1, appropriately, follows simple commands, conversant  HEENT:Pink conjunctiva, anicteric sclerae, moist oral mucosa  NECK: Supple, without any lymphadenopathy, or masses  CVS:  S1  S2, without murmur or gallop.   LUNG: No wheezes rales or rhonchi.  Patient off her oxygen.  BACK: No kyphosis of the thoracic spine  ABDOMEN: Soft, nontender to palpation, with positive bowel sounds  : Driver catheter draining clear yellow urine.  EXTREMITIES: Moves both upper and lower extremities with generalized weakness, 2+pedal edema greater on the left lower extremity, no calf tenderness  SKIN: Warm and dry, no rashes or erythema noted  NEUROLOGIC: Intact, pulses palpable  PSYCHIATRIC: Mild cognitive impairment noted.      Labs:  All labs reviewed in the nursing home record and Epic   @  Lab Results   Component Value Date    WBC 11.7 12/14/2021     Lab Results   Component Value Date    RBC 3.93 12/14/2021     Lab Results   Component Value Date    HGB 10.7 12/14/2021     Lab Results   Component Value Date    HCT 33.2 12/14/2021     Lab Results   Component Value Date    MCV 85 12/14/2021     Lab Results   Component Value Date    MCH 27.2 12/14/2021     Lab Results   Component Value Date    MCHC 32.2 12/14/2021     Lab Results   Component Value Date    RDW 13.6 12/14/2021     Lab Results   Component Value Date     12/14/2021        @Last Comprehensive Metabolic Panel:  Sodium   Date Value Ref Range Status   01/24/2022 138 136 - 145 mmol/L Final     Potassium   Date Value Ref Range Status   01/24/2022 4.4 3.5 - 5.0 mmol/L Final     Chloride   Date Value Ref Range Status   01/24/2022 102 98 - 107 mmol/L Final     Carbon Dioxide (CO2)   Date Value Ref Range Status   01/24/2022 26 22 - 31 mmol/L Final     Anion Gap   Date Value Ref Range Status   01/24/2022 10 5 - 18 mmol/L Final     Glucose   Date Value " Ref Range Status   01/24/2022 83 70 - 125 mg/dL Final     Urea Nitrogen   Date Value Ref Range Status   01/24/2022 14 8 - 28 mg/dL Final     Creatinine   Date Value Ref Range Status   01/24/2022 1.19 (H) 0.60 - 1.10 mg/dL Final     GFR Estimate   Date Value Ref Range Status   01/24/2022 44 (L) >60 mL/min/1.73m2 Final     Comment:     Effective December 21, 2021 eGFRcr in adults is calculated using the 2021 CKD-EPI creatinine equation which includes age and gender (Soumya et al., NE, DOI: 10.1056/MFBCvk6650847)     Calcium   Date Value Ref Range Status   01/24/2022 8.8 8.5 - 10.5 mg/dL Final         Assessment/Plan:    ICD-10-CM    1. Acute pulmonary embolism, unspecified pulmonary embolism type, unspecified whether acute cor pulmonale present (H)  I26.99  Currently not anticoagulated secondary to recent GI bleed.   Venous Doppler positive for DVT.  Patient previously on Eliquis however developed GI bleed.  Lovenox started twice daily due to recent DVT.   2. Pneumonia of left lower lobe due to other aerobic gram-negative bacteria (H)  J15.6  Patient has completed her oral antibiotic.  She is now off her oxygen.   3. Acute on chronic diastolic congestive heart failure (H)  I50.33 lower extremity edema 2+.  Continues on Lasix.  Okay for lymphedema therapy eval and treat.  Continue daily weights.  Patient down about 10 pounds since admit.   4.   DVT   patient previously on Eliquis but discontinued secondary to GI bleed.  Recent bilateral pulmonary embolism..  Start Lovenox 40 mg subcu twice daily.   5. Dysphagia, unspecified type  R13.10  Continues on speech therapy.  Nausea improved with Zofran and scheduled omeprazole.  Change Zofran to as needed.  No further emesis.   6. Stage 3 chronic kidney disease, unspecified whether stage 3a or 3b CKD (H)  N18.30  Continue to monitor.   Follow-up CBC and BMP unremarkable.   7. Gastroesophageal reflux disease with esophagitis without hemorrhage  K21.00  continue PPI.   8.  Paroxysmal atrial fibrillation (H)  I48.0  No longer on anticoagulation.     9.   GERD  Continue   omeprazole 20 mg p.o. twice daily.         This note has been dictated using voice recognition software. Any grammatical or context distortions are unintentional and inherent to the software    Electronically signed by: Teresa Villa CNP

## 2022-02-03 ENCOUNTER — TRANSITIONAL CARE UNIT VISIT (OUTPATIENT)
Dept: GERIATRICS | Facility: CLINIC | Age: 87
End: 2022-02-03
Payer: MEDICARE

## 2022-02-03 VITALS
HEART RATE: 90 BPM | BODY MASS INDEX: 28.95 KG/M2 | DIASTOLIC BLOOD PRESSURE: 74 MMHG | WEIGHT: 157.3 LBS | HEIGHT: 62 IN | TEMPERATURE: 96.9 F | OXYGEN SATURATION: 92 % | SYSTOLIC BLOOD PRESSURE: 117 MMHG | RESPIRATION RATE: 18 BRPM

## 2022-02-03 DIAGNOSIS — R13.10 DYSPHAGIA, UNSPECIFIED TYPE: ICD-10-CM

## 2022-02-03 DIAGNOSIS — R33.9 URINARY RETENTION: ICD-10-CM

## 2022-02-03 DIAGNOSIS — I50.33 ACUTE ON CHRONIC DIASTOLIC CONGESTIVE HEART FAILURE (H): ICD-10-CM

## 2022-02-03 DIAGNOSIS — N18.30 STAGE 3 CHRONIC KIDNEY DISEASE, UNSPECIFIED WHETHER STAGE 3A OR 3B CKD (H): ICD-10-CM

## 2022-02-03 DIAGNOSIS — I48.0 PAROXYSMAL ATRIAL FIBRILLATION (H): ICD-10-CM

## 2022-02-03 DIAGNOSIS — K21.00 GASTROESOPHAGEAL REFLUX DISEASE WITH ESOPHAGITIS WITHOUT HEMORRHAGE: ICD-10-CM

## 2022-02-03 DIAGNOSIS — I82.402 ACUTE DEEP VEIN THROMBOSIS (DVT) OF LEFT LOWER EXTREMITY, UNSPECIFIED VEIN (H): ICD-10-CM

## 2022-02-03 DIAGNOSIS — I26.99 ACUTE PULMONARY EMBOLISM, UNSPECIFIED PULMONARY EMBOLISM TYPE, UNSPECIFIED WHETHER ACUTE COR PULMONALE PRESENT (H): Primary | ICD-10-CM

## 2022-02-03 DIAGNOSIS — J15.69 PNEUMONIA OF LEFT LOWER LOBE DUE TO OTHER AEROBIC GRAM-NEGATIVE BACTERIA (H): ICD-10-CM

## 2022-02-03 DIAGNOSIS — J96.21 ACUTE AND CHRONIC RESPIRATORY FAILURE WITH HYPOXIA (H): ICD-10-CM

## 2022-02-03 PROCEDURE — 99316 NF DSCHRG MGMT 30 MIN+: CPT | Performed by: NURSE PRACTITIONER

## 2022-02-03 ASSESSMENT — MIFFLIN-ST. JEOR: SCORE: 1106.76

## 2022-02-16 ENCOUNTER — LAB REQUISITION (OUTPATIENT)
Dept: LAB | Facility: CLINIC | Age: 87
End: 2022-02-16
Payer: MEDICARE

## 2022-02-16 DIAGNOSIS — I50.23 ACUTE ON CHRONIC SYSTOLIC (CONGESTIVE) HEART FAILURE (H): ICD-10-CM

## 2022-02-17 LAB
ANION GAP SERPL CALCULATED.3IONS-SCNC: 11 MMOL/L (ref 5–18)
BUN SERPL-MCNC: 14 MG/DL (ref 8–28)
CALCIUM SERPL-MCNC: 9.7 MG/DL (ref 8.5–10.5)
CHLORIDE BLD-SCNC: 104 MMOL/L (ref 98–107)
CO2 SERPL-SCNC: 23 MMOL/L (ref 22–31)
CREAT SERPL-MCNC: 0.95 MG/DL (ref 0.6–1.1)
DEPRECATED CALCIDIOL+CALCIFEROL SERPL-MC: 31 UG/L (ref 30–80)
ERYTHROCYTE [DISTWIDTH] IN BLOOD BY AUTOMATED COUNT: 17.7 % (ref 10–15)
GFR SERPL CREATININE-BSD FRML MDRD: 58 ML/MIN/1.73M2
GLUCOSE BLD-MCNC: 97 MG/DL (ref 70–125)
HCT VFR BLD AUTO: 37.4 % (ref 35–47)
HGB BLD-MCNC: 12.1 G/DL (ref 11.7–15.7)
MCH RBC QN AUTO: 29.4 PG (ref 26.5–33)
MCHC RBC AUTO-ENTMCNC: 32.4 G/DL (ref 31.5–36.5)
MCV RBC AUTO: 91 FL (ref 78–100)
PLATELET # BLD AUTO: 318 10E3/UL (ref 150–450)
POTASSIUM BLD-SCNC: 4.6 MMOL/L (ref 3.5–5)
RBC # BLD AUTO: 4.12 10E6/UL (ref 3.8–5.2)
SODIUM SERPL-SCNC: 138 MMOL/L (ref 136–145)
TSH SERPL DL<=0.005 MIU/L-ACNC: 4.13 UIU/ML (ref 0.3–5)
WBC # BLD AUTO: 9.5 10E3/UL (ref 4–11)

## 2022-02-17 PROCEDURE — P9604 ONE-WAY ALLOW PRORATED TRIP: HCPCS | Mod: ORL | Performed by: NURSE PRACTITIONER

## 2022-02-17 PROCEDURE — 80048 BASIC METABOLIC PNL TOTAL CA: CPT | Mod: ORL | Performed by: NURSE PRACTITIONER

## 2022-02-17 PROCEDURE — 85027 COMPLETE CBC AUTOMATED: CPT | Mod: ORL | Performed by: NURSE PRACTITIONER

## 2022-02-17 PROCEDURE — 36415 COLL VENOUS BLD VENIPUNCTURE: CPT | Mod: ORL | Performed by: NURSE PRACTITIONER

## 2022-02-17 PROCEDURE — 84443 ASSAY THYROID STIM HORMONE: CPT | Mod: ORL | Performed by: NURSE PRACTITIONER

## 2022-02-17 PROCEDURE — 82306 VITAMIN D 25 HYDROXY: CPT | Mod: ORL | Performed by: NURSE PRACTITIONER

## 2022-03-12 ENCOUNTER — LAB REQUISITION (OUTPATIENT)
Dept: LAB | Facility: CLINIC | Age: 87
End: 2022-03-12
Payer: MEDICARE

## 2022-03-14 PROCEDURE — P9603 ONE-WAY ALLOW PRORATED MILES: HCPCS | Mod: ORL | Performed by: NURSE PRACTITIONER

## 2022-03-14 PROCEDURE — 36415 COLL VENOUS BLD VENIPUNCTURE: CPT | Mod: ORL | Performed by: NURSE PRACTITIONER

## 2022-03-14 PROCEDURE — 80048 BASIC METABOLIC PNL TOTAL CA: CPT | Mod: ORL | Performed by: NURSE PRACTITIONER

## 2022-03-15 LAB
ANION GAP SERPL CALCULATED.3IONS-SCNC: 12 MMOL/L (ref 5–18)
BUN SERPL-MCNC: 13 MG/DL (ref 8–28)
CALCIUM SERPL-MCNC: 9.2 MG/DL (ref 8.5–10.5)
CHLORIDE BLD-SCNC: 105 MMOL/L (ref 98–107)
CO2 SERPL-SCNC: 24 MMOL/L (ref 22–31)
CREAT SERPL-MCNC: 0.83 MG/DL (ref 0.6–1.1)
GFR SERPL CREATININE-BSD FRML MDRD: 68 ML/MIN/1.73M2
GLUCOSE BLD-MCNC: 93 MG/DL (ref 70–125)
POTASSIUM BLD-SCNC: 4 MMOL/L (ref 3.5–5)
SODIUM SERPL-SCNC: 141 MMOL/L (ref 136–145)

## 2022-03-26 ENCOUNTER — LAB REQUISITION (OUTPATIENT)
Dept: LAB | Facility: CLINIC | Age: 87
End: 2022-03-26
Payer: MEDICARE

## 2022-03-26 DIAGNOSIS — R32 UNSPECIFIED URINARY INCONTINENCE: ICD-10-CM

## 2022-03-26 DIAGNOSIS — R10.9 UNSPECIFIED ABDOMINAL PAIN: ICD-10-CM

## 2022-03-26 LAB
ALBUMIN UR-MCNC: NEGATIVE MG/DL
APPEARANCE UR: ABNORMAL
BACTERIA #/AREA URNS HPF: ABNORMAL /HPF
BILIRUB UR QL STRIP: NEGATIVE
COLOR UR AUTO: YELLOW
GLUCOSE UR STRIP-MCNC: NEGATIVE MG/DL
HGB UR QL STRIP: NEGATIVE
HYALINE CASTS: 44 /LPF
KETONES UR STRIP-MCNC: NEGATIVE MG/DL
LEUKOCYTE ESTERASE UR QL STRIP: ABNORMAL
MUCOUS THREADS #/AREA URNS LPF: PRESENT /LPF
NITRATE UR QL: POSITIVE
PH UR STRIP: 5 [PH] (ref 5–7)
RBC URINE: 2 /HPF
SP GR UR STRIP: 1.01 (ref 1–1.03)
SQUAMOUS EPITHELIAL: 1 /HPF
UROBILINOGEN UR STRIP-MCNC: <2 MG/DL
WBC URINE: 35 /HPF

## 2022-03-26 PROCEDURE — 87088 URINE BACTERIA CULTURE: CPT | Mod: ORL | Performed by: NURSE PRACTITIONER

## 2022-03-26 PROCEDURE — 81001 URINALYSIS AUTO W/SCOPE: CPT | Mod: ORL | Performed by: NURSE PRACTITIONER

## 2022-03-28 LAB — BACTERIA UR CULT: ABNORMAL

## 2022-04-17 ENCOUNTER — LAB REQUISITION (OUTPATIENT)
Dept: LAB | Facility: CLINIC | Age: 87
End: 2022-04-17
Payer: MEDICARE

## 2022-04-19 PROCEDURE — 81001 URINALYSIS AUTO W/SCOPE: CPT | Mod: ORL | Performed by: NURSE PRACTITIONER

## 2022-04-20 ENCOUNTER — LAB REQUISITION (OUTPATIENT)
Dept: LAB | Facility: CLINIC | Age: 87
End: 2022-04-20
Payer: MEDICARE

## 2022-04-20 DIAGNOSIS — R30.9 PAINFUL MICTURITION, UNSPECIFIED: ICD-10-CM

## 2022-04-20 LAB
ALBUMIN UR-MCNC: NEGATIVE MG/DL
APPEARANCE UR: CLEAR
BACTERIA #/AREA URNS HPF: ABNORMAL /HPF
BILIRUB UR QL STRIP: NEGATIVE
CAOX CRY #/AREA URNS HPF: ABNORMAL /HPF
COLOR UR AUTO: ABNORMAL
GLUCOSE UR STRIP-MCNC: NEGATIVE MG/DL
HGB UR QL STRIP: NEGATIVE
HYALINE CASTS: 6 /LPF
KETONES UR STRIP-MCNC: NEGATIVE MG/DL
LEUKOCYTE ESTERASE UR QL STRIP: NEGATIVE
MUCOUS THREADS #/AREA URNS LPF: PRESENT /LPF
NITRATE UR QL: NEGATIVE
PH UR STRIP: 5 [PH] (ref 5–7)
RBC URINE: 9 /HPF
SP GR UR STRIP: 1.01 (ref 1–1.03)
SQUAMOUS EPITHELIAL: <1 /HPF
UROBILINOGEN UR STRIP-MCNC: <2 MG/DL
WBC URINE: 5 /HPF

## 2022-04-29 ENCOUNTER — LAB REQUISITION (OUTPATIENT)
Dept: LAB | Facility: CLINIC | Age: 87
End: 2022-04-29
Payer: MEDICARE

## 2022-04-29 DIAGNOSIS — R30.0 DYSURIA: ICD-10-CM

## 2022-04-29 LAB
ALBUMIN UR-MCNC: NEGATIVE MG/DL
APPEARANCE UR: CLEAR
BILIRUB UR QL STRIP: NEGATIVE
COLOR UR AUTO: NORMAL
GLUCOSE UR STRIP-MCNC: NEGATIVE MG/DL
HGB UR QL STRIP: NEGATIVE
KETONES UR STRIP-MCNC: NEGATIVE MG/DL
LEUKOCYTE ESTERASE UR QL STRIP: NEGATIVE
NITRATE UR QL: NEGATIVE
PH UR STRIP: 6 [PH] (ref 5–7)
RBC URINE: <1 /HPF
SP GR UR STRIP: 1.01 (ref 1–1.03)
UROBILINOGEN UR STRIP-MCNC: <2 MG/DL
WBC URINE: 0 /HPF

## 2022-04-29 PROCEDURE — 81001 URINALYSIS AUTO W/SCOPE: CPT | Mod: ORL | Performed by: FAMILY MEDICINE

## 2022-05-13 ENCOUNTER — LAB REQUISITION (OUTPATIENT)
Dept: LAB | Facility: CLINIC | Age: 87
End: 2022-05-13
Payer: MEDICARE

## 2022-05-13 DIAGNOSIS — R30.0 DYSURIA: ICD-10-CM

## 2022-05-13 LAB
ALBUMIN UR-MCNC: NEGATIVE MG/DL
APPEARANCE UR: CLEAR
BACTERIA #/AREA URNS HPF: ABNORMAL /HPF
BILIRUB UR QL STRIP: NEGATIVE
COLOR UR AUTO: ABNORMAL
GLUCOSE UR STRIP-MCNC: NEGATIVE MG/DL
HGB UR QL STRIP: NEGATIVE
HYALINE CASTS: 11 /LPF
KETONES UR STRIP-MCNC: NEGATIVE MG/DL
LEUKOCYTE ESTERASE UR QL STRIP: NEGATIVE
MUCOUS THREADS #/AREA URNS LPF: PRESENT /LPF
NITRATE UR QL: NEGATIVE
PH UR STRIP: 5 [PH] (ref 5–7)
RBC URINE: <1 /HPF
SP GR UR STRIP: 1.01 (ref 1–1.03)
SQUAMOUS EPITHELIAL: 1 /HPF
UROBILINOGEN UR STRIP-MCNC: <2 MG/DL
WBC URINE: 1 /HPF

## 2022-05-13 PROCEDURE — 87086 URINE CULTURE/COLONY COUNT: CPT | Mod: ORL | Performed by: INTERNAL MEDICINE

## 2022-05-13 PROCEDURE — 81001 URINALYSIS AUTO W/SCOPE: CPT | Mod: ORL | Performed by: INTERNAL MEDICINE

## 2022-05-14 ENCOUNTER — LAB REQUISITION (OUTPATIENT)
Dept: LAB | Facility: CLINIC | Age: 87
End: 2022-05-14
Payer: MEDICARE

## 2022-05-14 DIAGNOSIS — E55.9 VITAMIN D DEFICIENCY, UNSPECIFIED: ICD-10-CM

## 2022-05-14 DIAGNOSIS — E11.65 TYPE 2 DIABETES MELLITUS WITH HYPERGLYCEMIA (H): ICD-10-CM

## 2022-05-14 DIAGNOSIS — K21.9 GASTRO-ESOPHAGEAL REFLUX DISEASE WITHOUT ESOPHAGITIS: ICD-10-CM

## 2022-05-14 DIAGNOSIS — F03.90 UNSPECIFIED DEMENTIA WITHOUT BEHAVIORAL DISTURBANCE: ICD-10-CM

## 2022-05-15 LAB — BACTERIA UR CULT: NORMAL

## 2022-05-16 LAB
ALBUMIN SERPL-MCNC: 3.4 G/DL (ref 3.5–5)
ALP SERPL-CCNC: 86 U/L (ref 45–120)
ALT SERPL W P-5'-P-CCNC: <9 U/L (ref 0–45)
ANION GAP SERPL CALCULATED.3IONS-SCNC: 9 MMOL/L (ref 5–18)
AST SERPL W P-5'-P-CCNC: 14 U/L (ref 0–40)
BILIRUB SERPL-MCNC: 1 MG/DL (ref 0–1)
BUN SERPL-MCNC: 15 MG/DL (ref 8–28)
CALCIUM SERPL-MCNC: 9.5 MG/DL (ref 8.5–10.5)
CHLORIDE BLD-SCNC: 103 MMOL/L (ref 98–107)
CO2 SERPL-SCNC: 25 MMOL/L (ref 22–31)
CREAT SERPL-MCNC: 0.78 MG/DL (ref 0.6–1.1)
DEPRECATED CALCIDIOL+CALCIFEROL SERPL-MC: 41 UG/L (ref 20–75)
ERYTHROCYTE [DISTWIDTH] IN BLOOD BY AUTOMATED COUNT: 12.8 % (ref 10–15)
GFR SERPL CREATININE-BSD FRML MDRD: 73 ML/MIN/1.73M2
GLUCOSE BLD-MCNC: 98 MG/DL (ref 70–125)
HBA1C MFR BLD: 5.5 %
HCT VFR BLD AUTO: 36.5 % (ref 35–47)
HGB BLD-MCNC: 11.7 G/DL (ref 11.7–15.7)
MAGNESIUM SERPL-MCNC: 1.7 MG/DL (ref 1.8–2.6)
MCH RBC QN AUTO: 29.5 PG (ref 26.5–33)
MCHC RBC AUTO-ENTMCNC: 32.1 G/DL (ref 31.5–36.5)
MCV RBC AUTO: 92 FL (ref 78–100)
PLATELET # BLD AUTO: 222 10E3/UL (ref 150–450)
POTASSIUM BLD-SCNC: 4.3 MMOL/L (ref 3.5–5)
PROT SERPL-MCNC: 6.8 G/DL (ref 6–8)
RBC # BLD AUTO: 3.96 10E6/UL (ref 3.8–5.2)
SODIUM SERPL-SCNC: 137 MMOL/L (ref 136–145)
TSH SERPL DL<=0.005 MIU/L-ACNC: 3.15 UIU/ML (ref 0.3–5)
VIT B12 SERPL-MCNC: 995 PG/ML (ref 213–816)
WBC # BLD AUTO: 7.6 10E3/UL (ref 4–11)

## 2022-05-16 PROCEDURE — 83735 ASSAY OF MAGNESIUM: CPT | Mod: ORL | Performed by: INTERNAL MEDICINE

## 2022-05-16 PROCEDURE — 85027 COMPLETE CBC AUTOMATED: CPT | Mod: ORL | Performed by: INTERNAL MEDICINE

## 2022-05-16 PROCEDURE — P9604 ONE-WAY ALLOW PRORATED TRIP: HCPCS | Mod: ORL | Performed by: INTERNAL MEDICINE

## 2022-05-16 PROCEDURE — 82306 VITAMIN D 25 HYDROXY: CPT | Mod: ORL | Performed by: INTERNAL MEDICINE

## 2022-05-16 PROCEDURE — 36415 COLL VENOUS BLD VENIPUNCTURE: CPT | Mod: ORL | Performed by: INTERNAL MEDICINE

## 2022-05-16 PROCEDURE — 83036 HEMOGLOBIN GLYCOSYLATED A1C: CPT | Mod: ORL | Performed by: INTERNAL MEDICINE

## 2022-05-16 PROCEDURE — 80053 COMPREHEN METABOLIC PANEL: CPT | Mod: ORL | Performed by: INTERNAL MEDICINE

## 2022-05-16 PROCEDURE — 82607 VITAMIN B-12: CPT | Mod: ORL | Performed by: INTERNAL MEDICINE

## 2022-05-16 PROCEDURE — 84443 ASSAY THYROID STIM HORMONE: CPT | Mod: ORL | Performed by: INTERNAL MEDICINE

## 2022-07-05 ENCOUNTER — LAB REQUISITION (OUTPATIENT)
Dept: LAB | Facility: CLINIC | Age: 87
End: 2022-07-05
Payer: MEDICARE

## 2022-07-05 DIAGNOSIS — R30.0 DYSURIA: ICD-10-CM

## 2022-07-05 LAB
ALBUMIN UR-MCNC: NEGATIVE MG/DL
APPEARANCE UR: ABNORMAL
BACTERIA #/AREA URNS HPF: ABNORMAL /HPF
BILIRUB UR QL STRIP: NEGATIVE
COLOR UR AUTO: ABNORMAL
GLUCOSE UR STRIP-MCNC: NEGATIVE MG/DL
HGB UR QL STRIP: ABNORMAL
KETONES UR STRIP-MCNC: NEGATIVE MG/DL
LEUKOCYTE ESTERASE UR QL STRIP: ABNORMAL
MUCOUS THREADS #/AREA URNS LPF: PRESENT /LPF
NITRATE UR QL: NEGATIVE
PH UR STRIP: 5 [PH] (ref 5–7)
RBC URINE: 0 /HPF
SP GR UR STRIP: 1.02 (ref 1–1.03)
UROBILINOGEN UR STRIP-MCNC: NORMAL MG/DL
WBC URINE: 19 /HPF

## 2022-07-05 PROCEDURE — 81001 URINALYSIS AUTO W/SCOPE: CPT | Mod: ORL | Performed by: INTERNAL MEDICINE

## 2022-07-05 PROCEDURE — 87086 URINE CULTURE/COLONY COUNT: CPT | Mod: ORL | Performed by: INTERNAL MEDICINE

## 2022-07-07 LAB — BACTERIA UR CULT: ABNORMAL

## 2022-08-02 PROCEDURE — 81001 URINALYSIS AUTO W/SCOPE: CPT | Mod: ORL | Performed by: INTERNAL MEDICINE

## 2022-08-02 PROCEDURE — 87086 URINE CULTURE/COLONY COUNT: CPT | Mod: ORL | Performed by: INTERNAL MEDICINE

## 2022-08-25 ENCOUNTER — TRANSFERRED RECORDS (OUTPATIENT)
Dept: HEALTH INFORMATION MANAGEMENT | Facility: CLINIC | Age: 87
End: 2022-08-25

## 2022-08-26 ENCOUNTER — LAB REQUISITION (OUTPATIENT)
Dept: LAB | Facility: CLINIC | Age: 87
End: 2022-08-26
Payer: MEDICARE

## 2022-08-26 DIAGNOSIS — R30.0 DYSURIA: ICD-10-CM

## 2022-08-26 DIAGNOSIS — E11.65 TYPE 2 DIABETES MELLITUS WITH HYPERGLYCEMIA (H): ICD-10-CM

## 2022-08-26 DIAGNOSIS — K21.9 GASTRO-ESOPHAGEAL REFLUX DISEASE WITHOUT ESOPHAGITIS: ICD-10-CM

## 2022-08-26 DIAGNOSIS — F03.90 UNSPECIFIED DEMENTIA WITHOUT BEHAVIORAL DISTURBANCE: ICD-10-CM

## 2022-08-26 DIAGNOSIS — I50.9 HEART FAILURE, UNSPECIFIED (H): ICD-10-CM

## 2022-08-26 DIAGNOSIS — E55.9 VITAMIN D DEFICIENCY, UNSPECIFIED: ICD-10-CM

## 2022-08-26 LAB
ALBUMIN UR-MCNC: NEGATIVE MG/DL
APPEARANCE UR: CLEAR
BILIRUB UR QL STRIP: NEGATIVE
CAOX CRY #/AREA URNS HPF: ABNORMAL /HPF
COLOR UR AUTO: ABNORMAL
GLUCOSE UR STRIP-MCNC: NEGATIVE MG/DL
HGB UR QL STRIP: ABNORMAL
HYALINE CASTS: 1 /LPF
KETONES UR STRIP-MCNC: NEGATIVE MG/DL
LEUKOCYTE ESTERASE UR QL STRIP: ABNORMAL
MUCOUS THREADS #/AREA URNS LPF: PRESENT /LPF
NITRATE UR QL: NEGATIVE
PH UR STRIP: 5 [PH] (ref 5–7)
RBC URINE: 1 /HPF
SP GR UR STRIP: 1.02 (ref 1–1.03)
SQUAMOUS EPITHELIAL: 2 /HPF
TRANSITIONAL EPI: <1 /HPF
UROBILINOGEN UR STRIP-MCNC: NORMAL MG/DL
WBC URINE: 6 /HPF

## 2022-08-26 PROCEDURE — 87086 URINE CULTURE/COLONY COUNT: CPT | Mod: ORL

## 2022-08-26 PROCEDURE — 81001 URINALYSIS AUTO W/SCOPE: CPT | Mod: ORL

## 2022-08-28 LAB — BACTERIA UR CULT: NORMAL

## 2022-08-29 LAB
ALBUMIN SERPL BCG-MCNC: 4.7 G/DL (ref 3.5–5.2)
ALP SERPL-CCNC: 76 U/L (ref 35–104)
ALT SERPL W P-5'-P-CCNC: 8 U/L (ref 10–35)
ANION GAP SERPL CALCULATED.3IONS-SCNC: 14 MMOL/L (ref 7–15)
AST SERPL W P-5'-P-CCNC: 26 U/L (ref 10–35)
BILIRUB SERPL-MCNC: 0.6 MG/DL
BUN SERPL-MCNC: 17.9 MG/DL (ref 8–23)
CALCIUM SERPL-MCNC: 10 MG/DL (ref 8.8–10.2)
CHLORIDE SERPL-SCNC: 101 MMOL/L (ref 98–107)
CREAT SERPL-MCNC: 1.04 MG/DL (ref 0.51–0.95)
DEPRECATED CALCIDIOL+CALCIFEROL SERPL-MC: 36 UG/L (ref 20–75)
DEPRECATED HCO3 PLAS-SCNC: 23 MMOL/L (ref 22–29)
ERYTHROCYTE [DISTWIDTH] IN BLOOD BY AUTOMATED COUNT: 14.1 % (ref 10–15)
GFR SERPL CREATININE-BSD FRML MDRD: 52 ML/MIN/1.73M2
GLUCOSE SERPL-MCNC: 121 MG/DL (ref 70–99)
HBA1C MFR BLD: 5.6 %
HCT VFR BLD AUTO: 40.8 % (ref 35–47)
HGB BLD-MCNC: 12.9 G/DL (ref 11.7–15.7)
MAGNESIUM SERPL-MCNC: 1.8 MG/DL (ref 1.7–2.3)
MCH RBC QN AUTO: 29.3 PG (ref 26.5–33)
MCHC RBC AUTO-ENTMCNC: 31.6 G/DL (ref 31.5–36.5)
MCV RBC AUTO: 93 FL (ref 78–100)
NT-PROBNP SERPL-MCNC: 484 PG/ML (ref 0–450)
PLATELET # BLD AUTO: 260 10E3/UL (ref 150–450)
POTASSIUM SERPL-SCNC: 3.8 MMOL/L (ref 3.4–5.3)
PROT SERPL-MCNC: 7.5 G/DL (ref 6.4–8.3)
RBC # BLD AUTO: 4.4 10E6/UL (ref 3.8–5.2)
SODIUM SERPL-SCNC: 138 MMOL/L (ref 136–145)
TSH SERPL DL<=0.005 MIU/L-ACNC: 2.41 UIU/ML (ref 0.3–4.2)
URATE SERPL-MCNC: 5.8 MG/DL (ref 2.4–5.7)
VIT B12 SERPL-MCNC: 2340 PG/ML (ref 232–1245)
WBC # BLD AUTO: 7.7 10E3/UL (ref 4–11)

## 2022-08-29 PROCEDURE — 84550 ASSAY OF BLOOD/URIC ACID: CPT | Mod: ORL | Performed by: INTERNAL MEDICINE

## 2022-08-29 PROCEDURE — 36415 COLL VENOUS BLD VENIPUNCTURE: CPT | Mod: ORL | Performed by: INTERNAL MEDICINE

## 2022-08-29 PROCEDURE — 83735 ASSAY OF MAGNESIUM: CPT | Mod: ORL | Performed by: INTERNAL MEDICINE

## 2022-08-29 PROCEDURE — 84443 ASSAY THYROID STIM HORMONE: CPT | Mod: ORL | Performed by: INTERNAL MEDICINE

## 2022-08-29 PROCEDURE — 80053 COMPREHEN METABOLIC PANEL: CPT | Mod: ORL | Performed by: INTERNAL MEDICINE

## 2022-08-29 PROCEDURE — 85027 COMPLETE CBC AUTOMATED: CPT | Mod: ORL | Performed by: INTERNAL MEDICINE

## 2022-08-29 PROCEDURE — P9604 ONE-WAY ALLOW PRORATED TRIP: HCPCS | Mod: ORL | Performed by: INTERNAL MEDICINE

## 2022-08-29 PROCEDURE — 83880 ASSAY OF NATRIURETIC PEPTIDE: CPT | Mod: ORL | Performed by: INTERNAL MEDICINE

## 2022-08-29 PROCEDURE — 83036 HEMOGLOBIN GLYCOSYLATED A1C: CPT | Mod: ORL | Performed by: INTERNAL MEDICINE

## 2022-08-29 PROCEDURE — 82607 VITAMIN B-12: CPT | Mod: ORL | Performed by: INTERNAL MEDICINE

## 2022-08-29 PROCEDURE — 82306 VITAMIN D 25 HYDROXY: CPT | Mod: ORL | Performed by: INTERNAL MEDICINE

## 2022-09-01 ENCOUNTER — MEDICAL CORRESPONDENCE (OUTPATIENT)
Dept: HEALTH INFORMATION MANAGEMENT | Facility: CLINIC | Age: 87
End: 2022-09-01

## 2022-09-05 ENCOUNTER — LAB REQUISITION (OUTPATIENT)
Dept: LAB | Facility: CLINIC | Age: 87
End: 2022-09-05
Payer: MEDICARE

## 2022-09-05 DIAGNOSIS — R30.0 DYSURIA: ICD-10-CM

## 2022-09-05 LAB
ALBUMIN UR-MCNC: NEGATIVE MG/DL
APPEARANCE UR: CLEAR
BILIRUB UR QL STRIP: NEGATIVE
COLOR UR AUTO: ABNORMAL
GLUCOSE UR STRIP-MCNC: NEGATIVE MG/DL
HGB UR QL STRIP: NEGATIVE
HYALINE CASTS: 5 /LPF
KETONES UR STRIP-MCNC: NEGATIVE MG/DL
LEUKOCYTE ESTERASE UR QL STRIP: ABNORMAL
MUCOUS THREADS #/AREA URNS LPF: PRESENT /LPF
NITRATE UR QL: NEGATIVE
PH UR STRIP: 5 [PH] (ref 5–7)
RBC URINE: 0 /HPF
SP GR UR STRIP: 1.01 (ref 1–1.03)
SQUAMOUS EPITHELIAL: <1 /HPF
TRANSITIONAL EPI: <1 /HPF
UROBILINOGEN UR STRIP-MCNC: NORMAL MG/DL
WBC URINE: 4 /HPF

## 2022-09-05 PROCEDURE — 81001 URINALYSIS AUTO W/SCOPE: CPT | Mod: ORL

## 2022-09-12 ENCOUNTER — TRANSCRIBE ORDERS (OUTPATIENT)
Dept: OTHER | Age: 87
End: 2022-09-12

## 2022-09-12 DIAGNOSIS — R25.1 TREMOR: Primary | ICD-10-CM

## 2022-09-12 DIAGNOSIS — R29.818 IMPAIRED PROPRIOCEPTION: ICD-10-CM

## 2022-09-26 PROCEDURE — 87338 HPYLORI STOOL AG IA: CPT | Mod: ORL | Performed by: INTERNAL MEDICINE

## 2022-10-10 ENCOUNTER — LAB REQUISITION (OUTPATIENT)
Dept: LAB | Facility: CLINIC | Age: 87
End: 2022-10-10
Payer: MEDICARE

## 2022-10-10 DIAGNOSIS — R30.0 DYSURIA: ICD-10-CM

## 2022-10-10 LAB
ALBUMIN UR-MCNC: NEGATIVE MG/DL
APPEARANCE UR: CLEAR
BILIRUB UR QL STRIP: NEGATIVE
COLOR UR AUTO: ABNORMAL
GLUCOSE UR STRIP-MCNC: NEGATIVE MG/DL
HGB UR QL STRIP: NEGATIVE
HYALINE CASTS: 7 /LPF
KETONES UR STRIP-MCNC: NEGATIVE MG/DL
LEUKOCYTE ESTERASE UR QL STRIP: ABNORMAL
MUCOUS THREADS #/AREA URNS LPF: PRESENT /LPF
NITRATE UR QL: NEGATIVE
PH UR STRIP: 5 [PH] (ref 5–7)
RBC URINE: 1 /HPF
SP GR UR STRIP: 1.01 (ref 1–1.03)
SQUAMOUS EPITHELIAL: 1 /HPF
TRANSITIONAL EPI: <1 /HPF
UROBILINOGEN UR STRIP-MCNC: NORMAL MG/DL
WBC URINE: 3 /HPF

## 2022-10-10 PROCEDURE — 81001 URINALYSIS AUTO W/SCOPE: CPT | Mod: ORL | Performed by: INTERNAL MEDICINE

## 2022-10-10 PROCEDURE — 87086 URINE CULTURE/COLONY COUNT: CPT | Mod: ORL | Performed by: INTERNAL MEDICINE

## 2022-10-12 LAB — BACTERIA UR CULT: NORMAL

## 2022-12-23 ENCOUNTER — LAB REQUISITION (OUTPATIENT)
Dept: LAB | Facility: CLINIC | Age: 87
End: 2022-12-23
Payer: MEDICARE

## 2022-12-23 DIAGNOSIS — F03.90 UNSPECIFIED DEMENTIA, UNSPECIFIED SEVERITY, WITHOUT BEHAVIORAL DISTURBANCE, PSYCHOTIC DISTURBANCE, MOOD DISTURBANCE, AND ANXIETY (H): ICD-10-CM

## 2022-12-26 LAB
ALBUMIN SERPL BCG-MCNC: 4.4 G/DL (ref 3.5–5.2)
ALP SERPL-CCNC: 72 U/L (ref 35–104)
ALT SERPL W P-5'-P-CCNC: 8 U/L (ref 10–35)
ANION GAP SERPL CALCULATED.3IONS-SCNC: 13 MMOL/L (ref 7–15)
AST SERPL W P-5'-P-CCNC: 21 U/L (ref 10–35)
BILIRUB SERPL-MCNC: 0.4 MG/DL
BUN SERPL-MCNC: 24.4 MG/DL (ref 8–23)
CALCIUM SERPL-MCNC: 9.7 MG/DL (ref 8.8–10.2)
CHLORIDE SERPL-SCNC: 105 MMOL/L (ref 98–107)
CREAT SERPL-MCNC: 1.17 MG/DL (ref 0.51–0.95)
DEPRECATED HCO3 PLAS-SCNC: 21 MMOL/L (ref 22–29)
ERYTHROCYTE [DISTWIDTH] IN BLOOD BY AUTOMATED COUNT: 13.2 % (ref 10–15)
GFR SERPL CREATININE-BSD FRML MDRD: 45 ML/MIN/1.73M2
GLUCOSE SERPL-MCNC: 107 MG/DL (ref 70–99)
HCT VFR BLD AUTO: 39 % (ref 35–47)
HGB BLD-MCNC: 12.3 G/DL (ref 11.7–15.7)
MCH RBC QN AUTO: 30 PG (ref 26.5–33)
MCHC RBC AUTO-ENTMCNC: 31.5 G/DL (ref 31.5–36.5)
MCV RBC AUTO: 95 FL (ref 78–100)
PLATELET # BLD AUTO: 235 10E3/UL (ref 150–450)
POTASSIUM SERPL-SCNC: 4.1 MMOL/L (ref 3.4–5.3)
PROT SERPL-MCNC: 7.2 G/DL (ref 6.4–8.3)
RBC # BLD AUTO: 4.1 10E6/UL (ref 3.8–5.2)
SODIUM SERPL-SCNC: 139 MMOL/L (ref 136–145)
TSH SERPL DL<=0.005 MIU/L-ACNC: 2.7 UIU/ML (ref 0.3–4.2)
WBC # BLD AUTO: 7.2 10E3/UL (ref 4–11)

## 2022-12-26 PROCEDURE — 84443 ASSAY THYROID STIM HORMONE: CPT | Mod: ORL | Performed by: INTERNAL MEDICINE

## 2022-12-26 PROCEDURE — 36415 COLL VENOUS BLD VENIPUNCTURE: CPT | Mod: ORL | Performed by: INTERNAL MEDICINE

## 2022-12-26 PROCEDURE — 80053 COMPREHEN METABOLIC PANEL: CPT | Mod: ORL | Performed by: INTERNAL MEDICINE

## 2022-12-26 PROCEDURE — P9604 ONE-WAY ALLOW PRORATED TRIP: HCPCS | Mod: ORL | Performed by: INTERNAL MEDICINE

## 2022-12-26 PROCEDURE — 85027 COMPLETE CBC AUTOMATED: CPT | Mod: ORL | Performed by: INTERNAL MEDICINE

## 2023-02-16 ENCOUNTER — LAB REQUISITION (OUTPATIENT)
Dept: LAB | Facility: CLINIC | Age: 88
End: 2023-02-16
Payer: MEDICARE

## 2023-02-16 DIAGNOSIS — R30.0 DYSURIA: ICD-10-CM

## 2023-02-16 LAB
ALBUMIN UR-MCNC: 30 MG/DL
APPEARANCE UR: ABNORMAL
BACTERIA #/AREA URNS HPF: ABNORMAL /HPF
BILIRUB UR QL STRIP: NEGATIVE
COLOR UR AUTO: YELLOW
GLUCOSE UR STRIP-MCNC: NEGATIVE MG/DL
HGB UR QL STRIP: ABNORMAL
KETONES UR STRIP-MCNC: NEGATIVE MG/DL
LEUKOCYTE ESTERASE UR QL STRIP: ABNORMAL
MUCOUS THREADS #/AREA URNS LPF: PRESENT /LPF
NITRATE UR QL: NEGATIVE
PH UR STRIP: 5 [PH] (ref 5–7)
RBC URINE: 0 /HPF
SP GR UR STRIP: 1.03 (ref 1–1.03)
SQUAMOUS EPITHELIAL: 4 /HPF
TRANSITIONAL EPI: 3 /HPF
UROBILINOGEN UR STRIP-MCNC: NORMAL MG/DL
WBC CLUMPS #/AREA URNS HPF: PRESENT /HPF
WBC URINE: 32 /HPF

## 2023-02-16 PROCEDURE — 87086 URINE CULTURE/COLONY COUNT: CPT | Mod: ORL | Performed by: INTERNAL MEDICINE

## 2023-02-16 PROCEDURE — 81001 URINALYSIS AUTO W/SCOPE: CPT | Mod: ORL | Performed by: INTERNAL MEDICINE

## 2023-02-18 LAB — BACTERIA UR CULT: ABNORMAL

## 2023-04-21 PROCEDURE — 87086 URINE CULTURE/COLONY COUNT: CPT | Mod: ORL | Performed by: INTERNAL MEDICINE

## 2023-04-21 PROCEDURE — 81001 URINALYSIS AUTO W/SCOPE: CPT | Mod: ORL | Performed by: INTERNAL MEDICINE

## 2023-06-30 ENCOUNTER — LAB REQUISITION (OUTPATIENT)
Dept: LAB | Facility: CLINIC | Age: 88
End: 2023-06-30
Payer: MEDICARE

## 2023-06-30 DIAGNOSIS — F03.90 UNSPECIFIED DEMENTIA, UNSPECIFIED SEVERITY, WITHOUT BEHAVIORAL DISTURBANCE, PSYCHOTIC DISTURBANCE, MOOD DISTURBANCE, AND ANXIETY (H): ICD-10-CM

## 2023-06-30 DIAGNOSIS — E11.65 TYPE 2 DIABETES MELLITUS WITH HYPERGLYCEMIA (H): ICD-10-CM

## 2023-07-03 LAB
ALBUMIN SERPL BCG-MCNC: 4.5 G/DL (ref 3.5–5.2)
ALP SERPL-CCNC: 69 U/L (ref 35–104)
ALT SERPL W P-5'-P-CCNC: <5 U/L (ref 0–50)
ANION GAP SERPL CALCULATED.3IONS-SCNC: 15 MMOL/L (ref 7–15)
AST SERPL W P-5'-P-CCNC: 25 U/L (ref 0–45)
BILIRUB SERPL-MCNC: 0.6 MG/DL
BUN SERPL-MCNC: 13.4 MG/DL (ref 8–23)
CALCIUM SERPL-MCNC: 9.6 MG/DL (ref 8.8–10.2)
CHLORIDE SERPL-SCNC: 103 MMOL/L (ref 98–107)
CREAT SERPL-MCNC: 0.95 MG/DL (ref 0.51–0.95)
DEPRECATED HCO3 PLAS-SCNC: 22 MMOL/L (ref 22–29)
ERYTHROCYTE [DISTWIDTH] IN BLOOD BY AUTOMATED COUNT: 12.7 % (ref 10–15)
GFR SERPL CREATININE-BSD FRML MDRD: 57 ML/MIN/1.73M2
GLUCOSE SERPL-MCNC: 63 MG/DL (ref 70–99)
HBA1C MFR BLD: 5.5 %
HCT VFR BLD AUTO: 41.1 % (ref 35–47)
HGB BLD-MCNC: 13.1 G/DL (ref 11.7–15.7)
MCH RBC QN AUTO: 31.8 PG (ref 26.5–33)
MCHC RBC AUTO-ENTMCNC: 31.9 G/DL (ref 31.5–36.5)
MCV RBC AUTO: 100 FL (ref 78–100)
PLATELET # BLD AUTO: 255 10E3/UL (ref 150–450)
POTASSIUM SERPL-SCNC: 4.3 MMOL/L (ref 3.4–5.3)
PROT SERPL-MCNC: 7.4 G/DL (ref 6.4–8.3)
RBC # BLD AUTO: 4.12 10E6/UL (ref 3.8–5.2)
SODIUM SERPL-SCNC: 140 MMOL/L (ref 136–145)
TSH SERPL DL<=0.005 MIU/L-ACNC: 2.65 UIU/ML (ref 0.3–4.2)
VIT B12 SERPL-MCNC: 1261 PG/ML (ref 232–1245)
WBC # BLD AUTO: 9.5 10E3/UL (ref 4–11)

## 2023-07-03 PROCEDURE — 82607 VITAMIN B-12: CPT | Mod: ORL | Performed by: INTERNAL MEDICINE

## 2023-07-03 PROCEDURE — 85027 COMPLETE CBC AUTOMATED: CPT | Mod: ORL | Performed by: INTERNAL MEDICINE

## 2023-07-03 PROCEDURE — 84443 ASSAY THYROID STIM HORMONE: CPT | Mod: ORL | Performed by: INTERNAL MEDICINE

## 2023-07-03 PROCEDURE — P9604 ONE-WAY ALLOW PRORATED TRIP: HCPCS | Mod: ORL | Performed by: INTERNAL MEDICINE

## 2023-07-03 PROCEDURE — 80053 COMPREHEN METABOLIC PANEL: CPT | Mod: ORL | Performed by: INTERNAL MEDICINE

## 2023-07-03 PROCEDURE — 83036 HEMOGLOBIN GLYCOSYLATED A1C: CPT | Mod: ORL | Performed by: INTERNAL MEDICINE

## 2023-07-03 PROCEDURE — 36415 COLL VENOUS BLD VENIPUNCTURE: CPT | Mod: ORL | Performed by: INTERNAL MEDICINE

## 2023-08-16 ENCOUNTER — LAB REQUISITION (OUTPATIENT)
Dept: LAB | Facility: CLINIC | Age: 88
End: 2023-08-16
Payer: MEDICARE

## 2023-08-16 DIAGNOSIS — R19.7 DIARRHEA, UNSPECIFIED: ICD-10-CM

## 2023-08-16 LAB — C DIFF TOX B STL QL: NEGATIVE

## 2023-08-16 PROCEDURE — 87493 C DIFF AMPLIFIED PROBE: CPT | Mod: ORL | Performed by: INTERNAL MEDICINE

## 2023-09-16 ENCOUNTER — LAB REQUISITION (OUTPATIENT)
Dept: LAB | Facility: CLINIC | Age: 88
End: 2023-09-16
Payer: MEDICARE

## 2023-09-16 DIAGNOSIS — I10 ESSENTIAL (PRIMARY) HYPERTENSION: ICD-10-CM

## 2023-09-18 LAB
ANION GAP SERPL CALCULATED.3IONS-SCNC: 13 MMOL/L (ref 7–15)
BUN SERPL-MCNC: 13.3 MG/DL (ref 8–23)
CALCIUM SERPL-MCNC: 9.4 MG/DL (ref 8.8–10.2)
CHLORIDE SERPL-SCNC: 105 MMOL/L (ref 98–107)
CREAT SERPL-MCNC: 0.95 MG/DL (ref 0.51–0.95)
DEPRECATED HCO3 PLAS-SCNC: 22 MMOL/L (ref 22–29)
EGFRCR SERPLBLD CKD-EPI 2021: 57 ML/MIN/1.73M2
GLUCOSE SERPL-MCNC: 95 MG/DL (ref 70–99)
POTASSIUM SERPL-SCNC: 3.9 MMOL/L (ref 3.4–5.3)
SODIUM SERPL-SCNC: 140 MMOL/L (ref 136–145)

## 2023-09-18 PROCEDURE — P9604 ONE-WAY ALLOW PRORATED TRIP: HCPCS | Mod: ORL | Performed by: INTERNAL MEDICINE

## 2023-09-18 PROCEDURE — 80048 BASIC METABOLIC PNL TOTAL CA: CPT | Mod: ORL | Performed by: INTERNAL MEDICINE

## 2023-09-18 PROCEDURE — 36415 COLL VENOUS BLD VENIPUNCTURE: CPT | Mod: ORL | Performed by: INTERNAL MEDICINE

## 2023-10-03 ENCOUNTER — LAB REQUISITION (OUTPATIENT)
Dept: LAB | Facility: CLINIC | Age: 88
End: 2023-10-03
Payer: MEDICARE

## 2023-10-03 DIAGNOSIS — R30.0 DYSURIA: ICD-10-CM

## 2023-10-03 LAB
ALBUMIN UR-MCNC: NEGATIVE MG/DL
APPEARANCE UR: ABNORMAL
BACTERIA #/AREA URNS HPF: ABNORMAL /HPF
BILIRUB UR QL STRIP: NEGATIVE
COLOR UR AUTO: ABNORMAL
GLUCOSE UR STRIP-MCNC: NEGATIVE MG/DL
HGB UR QL STRIP: ABNORMAL
HYALINE CASTS: 3 /LPF
KETONES UR STRIP-MCNC: NEGATIVE MG/DL
LEUKOCYTE ESTERASE UR QL STRIP: ABNORMAL
MUCOUS THREADS #/AREA URNS LPF: PRESENT /LPF
NITRATE UR QL: NEGATIVE
PH UR STRIP: 5 [PH] (ref 5–7)
RBC URINE: 1 /HPF
SP GR UR STRIP: 1.01 (ref 1–1.03)
SQUAMOUS EPITHELIAL: <1 /HPF
UROBILINOGEN UR STRIP-MCNC: NORMAL MG/DL
WBC URINE: 13 /HPF

## 2023-10-03 PROCEDURE — 81001 URINALYSIS AUTO W/SCOPE: CPT | Mod: ORL

## 2023-10-20 ENCOUNTER — LAB REQUISITION (OUTPATIENT)
Dept: LAB | Facility: CLINIC | Age: 88
End: 2023-10-20
Payer: MEDICARE

## 2023-10-20 DIAGNOSIS — E55.9 VITAMIN D DEFICIENCY, UNSPECIFIED: ICD-10-CM

## 2023-10-20 DIAGNOSIS — K21.9 GASTRO-ESOPHAGEAL REFLUX DISEASE WITHOUT ESOPHAGITIS: ICD-10-CM

## 2023-10-23 LAB
ALBUMIN SERPL BCG-MCNC: 4.3 G/DL (ref 3.5–5.2)
ALP SERPL-CCNC: 66 U/L (ref 35–104)
ALT SERPL W P-5'-P-CCNC: 6 U/L (ref 0–50)
ANION GAP SERPL CALCULATED.3IONS-SCNC: 13 MMOL/L (ref 7–15)
AST SERPL W P-5'-P-CCNC: 19 U/L (ref 0–45)
BILIRUB SERPL-MCNC: 0.8 MG/DL
BUN SERPL-MCNC: 12.2 MG/DL (ref 8–23)
CALCIUM SERPL-MCNC: 9.2 MG/DL (ref 8.8–10.2)
CHLORIDE SERPL-SCNC: 100 MMOL/L (ref 98–107)
CREAT SERPL-MCNC: 0.8 MG/DL (ref 0.51–0.95)
DEPRECATED HCO3 PLAS-SCNC: 28 MMOL/L (ref 22–29)
EGFRCR SERPLBLD CKD-EPI 2021: 70 ML/MIN/1.73M2
ERYTHROCYTE [DISTWIDTH] IN BLOOD BY AUTOMATED COUNT: 13.7 % (ref 10–15)
GLUCOSE SERPL-MCNC: 103 MG/DL (ref 70–99)
HCT VFR BLD AUTO: 37.5 % (ref 35–47)
HGB BLD-MCNC: 12.1 G/DL (ref 11.7–15.7)
MAGNESIUM SERPL-MCNC: 1.6 MG/DL (ref 1.7–2.3)
MCH RBC QN AUTO: 31 PG (ref 26.5–33)
MCHC RBC AUTO-ENTMCNC: 32.3 G/DL (ref 31.5–36.5)
MCV RBC AUTO: 96 FL (ref 78–100)
PLATELET # BLD AUTO: 206 10E3/UL (ref 150–450)
POTASSIUM SERPL-SCNC: 2.8 MMOL/L (ref 3.4–5.3)
PROT SERPL-MCNC: 6.7 G/DL (ref 6.4–8.3)
RBC # BLD AUTO: 3.9 10E6/UL (ref 3.8–5.2)
SODIUM SERPL-SCNC: 141 MMOL/L (ref 135–145)
TSH SERPL DL<=0.005 MIU/L-ACNC: 1.79 UIU/ML (ref 0.3–4.2)
VIT B12 SERPL-MCNC: 569 PG/ML (ref 232–1245)
VIT D+METAB SERPL-MCNC: 43 NG/ML (ref 20–50)
WBC # BLD AUTO: 6.5 10E3/UL (ref 4–11)

## 2023-10-23 PROCEDURE — 84443 ASSAY THYROID STIM HORMONE: CPT | Mod: ORL | Performed by: INTERNAL MEDICINE

## 2023-10-23 PROCEDURE — 82607 VITAMIN B-12: CPT | Mod: ORL | Performed by: INTERNAL MEDICINE

## 2023-10-23 PROCEDURE — 36415 COLL VENOUS BLD VENIPUNCTURE: CPT | Mod: ORL | Performed by: INTERNAL MEDICINE

## 2023-10-23 PROCEDURE — 85027 COMPLETE CBC AUTOMATED: CPT | Mod: ORL | Performed by: INTERNAL MEDICINE

## 2023-10-23 PROCEDURE — 82306 VITAMIN D 25 HYDROXY: CPT | Mod: ORL | Performed by: INTERNAL MEDICINE

## 2023-10-23 PROCEDURE — 83735 ASSAY OF MAGNESIUM: CPT | Mod: ORL | Performed by: INTERNAL MEDICINE

## 2023-10-23 PROCEDURE — P9604 ONE-WAY ALLOW PRORATED TRIP: HCPCS | Mod: ORL | Performed by: INTERNAL MEDICINE

## 2023-10-23 PROCEDURE — 80053 COMPREHEN METABOLIC PANEL: CPT | Mod: ORL | Performed by: INTERNAL MEDICINE

## 2023-11-25 ENCOUNTER — LAB REQUISITION (OUTPATIENT)
Dept: LAB | Facility: CLINIC | Age: 88
End: 2023-11-25
Payer: MEDICARE

## 2023-11-25 DIAGNOSIS — E11.65 TYPE 2 DIABETES MELLITUS WITH HYPERGLYCEMIA (H): ICD-10-CM

## 2023-11-25 DIAGNOSIS — E55.9 VITAMIN D DEFICIENCY, UNSPECIFIED: ICD-10-CM

## 2023-11-25 DIAGNOSIS — F03.90 UNSPECIFIED DEMENTIA, UNSPECIFIED SEVERITY, WITHOUT BEHAVIORAL DISTURBANCE, PSYCHOTIC DISTURBANCE, MOOD DISTURBANCE, AND ANXIETY (H): ICD-10-CM

## 2023-11-25 DIAGNOSIS — K21.9 GASTRO-ESOPHAGEAL REFLUX DISEASE WITHOUT ESOPHAGITIS: ICD-10-CM

## 2023-11-25 DIAGNOSIS — E78.5 HYPERLIPIDEMIA, UNSPECIFIED: ICD-10-CM

## 2023-11-27 LAB
ALBUMIN SERPL BCG-MCNC: 3.7 G/DL (ref 3.5–5.2)
ALP SERPL-CCNC: 76 U/L (ref 40–150)
ALT SERPL W P-5'-P-CCNC: <5 U/L (ref 0–50)
ANION GAP SERPL CALCULATED.3IONS-SCNC: 13 MMOL/L (ref 7–15)
AST SERPL W P-5'-P-CCNC: 19 U/L (ref 0–45)
BILIRUB SERPL-MCNC: 0.7 MG/DL
BUN SERPL-MCNC: 12.7 MG/DL (ref 8–23)
CALCIUM SERPL-MCNC: 9 MG/DL (ref 8.8–10.2)
CHLORIDE SERPL-SCNC: 100 MMOL/L (ref 98–107)
CHOLEST SERPL-MCNC: 170 MG/DL
CREAT SERPL-MCNC: 0.8 MG/DL (ref 0.51–0.95)
DEPRECATED HCO3 PLAS-SCNC: 28 MMOL/L (ref 22–29)
EGFRCR SERPLBLD CKD-EPI 2021: 70 ML/MIN/1.73M2
ERYTHROCYTE [DISTWIDTH] IN BLOOD BY AUTOMATED COUNT: 13.1 % (ref 10–15)
GLUCOSE SERPL-MCNC: 112 MG/DL (ref 70–99)
HBA1C MFR BLD: 5.6 %
HCT VFR BLD AUTO: 37.8 % (ref 35–47)
HDLC SERPL-MCNC: 53 MG/DL
HGB BLD-MCNC: 12 G/DL (ref 11.7–15.7)
LDLC SERPL CALC-MCNC: 101 MG/DL
MAGNESIUM SERPL-MCNC: 1.5 MG/DL (ref 1.7–2.3)
MCH RBC QN AUTO: 30.2 PG (ref 26.5–33)
MCHC RBC AUTO-ENTMCNC: 31.7 G/DL (ref 31.5–36.5)
MCV RBC AUTO: 95 FL (ref 78–100)
NONHDLC SERPL-MCNC: 117 MG/DL
PLATELET # BLD AUTO: 177 10E3/UL (ref 150–450)
POTASSIUM SERPL-SCNC: 3 MMOL/L (ref 3.4–5.3)
PROT SERPL-MCNC: 6.5 G/DL (ref 6.4–8.3)
RBC # BLD AUTO: 3.97 10E6/UL (ref 3.8–5.2)
SODIUM SERPL-SCNC: 141 MMOL/L (ref 135–145)
TRIGL SERPL-MCNC: 79 MG/DL
TSH SERPL DL<=0.005 MIU/L-ACNC: 1.8 UIU/ML (ref 0.3–4.2)
VIT B12 SERPL-MCNC: 663 PG/ML (ref 232–1245)
VIT D+METAB SERPL-MCNC: 52 NG/ML (ref 20–50)
WBC # BLD AUTO: 6.8 10E3/UL (ref 4–11)

## 2023-11-27 PROCEDURE — 36415 COLL VENOUS BLD VENIPUNCTURE: CPT | Mod: ORL | Performed by: INTERNAL MEDICINE

## 2023-11-27 PROCEDURE — 80061 LIPID PANEL: CPT | Mod: ORL | Performed by: INTERNAL MEDICINE

## 2023-11-27 PROCEDURE — 80053 COMPREHEN METABOLIC PANEL: CPT | Mod: ORL | Performed by: INTERNAL MEDICINE

## 2023-11-27 PROCEDURE — 83735 ASSAY OF MAGNESIUM: CPT | Mod: ORL | Performed by: INTERNAL MEDICINE

## 2023-11-27 PROCEDURE — 85027 COMPLETE CBC AUTOMATED: CPT | Mod: ORL | Performed by: INTERNAL MEDICINE

## 2023-11-27 PROCEDURE — P9604 ONE-WAY ALLOW PRORATED TRIP: HCPCS | Mod: ORL | Performed by: INTERNAL MEDICINE

## 2023-11-27 PROCEDURE — 82607 VITAMIN B-12: CPT | Mod: ORL | Performed by: INTERNAL MEDICINE

## 2023-11-27 PROCEDURE — 83036 HEMOGLOBIN GLYCOSYLATED A1C: CPT | Mod: ORL | Performed by: INTERNAL MEDICINE

## 2023-11-27 PROCEDURE — 84443 ASSAY THYROID STIM HORMONE: CPT | Mod: ORL | Performed by: INTERNAL MEDICINE

## 2023-11-27 PROCEDURE — 82306 VITAMIN D 25 HYDROXY: CPT | Mod: ORL | Performed by: INTERNAL MEDICINE

## 2023-12-08 ENCOUNTER — LAB REQUISITION (OUTPATIENT)
Dept: LAB | Facility: CLINIC | Age: 88
End: 2023-12-08
Payer: MEDICARE

## 2023-12-08 DIAGNOSIS — I10 ESSENTIAL (PRIMARY) HYPERTENSION: ICD-10-CM

## 2023-12-11 LAB
ALBUMIN SERPL BCG-MCNC: 4.1 G/DL (ref 3.5–5.2)
ALP SERPL-CCNC: 70 U/L (ref 40–150)
ALT SERPL W P-5'-P-CCNC: 6 U/L (ref 0–50)
ANION GAP SERPL CALCULATED.3IONS-SCNC: 12 MMOL/L (ref 7–15)
AST SERPL W P-5'-P-CCNC: 31 U/L (ref 0–45)
BILIRUB SERPL-MCNC: 0.5 MG/DL
BUN SERPL-MCNC: 12.3 MG/DL (ref 8–23)
CALCIUM SERPL-MCNC: 9.6 MG/DL (ref 8.8–10.2)
CHLORIDE SERPL-SCNC: 101 MMOL/L (ref 98–107)
CREAT SERPL-MCNC: 1.01 MG/DL (ref 0.51–0.95)
DEPRECATED HCO3 PLAS-SCNC: 28 MMOL/L (ref 22–29)
EGFRCR SERPLBLD CKD-EPI 2021: 53 ML/MIN/1.73M2
GLUCOSE SERPL-MCNC: 99 MG/DL (ref 70–99)
POTASSIUM SERPL-SCNC: 4.4 MMOL/L (ref 3.4–5.3)
PROT SERPL-MCNC: 6.9 G/DL (ref 6.4–8.3)
SODIUM SERPL-SCNC: 141 MMOL/L (ref 135–145)

## 2023-12-11 PROCEDURE — P9604 ONE-WAY ALLOW PRORATED TRIP: HCPCS | Mod: ORL | Performed by: INTERNAL MEDICINE

## 2023-12-11 PROCEDURE — 80053 COMPREHEN METABOLIC PANEL: CPT | Mod: ORL | Performed by: INTERNAL MEDICINE

## 2023-12-11 PROCEDURE — 36415 COLL VENOUS BLD VENIPUNCTURE: CPT | Mod: ORL | Performed by: INTERNAL MEDICINE

## 2024-01-03 ENCOUNTER — LAB REQUISITION (OUTPATIENT)
Dept: LAB | Facility: CLINIC | Age: 89
End: 2024-01-03
Payer: MEDICARE

## 2024-01-03 DIAGNOSIS — R30.0 DYSURIA: ICD-10-CM

## 2024-01-03 LAB
ALBUMIN UR-MCNC: NEGATIVE MG/DL
APPEARANCE UR: ABNORMAL
BACTERIA #/AREA URNS HPF: ABNORMAL /HPF
BILIRUB UR QL STRIP: NEGATIVE
COLOR UR AUTO: ABNORMAL
GLUCOSE UR STRIP-MCNC: NEGATIVE MG/DL
HGB UR QL STRIP: NEGATIVE
KETONES UR STRIP-MCNC: NEGATIVE MG/DL
LEUKOCYTE ESTERASE UR QL STRIP: ABNORMAL
MUCOUS THREADS #/AREA URNS LPF: PRESENT /LPF
NITRATE UR QL: POSITIVE
PH UR STRIP: 5 [PH] (ref 5–7)
RBC URINE: <1 /HPF
SP GR UR STRIP: 1.02 (ref 1–1.03)
SQUAMOUS EPITHELIAL: <1 /HPF
TRANSITIONAL EPI: <1 /HPF
UROBILINOGEN UR STRIP-MCNC: NORMAL MG/DL
WBC URINE: 26 /HPF

## 2024-01-03 PROCEDURE — 87086 URINE CULTURE/COLONY COUNT: CPT | Mod: ORL | Performed by: INTERNAL MEDICINE

## 2024-01-03 PROCEDURE — 87186 SC STD MICRODIL/AGAR DIL: CPT | Mod: ORL | Performed by: INTERNAL MEDICINE

## 2024-01-03 PROCEDURE — 81001 URINALYSIS AUTO W/SCOPE: CPT | Mod: ORL | Performed by: INTERNAL MEDICINE

## 2024-01-04 LAB — BACTERIA UR CULT: ABNORMAL

## 2024-03-25 ENCOUNTER — MEDICAL CORRESPONDENCE (OUTPATIENT)
Dept: HEALTH INFORMATION MANAGEMENT | Facility: CLINIC | Age: 89
End: 2024-03-25
Payer: MEDICARE

## 2024-03-27 ENCOUNTER — TRANSFERRED RECORDS (OUTPATIENT)
Dept: HEALTH INFORMATION MANAGEMENT | Facility: CLINIC | Age: 89
End: 2024-03-27
Payer: MEDICARE

## 2024-03-29 ENCOUNTER — LAB REQUISITION (OUTPATIENT)
Dept: LAB | Facility: CLINIC | Age: 89
End: 2024-03-29
Payer: MEDICARE

## 2024-03-29 DIAGNOSIS — F03.90 UNSPECIFIED DEMENTIA, UNSPECIFIED SEVERITY, WITHOUT BEHAVIORAL DISTURBANCE, PSYCHOTIC DISTURBANCE, MOOD DISTURBANCE, AND ANXIETY (H): ICD-10-CM

## 2024-04-01 LAB
ALBUMIN SERPL BCG-MCNC: 3.9 G/DL (ref 3.5–5.2)
ALP SERPL-CCNC: 86 U/L (ref 40–150)
ALT SERPL W P-5'-P-CCNC: 8 U/L (ref 0–50)
ANION GAP SERPL CALCULATED.3IONS-SCNC: 11 MMOL/L (ref 7–15)
AST SERPL W P-5'-P-CCNC: 20 U/L (ref 0–45)
BILIRUB SERPL-MCNC: 0.4 MG/DL
BUN SERPL-MCNC: 14.6 MG/DL (ref 8–23)
CALCIUM SERPL-MCNC: 9.2 MG/DL (ref 8.8–10.2)
CHLORIDE SERPL-SCNC: 99 MMOL/L (ref 98–107)
CREAT SERPL-MCNC: 1.07 MG/DL (ref 0.51–0.95)
DEPRECATED HCO3 PLAS-SCNC: 30 MMOL/L (ref 22–29)
EGFRCR SERPLBLD CKD-EPI 2021: 49 ML/MIN/1.73M2
GLUCOSE SERPL-MCNC: 109 MG/DL (ref 70–99)
POTASSIUM SERPL-SCNC: 3.9 MMOL/L (ref 3.4–5.3)
PROT SERPL-MCNC: 6.5 G/DL (ref 6.4–8.3)
SODIUM SERPL-SCNC: 140 MMOL/L (ref 135–145)
TSH SERPL DL<=0.005 MIU/L-ACNC: 2.02 UIU/ML (ref 0.3–4.2)
VIT B12 SERPL-MCNC: 662 PG/ML (ref 232–1245)

## 2024-04-01 PROCEDURE — 84443 ASSAY THYROID STIM HORMONE: CPT | Mod: ORL | Performed by: INTERNAL MEDICINE

## 2024-04-01 PROCEDURE — 80053 COMPREHEN METABOLIC PANEL: CPT | Mod: ORL | Performed by: INTERNAL MEDICINE

## 2024-04-01 PROCEDURE — 82607 VITAMIN B-12: CPT | Mod: ORL | Performed by: INTERNAL MEDICINE

## 2024-04-01 PROCEDURE — 36415 COLL VENOUS BLD VENIPUNCTURE: CPT | Mod: ORL | Performed by: INTERNAL MEDICINE

## 2024-04-01 PROCEDURE — P9604 ONE-WAY ALLOW PRORATED TRIP: HCPCS | Mod: ORL | Performed by: INTERNAL MEDICINE

## 2024-04-12 ENCOUNTER — TRANSCRIBE ORDERS (OUTPATIENT)
Dept: OTHER | Age: 89
End: 2024-04-12

## 2024-04-12 DIAGNOSIS — R29.898 WEAKNESS OF BOTH LOWER EXTREMITIES: Primary | ICD-10-CM

## 2024-05-24 ENCOUNTER — LAB REQUISITION (OUTPATIENT)
Dept: LAB | Facility: CLINIC | Age: 89
End: 2024-05-24
Payer: MEDICARE

## 2024-05-24 DIAGNOSIS — E55.9 VITAMIN D DEFICIENCY, UNSPECIFIED: ICD-10-CM

## 2024-05-24 DIAGNOSIS — F03.90 UNSPECIFIED DEMENTIA, UNSPECIFIED SEVERITY, WITHOUT BEHAVIORAL DISTURBANCE, PSYCHOTIC DISTURBANCE, MOOD DISTURBANCE, AND ANXIETY (H): ICD-10-CM

## 2024-05-24 DIAGNOSIS — E11.65 TYPE 2 DIABETES MELLITUS WITH HYPERGLYCEMIA (H): ICD-10-CM

## 2024-05-24 DIAGNOSIS — K21.9 GASTRO-ESOPHAGEAL REFLUX DISEASE WITHOUT ESOPHAGITIS: ICD-10-CM

## 2024-05-30 LAB
ERYTHROCYTE [DISTWIDTH] IN BLOOD BY AUTOMATED COUNT: 14 % (ref 10–15)
HBA1C MFR BLD: 5.5 %
HCT VFR BLD AUTO: 38.8 % (ref 35–47)
HGB BLD-MCNC: 12.6 G/DL (ref 11.7–15.7)
MCH RBC QN AUTO: 32.1 PG (ref 26.5–33)
MCHC RBC AUTO-ENTMCNC: 32.5 G/DL (ref 31.5–36.5)
MCV RBC AUTO: 99 FL (ref 78–100)
PLATELET # BLD AUTO: 216 10E3/UL (ref 150–450)
RBC # BLD AUTO: 3.93 10E6/UL (ref 3.8–5.2)
WBC # BLD AUTO: 7.2 10E3/UL (ref 4–11)

## 2024-05-30 PROCEDURE — 85027 COMPLETE CBC AUTOMATED: CPT | Mod: ORL | Performed by: INTERNAL MEDICINE

## 2024-05-30 PROCEDURE — 36415 COLL VENOUS BLD VENIPUNCTURE: CPT | Mod: ORL | Performed by: INTERNAL MEDICINE

## 2024-05-30 PROCEDURE — 80053 COMPREHEN METABOLIC PANEL: CPT | Mod: ORL | Performed by: INTERNAL MEDICINE

## 2024-05-30 PROCEDURE — 82306 VITAMIN D 25 HYDROXY: CPT | Mod: ORL | Performed by: INTERNAL MEDICINE

## 2024-05-30 PROCEDURE — 84443 ASSAY THYROID STIM HORMONE: CPT | Mod: ORL | Performed by: INTERNAL MEDICINE

## 2024-05-30 PROCEDURE — P9604 ONE-WAY ALLOW PRORATED TRIP: HCPCS | Mod: ORL | Performed by: INTERNAL MEDICINE

## 2024-05-30 PROCEDURE — 82607 VITAMIN B-12: CPT | Mod: ORL | Performed by: INTERNAL MEDICINE

## 2024-05-30 PROCEDURE — 83735 ASSAY OF MAGNESIUM: CPT | Mod: ORL | Performed by: INTERNAL MEDICINE

## 2024-05-30 PROCEDURE — 83036 HEMOGLOBIN GLYCOSYLATED A1C: CPT | Mod: ORL | Performed by: INTERNAL MEDICINE

## 2024-05-31 LAB
ALBUMIN SERPL BCG-MCNC: 4.1 G/DL (ref 3.5–5.2)
ALP SERPL-CCNC: 75 U/L (ref 40–150)
ALT SERPL W P-5'-P-CCNC: 8 U/L (ref 0–50)
ANION GAP SERPL CALCULATED.3IONS-SCNC: 13 MMOL/L (ref 7–15)
AST SERPL W P-5'-P-CCNC: 28 U/L (ref 0–45)
BILIRUB SERPL-MCNC: 0.5 MG/DL
BUN SERPL-MCNC: 15 MG/DL (ref 8–23)
CALCIUM SERPL-MCNC: 9 MG/DL (ref 8.8–10.2)
CHLORIDE SERPL-SCNC: 102 MMOL/L (ref 98–107)
CREAT SERPL-MCNC: 1.07 MG/DL (ref 0.51–0.95)
DEPRECATED HCO3 PLAS-SCNC: 25 MMOL/L (ref 22–29)
EGFRCR SERPLBLD CKD-EPI 2021: 49 ML/MIN/1.73M2
GLUCOSE SERPL-MCNC: 88 MG/DL (ref 70–99)
MAGNESIUM SERPL-MCNC: 1.8 MG/DL (ref 1.7–2.3)
POTASSIUM SERPL-SCNC: 4.2 MMOL/L (ref 3.4–5.3)
PROT SERPL-MCNC: 6.6 G/DL (ref 6.4–8.3)
SODIUM SERPL-SCNC: 140 MMOL/L (ref 135–145)
TSH SERPL DL<=0.005 MIU/L-ACNC: 1.76 UIU/ML (ref 0.3–4.2)
VIT B12 SERPL-MCNC: 707 PG/ML (ref 232–1245)
VIT D+METAB SERPL-MCNC: 54 NG/ML (ref 20–50)

## 2024-06-14 ENCOUNTER — LAB REQUISITION (OUTPATIENT)
Dept: LAB | Facility: CLINIC | Age: 89
End: 2024-06-14
Payer: MEDICARE

## 2024-06-14 DIAGNOSIS — I10 ESSENTIAL (PRIMARY) HYPERTENSION: ICD-10-CM

## 2024-06-17 ENCOUNTER — LAB REQUISITION (OUTPATIENT)
Dept: LAB | Facility: CLINIC | Age: 89
End: 2024-06-17
Payer: MEDICARE

## 2024-06-17 DIAGNOSIS — R30.0 DYSURIA: ICD-10-CM

## 2024-06-17 LAB
ALBUMIN SERPL BCG-MCNC: 3.8 G/DL (ref 3.5–5.2)
ALBUMIN UR-MCNC: 10 MG/DL
ALP SERPL-CCNC: 75 U/L (ref 40–150)
ALT SERPL W P-5'-P-CCNC: 5 U/L (ref 0–50)
ANION GAP SERPL CALCULATED.3IONS-SCNC: 14 MMOL/L (ref 7–15)
APPEARANCE UR: ABNORMAL
AST SERPL W P-5'-P-CCNC: 27 U/L (ref 0–45)
BACTERIA #/AREA URNS HPF: ABNORMAL /HPF
BILIRUB SERPL-MCNC: 0.7 MG/DL
BILIRUB UR QL STRIP: NEGATIVE
BUN SERPL-MCNC: 17.2 MG/DL (ref 8–23)
CALCIUM SERPL-MCNC: 9.3 MG/DL (ref 8.8–10.2)
CAOX CRY #/AREA URNS HPF: ABNORMAL /HPF
CHLORIDE SERPL-SCNC: 105 MMOL/L (ref 98–107)
COLOR UR AUTO: YELLOW
CREAT SERPL-MCNC: 1.01 MG/DL (ref 0.51–0.95)
DEPRECATED HCO3 PLAS-SCNC: 23 MMOL/L (ref 22–29)
EGFRCR SERPLBLD CKD-EPI 2021: 53 ML/MIN/1.73M2
ERYTHROCYTE [DISTWIDTH] IN BLOOD BY AUTOMATED COUNT: 13.8 % (ref 10–15)
GLUCOSE SERPL-MCNC: 122 MG/DL (ref 70–99)
GLUCOSE UR STRIP-MCNC: NEGATIVE MG/DL
HCT VFR BLD AUTO: 37.3 % (ref 35–47)
HGB BLD-MCNC: 12.3 G/DL (ref 11.7–15.7)
HGB UR QL STRIP: NEGATIVE
HYALINE CASTS: 16 /LPF
KETONES UR STRIP-MCNC: NEGATIVE MG/DL
LEUKOCYTE ESTERASE UR QL STRIP: ABNORMAL
MCH RBC QN AUTO: 32.5 PG (ref 26.5–33)
MCHC RBC AUTO-ENTMCNC: 33 G/DL (ref 31.5–36.5)
MCV RBC AUTO: 98 FL (ref 78–100)
MUCOUS THREADS #/AREA URNS LPF: PRESENT /LPF
NITRATE UR QL: NEGATIVE
PH UR STRIP: 6 [PH] (ref 5–7)
PLATELET # BLD AUTO: 234 10E3/UL (ref 150–450)
POTASSIUM SERPL-SCNC: 4.3 MMOL/L (ref 3.4–5.3)
PROT SERPL-MCNC: 6.8 G/DL (ref 6.4–8.3)
RBC # BLD AUTO: 3.79 10E6/UL (ref 3.8–5.2)
RBC URINE: <1 /HPF
SODIUM SERPL-SCNC: 142 MMOL/L (ref 135–145)
SP GR UR STRIP: 1.02 (ref 1–1.03)
SQUAMOUS EPITHELIAL: 1 /HPF
TRANSITIONAL EPI: <1 /HPF
UROBILINOGEN UR STRIP-MCNC: NORMAL MG/DL
WBC # BLD AUTO: 8.6 10E3/UL (ref 4–11)
WBC URINE: 22 /HPF

## 2024-06-17 PROCEDURE — 85027 COMPLETE CBC AUTOMATED: CPT | Mod: ORL | Performed by: INTERNAL MEDICINE

## 2024-06-17 PROCEDURE — P9604 ONE-WAY ALLOW PRORATED TRIP: HCPCS | Mod: ORL | Performed by: INTERNAL MEDICINE

## 2024-06-17 PROCEDURE — 87186 SC STD MICRODIL/AGAR DIL: CPT | Mod: ORL | Performed by: INTERNAL MEDICINE

## 2024-06-17 PROCEDURE — 87086 URINE CULTURE/COLONY COUNT: CPT | Mod: ORL | Performed by: INTERNAL MEDICINE

## 2024-06-17 PROCEDURE — 36415 COLL VENOUS BLD VENIPUNCTURE: CPT | Mod: ORL | Performed by: INTERNAL MEDICINE

## 2024-06-17 PROCEDURE — 81001 URINALYSIS AUTO W/SCOPE: CPT | Mod: ORL | Performed by: INTERNAL MEDICINE

## 2024-06-17 PROCEDURE — 80053 COMPREHEN METABOLIC PANEL: CPT | Mod: ORL | Performed by: INTERNAL MEDICINE

## 2024-06-18 LAB — BACTERIA UR CULT: ABNORMAL

## 2024-07-10 ENCOUNTER — LAB REQUISITION (OUTPATIENT)
Dept: LAB | Facility: CLINIC | Age: 89
End: 2024-07-10
Payer: MEDICARE

## 2024-07-10 DIAGNOSIS — R19.7 DIARRHEA, UNSPECIFIED: ICD-10-CM

## 2024-07-10 LAB — C DIFF TOX B STL QL: NEGATIVE

## 2024-07-10 PROCEDURE — 87493 C DIFF AMPLIFIED PROBE: CPT | Mod: ORL | Performed by: INTERNAL MEDICINE

## 2024-07-22 ENCOUNTER — LAB REQUISITION (OUTPATIENT)
Dept: LAB | Facility: CLINIC | Age: 89
End: 2024-07-22
Payer: MEDICARE

## 2024-07-22 DIAGNOSIS — R30.0 DYSURIA: ICD-10-CM

## 2024-07-22 LAB
ALBUMIN UR-MCNC: 30 MG/DL
APPEARANCE UR: ABNORMAL
BACTERIA #/AREA URNS HPF: ABNORMAL /HPF
BILIRUB UR QL STRIP: NEGATIVE
COLOR UR AUTO: YELLOW
GLUCOSE UR STRIP-MCNC: NEGATIVE MG/DL
HGB UR QL STRIP: ABNORMAL
HYALINE CASTS: 1 /LPF
KETONES UR STRIP-MCNC: NEGATIVE MG/DL
LEUKOCYTE ESTERASE UR QL STRIP: ABNORMAL
MUCOUS THREADS #/AREA URNS LPF: PRESENT /LPF
NITRATE UR QL: NEGATIVE
PH UR STRIP: 5.5 [PH] (ref 5–7)
RBC URINE: 1 /HPF
SP GR UR STRIP: 1.03 (ref 1–1.03)
SQUAMOUS EPITHELIAL: 1 /HPF
TRANSITIONAL EPI: <1 /HPF
UROBILINOGEN UR STRIP-MCNC: NORMAL MG/DL
WBC URINE: 89 /HPF

## 2024-07-22 PROCEDURE — 87086 URINE CULTURE/COLONY COUNT: CPT | Mod: ORL | Performed by: INTERNAL MEDICINE

## 2024-07-22 PROCEDURE — 81001 URINALYSIS AUTO W/SCOPE: CPT | Mod: ORL | Performed by: INTERNAL MEDICINE

## 2024-07-22 PROCEDURE — 87186 SC STD MICRODIL/AGAR DIL: CPT | Mod: ORL | Performed by: INTERNAL MEDICINE

## 2024-07-23 LAB — BACTERIA UR CULT: ABNORMAL

## 2024-07-29 ENCOUNTER — LAB REQUISITION (OUTPATIENT)
Dept: LAB | Facility: CLINIC | Age: 89
End: 2024-07-29
Payer: MEDICARE

## 2024-07-29 DIAGNOSIS — R30.0 DYSURIA: ICD-10-CM

## 2024-07-29 LAB
ALBUMIN UR-MCNC: 30 MG/DL
APPEARANCE UR: CLEAR
BILIRUB UR QL STRIP: NEGATIVE
CAOX CRY #/AREA URNS HPF: ABNORMAL /HPF
COLOR UR AUTO: YELLOW
GLUCOSE UR STRIP-MCNC: NEGATIVE MG/DL
HGB UR QL STRIP: NEGATIVE
HYALINE CASTS: 2 /LPF
KETONES UR STRIP-MCNC: NEGATIVE MG/DL
LEUKOCYTE ESTERASE UR QL STRIP: ABNORMAL
MUCOUS THREADS #/AREA URNS LPF: PRESENT /LPF
NITRATE UR QL: NEGATIVE
PH UR STRIP: 5.5 [PH] (ref 5–7)
RBC URINE: 1 /HPF
SP GR UR STRIP: 1.03 (ref 1–1.03)
SQUAMOUS EPITHELIAL: 2 /HPF
TRANSITIONAL EPI: 1 /HPF
UROBILINOGEN UR STRIP-MCNC: NORMAL MG/DL
WBC URINE: 23 /HPF

## 2024-07-29 PROCEDURE — 81001 URINALYSIS AUTO W/SCOPE: CPT | Mod: ORL

## 2024-08-04 ENCOUNTER — LAB REQUISITION (OUTPATIENT)
Dept: LAB | Facility: CLINIC | Age: 89
End: 2024-08-04
Payer: MEDICARE

## 2024-08-04 DIAGNOSIS — I10 ESSENTIAL (PRIMARY) HYPERTENSION: ICD-10-CM

## 2024-08-05 ENCOUNTER — LAB REQUISITION (OUTPATIENT)
Dept: LAB | Facility: CLINIC | Age: 89
End: 2024-08-05
Payer: MEDICARE

## 2024-08-05 DIAGNOSIS — R30.0 DYSURIA: ICD-10-CM

## 2024-08-05 LAB
ALBUMIN SERPL BCG-MCNC: 3.9 G/DL (ref 3.5–5.2)
ALBUMIN UR-MCNC: 50 MG/DL
ALP SERPL-CCNC: 89 U/L (ref 40–150)
ALT SERPL W P-5'-P-CCNC: 7 U/L (ref 0–50)
ANION GAP SERPL CALCULATED.3IONS-SCNC: 12 MMOL/L (ref 7–15)
APPEARANCE UR: CLEAR
AST SERPL W P-5'-P-CCNC: 31 U/L (ref 0–45)
BILIRUB SERPL-MCNC: 0.6 MG/DL
BILIRUB UR QL STRIP: NEGATIVE
BUN SERPL-MCNC: 10.7 MG/DL (ref 8–23)
CALCIUM SERPL-MCNC: 8.9 MG/DL (ref 8.8–10.4)
CHLORIDE SERPL-SCNC: 105 MMOL/L (ref 98–107)
COLOR UR AUTO: YELLOW
CREAT SERPL-MCNC: 0.83 MG/DL (ref 0.51–0.95)
EGFRCR SERPLBLD CKD-EPI 2021: 67 ML/MIN/1.73M2
ERYTHROCYTE [DISTWIDTH] IN BLOOD BY AUTOMATED COUNT: 14.5 % (ref 10–15)
GLUCOSE SERPL-MCNC: 106 MG/DL (ref 70–99)
GLUCOSE UR STRIP-MCNC: NEGATIVE MG/DL
HCO3 SERPL-SCNC: 24 MMOL/L (ref 22–29)
HCT VFR BLD AUTO: 37.1 % (ref 35–47)
HGB BLD-MCNC: 11.5 G/DL (ref 11.7–15.7)
HGB UR QL STRIP: ABNORMAL
HYALINE CASTS: 4 /LPF
KETONES UR STRIP-MCNC: NEGATIVE MG/DL
LEUKOCYTE ESTERASE UR QL STRIP: ABNORMAL
MCH RBC QN AUTO: 31.2 PG (ref 26.5–33)
MCHC RBC AUTO-ENTMCNC: 31 G/DL (ref 31.5–36.5)
MCV RBC AUTO: 101 FL (ref 78–100)
MUCOUS THREADS #/AREA URNS LPF: PRESENT /LPF
NITRATE UR QL: NEGATIVE
PH UR STRIP: 5.5 [PH] (ref 5–7)
PLATELET # BLD AUTO: 225 10E3/UL (ref 150–450)
POTASSIUM SERPL-SCNC: 3.7 MMOL/L (ref 3.4–5.3)
PROT SERPL-MCNC: 6.6 G/DL (ref 6.4–8.3)
RBC # BLD AUTO: 3.69 10E6/UL (ref 3.8–5.2)
RBC URINE: 4 /HPF
SODIUM SERPL-SCNC: 141 MMOL/L (ref 135–145)
SP GR UR STRIP: 1.03 (ref 1–1.03)
SQUAMOUS EPITHELIAL: 1 /HPF
UROBILINOGEN UR STRIP-MCNC: NORMAL MG/DL
WBC # BLD AUTO: 6.9 10E3/UL (ref 4–11)
WBC URINE: 19 /HPF

## 2024-08-05 PROCEDURE — P9604 ONE-WAY ALLOW PRORATED TRIP: HCPCS | Mod: ORL | Performed by: INTERNAL MEDICINE

## 2024-08-05 PROCEDURE — 81001 URINALYSIS AUTO W/SCOPE: CPT | Mod: ORL | Performed by: INTERNAL MEDICINE

## 2024-08-05 PROCEDURE — 80053 COMPREHEN METABOLIC PANEL: CPT | Mod: ORL | Performed by: INTERNAL MEDICINE

## 2024-08-05 PROCEDURE — 36415 COLL VENOUS BLD VENIPUNCTURE: CPT | Mod: ORL | Performed by: INTERNAL MEDICINE

## 2024-08-05 PROCEDURE — 87086 URINE CULTURE/COLONY COUNT: CPT | Mod: ORL | Performed by: INTERNAL MEDICINE

## 2024-08-05 PROCEDURE — 85027 COMPLETE CBC AUTOMATED: CPT | Mod: ORL | Performed by: INTERNAL MEDICINE

## 2024-08-06 ENCOUNTER — LAB REQUISITION (OUTPATIENT)
Dept: LAB | Facility: CLINIC | Age: 89
End: 2024-08-06
Payer: MEDICARE

## 2024-08-06 DIAGNOSIS — R30.0 DYSURIA: ICD-10-CM

## 2024-08-07 LAB — BACTERIA UR CULT: NORMAL

## 2024-09-05 ENCOUNTER — LAB REQUISITION (OUTPATIENT)
Dept: LAB | Facility: CLINIC | Age: 89
End: 2024-09-05
Payer: MEDICARE

## 2024-09-05 DIAGNOSIS — K90.0 CELIAC DISEASE: ICD-10-CM

## 2024-09-09 PROCEDURE — 36415 COLL VENOUS BLD VENIPUNCTURE: CPT | Mod: ORL | Performed by: INTERNAL MEDICINE

## 2024-09-09 PROCEDURE — 86364 TISS TRNSGLTMNASE EA IG CLAS: CPT | Mod: ORL | Performed by: INTERNAL MEDICINE

## 2024-09-09 PROCEDURE — P9604 ONE-WAY ALLOW PRORATED TRIP: HCPCS | Mod: ORL | Performed by: INTERNAL MEDICINE

## 2024-09-10 LAB — TTG IGA SER-ACNC: 0.3 U/ML

## 2024-10-01 ENCOUNTER — LAB REQUISITION (OUTPATIENT)
Dept: LAB | Facility: CLINIC | Age: 89
End: 2024-10-01
Payer: MEDICARE

## 2024-10-01 DIAGNOSIS — R30.0 DYSURIA: ICD-10-CM

## 2024-10-01 LAB
ALBUMIN UR-MCNC: NEGATIVE MG/DL
APPEARANCE UR: ABNORMAL
BACTERIA #/AREA URNS HPF: ABNORMAL /HPF
BILIRUB UR QL STRIP: NEGATIVE
CAOX CRY #/AREA URNS HPF: ABNORMAL /HPF
COLOR UR AUTO: ABNORMAL
GLUCOSE UR STRIP-MCNC: NEGATIVE MG/DL
HGB UR QL STRIP: NEGATIVE
HYALINE CASTS: 3 /LPF
KETONES UR STRIP-MCNC: NEGATIVE MG/DL
LEUKOCYTE ESTERASE UR QL STRIP: ABNORMAL
MUCOUS THREADS #/AREA URNS LPF: PRESENT /LPF
NITRATE UR QL: NEGATIVE
PH UR STRIP: 5.5 [PH] (ref 5–7)
RBC URINE: 1 /HPF
SP GR UR STRIP: 1.02 (ref 1–1.03)
TRANSITIONAL EPI: <1 /HPF
UROBILINOGEN UR STRIP-MCNC: NORMAL MG/DL
WBC URINE: 8 /HPF

## 2024-10-01 PROCEDURE — 87186 SC STD MICRODIL/AGAR DIL: CPT | Mod: ORL | Performed by: INTERNAL MEDICINE

## 2024-10-01 PROCEDURE — 81001 URINALYSIS AUTO W/SCOPE: CPT | Mod: ORL | Performed by: INTERNAL MEDICINE

## 2024-10-03 LAB — BACTERIA UR CULT: ABNORMAL

## 2024-10-14 ENCOUNTER — LAB REQUISITION (OUTPATIENT)
Dept: LAB | Facility: CLINIC | Age: 89
End: 2024-10-14
Payer: MEDICARE

## 2024-10-14 DIAGNOSIS — R30.0 DYSURIA: ICD-10-CM

## 2024-10-14 LAB
ALBUMIN UR-MCNC: NEGATIVE MG/DL
APPEARANCE UR: ABNORMAL
BACTERIA #/AREA URNS HPF: ABNORMAL /HPF
BILIRUB UR QL STRIP: NEGATIVE
CAOX CRY #/AREA URNS HPF: ABNORMAL /HPF
COLOR UR AUTO: ABNORMAL
GLUCOSE UR STRIP-MCNC: NEGATIVE MG/DL
HGB UR QL STRIP: NEGATIVE
HYALINE CASTS: 4 /LPF
KETONES UR STRIP-MCNC: NEGATIVE MG/DL
LEUKOCYTE ESTERASE UR QL STRIP: ABNORMAL
MUCOUS THREADS #/AREA URNS LPF: PRESENT /LPF
NITRATE UR QL: NEGATIVE
PH UR STRIP: 5.5 [PH] (ref 5–7)
RBC URINE: 1 /HPF
SP GR UR STRIP: 1.01 (ref 1–1.03)
SQUAMOUS EPITHELIAL: 1 /HPF
TRANSITIONAL EPI: <1 /HPF
UROBILINOGEN UR STRIP-MCNC: NORMAL MG/DL
WBC URINE: 8 /HPF

## 2024-10-14 PROCEDURE — 87086 URINE CULTURE/COLONY COUNT: CPT | Mod: ORL | Performed by: INTERNAL MEDICINE

## 2024-10-14 PROCEDURE — 81001 URINALYSIS AUTO W/SCOPE: CPT | Mod: ORL | Performed by: INTERNAL MEDICINE

## 2024-10-15 LAB — BACTERIA UR CULT: NORMAL

## 2024-11-13 ENCOUNTER — LAB REQUISITION (OUTPATIENT)
Dept: LAB | Facility: CLINIC | Age: 89
End: 2024-11-13
Payer: MEDICARE

## 2024-11-13 DIAGNOSIS — R30.0 DYSURIA: ICD-10-CM

## 2024-11-13 LAB
ALBUMIN UR-MCNC: NEGATIVE MG/DL
APPEARANCE UR: ABNORMAL
BACTERIA #/AREA URNS HPF: ABNORMAL /HPF
BILIRUB UR QL STRIP: NEGATIVE
COLOR UR AUTO: ABNORMAL
GLUCOSE UR STRIP-MCNC: NEGATIVE MG/DL
HGB UR QL STRIP: NEGATIVE
HYALINE CASTS: 6 /LPF
KETONES UR STRIP-MCNC: NEGATIVE MG/DL
LEUKOCYTE ESTERASE UR QL STRIP: ABNORMAL
MUCOUS THREADS #/AREA URNS LPF: PRESENT /LPF
NITRATE UR QL: NEGATIVE
PH UR STRIP: 5.5 [PH] (ref 5–7)
RBC URINE: 1 /HPF
SP GR UR STRIP: 1.01 (ref 1–1.03)
SQUAMOUS EPITHELIAL: 1 /HPF
UROBILINOGEN UR STRIP-MCNC: NORMAL MG/DL
WBC URINE: 32 /HPF
YEAST #/AREA URNS HPF: ABNORMAL /HPF

## 2024-11-13 PROCEDURE — 87186 SC STD MICRODIL/AGAR DIL: CPT | Mod: ORL | Performed by: INTERNAL MEDICINE

## 2024-11-13 PROCEDURE — 81001 URINALYSIS AUTO W/SCOPE: CPT | Mod: ORL | Performed by: INTERNAL MEDICINE

## 2024-11-15 LAB
BACTERIA UR CULT: ABNORMAL
BACTERIA UR CULT: ABNORMAL

## 2024-12-18 ENCOUNTER — LAB REQUISITION (OUTPATIENT)
Dept: LAB | Facility: CLINIC | Age: 89
End: 2024-12-18
Payer: MEDICARE

## 2024-12-18 DIAGNOSIS — R30.0 DYSURIA: ICD-10-CM

## 2024-12-18 LAB
ALBUMIN UR-MCNC: NEGATIVE MG/DL
APPEARANCE UR: CLEAR
BILIRUB UR QL STRIP: NEGATIVE
COLOR UR AUTO: ABNORMAL
GLUCOSE UR STRIP-MCNC: NEGATIVE MG/DL
HGB UR QL STRIP: NEGATIVE
HYALINE CASTS: 8 /LPF
KETONES UR STRIP-MCNC: NEGATIVE MG/DL
LEUKOCYTE ESTERASE UR QL STRIP: ABNORMAL
MUCOUS THREADS #/AREA URNS LPF: PRESENT /LPF
NITRATE UR QL: NEGATIVE
PH UR STRIP: 5 [PH] (ref 5–7)
RBC URINE: 4 /HPF
SP GR UR STRIP: 1.01 (ref 1–1.03)
SQUAMOUS EPITHELIAL: <1 /HPF
UROBILINOGEN UR STRIP-MCNC: NORMAL MG/DL
WBC URINE: 6 /HPF

## 2024-12-18 PROCEDURE — 81001 URINALYSIS AUTO W/SCOPE: CPT | Mod: ORL | Performed by: INTERNAL MEDICINE

## 2024-12-18 PROCEDURE — 87086 URINE CULTURE/COLONY COUNT: CPT | Mod: ORL | Performed by: INTERNAL MEDICINE

## 2024-12-19 LAB — BACTERIA UR CULT: NORMAL

## 2024-12-20 LAB — BACTERIA UR CULT: NORMAL

## 2025-02-19 ENCOUNTER — TRANSFERRED RECORDS (OUTPATIENT)
Dept: HEALTH INFORMATION MANAGEMENT | Facility: CLINIC | Age: OVER 89
End: 2025-02-19

## 2025-03-20 ENCOUNTER — LAB REQUISITION (OUTPATIENT)
Dept: LAB | Facility: CLINIC | Age: OVER 89
End: 2025-03-20
Payer: MEDICARE

## 2025-03-20 DIAGNOSIS — R30.0 DYSURIA: ICD-10-CM

## 2025-03-20 LAB
ALBUMIN UR-MCNC: 20 MG/DL
APPEARANCE UR: ABNORMAL
BILIRUB UR QL STRIP: NEGATIVE
COLOR UR AUTO: YELLOW
GLUCOSE UR STRIP-MCNC: NEGATIVE MG/DL
HGB UR QL STRIP: ABNORMAL
HYALINE CASTS: 10 /LPF
KETONES UR STRIP-MCNC: NEGATIVE MG/DL
LEUKOCYTE ESTERASE UR QL STRIP: ABNORMAL
MUCOUS THREADS #/AREA URNS LPF: PRESENT /LPF
NITRATE UR QL: POSITIVE
PH UR STRIP: 5.5 [PH] (ref 5–7)
RBC URINE: 1 /HPF
SP GR UR STRIP: 1.02 (ref 1–1.03)
UROBILINOGEN UR STRIP-MCNC: NORMAL MG/DL
WBC URINE: 25 /HPF

## 2025-03-20 PROCEDURE — 87186 SC STD MICRODIL/AGAR DIL: CPT | Mod: ORL | Performed by: INTERNAL MEDICINE

## 2025-03-20 PROCEDURE — 87086 URINE CULTURE/COLONY COUNT: CPT | Mod: ORL | Performed by: INTERNAL MEDICINE

## 2025-03-20 PROCEDURE — 81001 URINALYSIS AUTO W/SCOPE: CPT | Mod: ORL | Performed by: INTERNAL MEDICINE

## 2025-03-24 ENCOUNTER — APPOINTMENT (OUTPATIENT)
Dept: RADIOLOGY | Facility: HOSPITAL | Age: OVER 89
End: 2025-03-24
Attending: EMERGENCY MEDICINE
Payer: MEDICARE

## 2025-03-24 ENCOUNTER — HOSPITAL ENCOUNTER (INPATIENT)
Facility: HOSPITAL | Age: OVER 89
LOS: 2 days | Discharge: HOME | End: 2025-03-26
Attending: EMERGENCY MEDICINE | Admitting: FAMILY MEDICINE
Payer: MEDICARE

## 2025-03-24 ENCOUNTER — APPOINTMENT (OUTPATIENT)
Dept: CT IMAGING | Facility: HOSPITAL | Age: OVER 89
End: 2025-03-24
Attending: EMERGENCY MEDICINE
Payer: MEDICARE

## 2025-03-24 ENCOUNTER — APPOINTMENT (OUTPATIENT)
Dept: NUCLEAR MEDICINE | Facility: HOSPITAL | Age: OVER 89
End: 2025-03-24
Attending: EMERGENCY MEDICINE
Payer: MEDICARE

## 2025-03-24 DIAGNOSIS — J96.01 ACUTE RESPIRATORY FAILURE WITH HYPOXIA (H): ICD-10-CM

## 2025-03-24 DIAGNOSIS — N30.01 ACUTE CYSTITIS WITH HEMATURIA: ICD-10-CM

## 2025-03-24 DIAGNOSIS — I50.9 CONGESTIVE HEART FAILURE, UNSPECIFIED HF CHRONICITY, UNSPECIFIED HEART FAILURE TYPE (H): ICD-10-CM

## 2025-03-24 DIAGNOSIS — R09.02 HYPOXIA: Primary | ICD-10-CM

## 2025-03-24 DIAGNOSIS — I50.33 ACUTE ON CHRONIC DIASTOLIC CONGESTIVE HEART FAILURE (H): ICD-10-CM

## 2025-03-24 DIAGNOSIS — M62.81 GENERALIZED MUSCLE WEAKNESS: ICD-10-CM

## 2025-03-24 LAB
ALBUMIN SERPL BCG-MCNC: 3.9 G/DL (ref 3.5–5.2)
ALBUMIN UR-MCNC: 30 MG/DL
ALP SERPL-CCNC: 108 U/L (ref 40–150)
ALT SERPL W P-5'-P-CCNC: 7 U/L (ref 0–50)
ANION GAP SERPL CALCULATED.3IONS-SCNC: 10 MMOL/L (ref 7–15)
APPEARANCE UR: CLEAR
AST SERPL W P-5'-P-CCNC: 26 U/L (ref 0–45)
BACTERIA #/AREA URNS HPF: ABNORMAL /HPF
BACTERIA UR CULT: ABNORMAL
BASOPHILS # BLD AUTO: 0 10E3/UL (ref 0–0.2)
BASOPHILS NFR BLD AUTO: 0 %
BILIRUB SERPL-MCNC: 0.8 MG/DL
BILIRUB UR QL STRIP: NEGATIVE
BUN SERPL-MCNC: 16.7 MG/DL (ref 8–23)
CALCIUM SERPL-MCNC: 9 MG/DL (ref 8.8–10.4)
CHLORIDE SERPL-SCNC: 104 MMOL/L (ref 98–107)
COLOR UR AUTO: ABNORMAL
CREAT SERPL-MCNC: 0.94 MG/DL (ref 0.51–0.95)
D DIMER PPP FEU-MCNC: 1.01 UG/ML FEU (ref 0–0.5)
EGFRCR SERPLBLD CKD-EPI 2021: 57 ML/MIN/1.73M2
EOSINOPHIL # BLD AUTO: 0.1 10E3/UL (ref 0–0.7)
EOSINOPHIL NFR BLD AUTO: 1 %
ERYTHROCYTE [DISTWIDTH] IN BLOOD BY AUTOMATED COUNT: 12.7 % (ref 10–15)
FLUAV RNA SPEC QL NAA+PROBE: NEGATIVE
FLUBV RNA RESP QL NAA+PROBE: NEGATIVE
GLUCOSE SERPL-MCNC: 115 MG/DL (ref 70–99)
GLUCOSE UR STRIP-MCNC: NEGATIVE MG/DL
HCO3 SERPL-SCNC: 28 MMOL/L (ref 22–29)
HCT VFR BLD AUTO: 37.5 % (ref 35–47)
HGB BLD-MCNC: 12.2 G/DL (ref 11.7–15.7)
HGB UR QL STRIP: ABNORMAL
HOLD SPECIMEN: NORMAL
IMM GRANULOCYTES # BLD: 0.1 10E3/UL
IMM GRANULOCYTES NFR BLD: 1 %
KETONES UR STRIP-MCNC: NEGATIVE MG/DL
LEUKOCYTE ESTERASE UR QL STRIP: ABNORMAL
LYMPHOCYTES # BLD AUTO: 0.6 10E3/UL (ref 0.8–5.3)
LYMPHOCYTES NFR BLD AUTO: 8 %
MAGNESIUM SERPL-MCNC: 1.7 MG/DL (ref 1.7–2.3)
MCH RBC QN AUTO: 30.9 PG (ref 26.5–33)
MCHC RBC AUTO-ENTMCNC: 32.5 G/DL (ref 31.5–36.5)
MCV RBC AUTO: 95 FL (ref 78–100)
MONOCYTES # BLD AUTO: 0.3 10E3/UL (ref 0–1.3)
MONOCYTES NFR BLD AUTO: 4 %
NEUTROPHILS # BLD AUTO: 6.3 10E3/UL (ref 1.6–8.3)
NEUTROPHILS NFR BLD AUTO: 85 %
NITRATE UR QL: POSITIVE
NRBC # BLD AUTO: 0 10E3/UL
NRBC BLD AUTO-RTO: 0 /100
NT-PROBNP SERPL-MCNC: 2555 PG/ML (ref 0–1800)
PH UR STRIP: 5.5 [PH] (ref 5–7)
PLATELET # BLD AUTO: 271 10E3/UL (ref 150–450)
POTASSIUM SERPL-SCNC: 4 MMOL/L (ref 3.4–5.3)
PROCALCITONIN SERPL IA-MCNC: 0.15 NG/ML
PROT SERPL-MCNC: 7.4 G/DL (ref 6.4–8.3)
RBC # BLD AUTO: 3.95 10E6/UL (ref 3.8–5.2)
RBC URINE: 1 /HPF
RSV RNA SPEC NAA+PROBE: NEGATIVE
SARS-COV-2 RNA RESP QL NAA+PROBE: NEGATIVE
SODIUM SERPL-SCNC: 142 MMOL/L (ref 135–145)
SP GR UR STRIP: 1.01 (ref 1–1.03)
SQUAMOUS EPITHELIAL: <1 /HPF
TROPONIN T SERPL HS-MCNC: 32 NG/L
TROPONIN T SERPL HS-MCNC: 33 NG/L
UROBILINOGEN UR STRIP-MCNC: NORMAL MG/DL
WBC # BLD AUTO: 7.4 10E3/UL (ref 4–11)
WBC URINE: 13 /HPF

## 2025-03-24 PROCEDURE — 84145 PROCALCITONIN (PCT): CPT | Performed by: EMERGENCY MEDICINE

## 2025-03-24 PROCEDURE — 87637 SARSCOV2&INF A&B&RSV AMP PRB: CPT | Performed by: EMERGENCY MEDICINE

## 2025-03-24 PROCEDURE — 343N000001 HC RX 343 MED OP 636: Performed by: EMERGENCY MEDICINE

## 2025-03-24 PROCEDURE — 83735 ASSAY OF MAGNESIUM: CPT | Performed by: EMERGENCY MEDICINE

## 2025-03-24 PROCEDURE — 258N000003 HC RX IP 258 OP 636: Performed by: EMERGENCY MEDICINE

## 2025-03-24 PROCEDURE — 80053 COMPREHEN METABOLIC PANEL: CPT | Performed by: EMERGENCY MEDICINE

## 2025-03-24 PROCEDURE — 250N000011 HC RX IP 250 OP 636: Performed by: EMERGENCY MEDICINE

## 2025-03-24 PROCEDURE — 70450 CT HEAD/BRAIN W/O DYE: CPT

## 2025-03-24 PROCEDURE — 93005 ELECTROCARDIOGRAM TRACING: CPT | Performed by: EMERGENCY MEDICINE

## 2025-03-24 PROCEDURE — 81001 URINALYSIS AUTO W/SCOPE: CPT | Performed by: EMERGENCY MEDICINE

## 2025-03-24 PROCEDURE — 99223 1ST HOSP IP/OBS HIGH 75: CPT | Mod: AI

## 2025-03-24 PROCEDURE — 78582 LUNG VENTILAT&PERFUS IMAGING: CPT

## 2025-03-24 PROCEDURE — 85025 COMPLETE CBC W/AUTO DIFF WBC: CPT | Performed by: EMERGENCY MEDICINE

## 2025-03-24 PROCEDURE — 250N000011 HC RX IP 250 OP 636

## 2025-03-24 PROCEDURE — 99285 EMERGENCY DEPT VISIT HI MDM: CPT | Mod: 25

## 2025-03-24 PROCEDURE — 87086 URINE CULTURE/COLONY COUNT: CPT | Performed by: EMERGENCY MEDICINE

## 2025-03-24 PROCEDURE — 83880 ASSAY OF NATRIURETIC PEPTIDE: CPT | Performed by: EMERGENCY MEDICINE

## 2025-03-24 PROCEDURE — 85379 FIBRIN DEGRADATION QUANT: CPT | Performed by: EMERGENCY MEDICINE

## 2025-03-24 PROCEDURE — 120N000004 HC R&B MS OVERFLOW

## 2025-03-24 PROCEDURE — 96374 THER/PROPH/DIAG INJ IV PUSH: CPT

## 2025-03-24 PROCEDURE — 71046 X-RAY EXAM CHEST 2 VIEWS: CPT

## 2025-03-24 PROCEDURE — A9540 TC99M MAA: HCPCS | Performed by: EMERGENCY MEDICINE

## 2025-03-24 PROCEDURE — A9567 TECHNETIUM TC-99M AEROSOL: HCPCS | Performed by: EMERGENCY MEDICINE

## 2025-03-24 PROCEDURE — 272N000035 NM LUNG SCAN VENTILATION AND PERFUSION

## 2025-03-24 PROCEDURE — 36415 COLL VENOUS BLD VENIPUNCTURE: CPT | Performed by: EMERGENCY MEDICINE

## 2025-03-24 PROCEDURE — 84484 ASSAY OF TROPONIN QUANT: CPT | Performed by: EMERGENCY MEDICINE

## 2025-03-24 RX ORDER — NYSTATIN 100000 [USP'U]/G
POWDER TOPICAL 2 TIMES DAILY PRN
Status: ON HOLD | COMMUNITY

## 2025-03-24 RX ORDER — CALCIUM CARBONATE 500 MG/1
1000 TABLET, CHEWABLE ORAL 4 TIMES DAILY PRN
Status: DISCONTINUED | OUTPATIENT
Start: 2025-03-24 | End: 2025-03-26 | Stop reason: HOSPADM

## 2025-03-24 RX ORDER — ACETAMINOPHEN 650 MG/1
650 SUPPOSITORY RECTAL EVERY 4 HOURS PRN
Status: DISCONTINUED | OUTPATIENT
Start: 2025-03-24 | End: 2025-03-26 | Stop reason: HOSPADM

## 2025-03-24 RX ORDER — ONDANSETRON 4 MG/1
4 TABLET, ORALLY DISINTEGRATING ORAL EVERY 6 HOURS PRN
Status: DISCONTINUED | OUTPATIENT
Start: 2025-03-24 | End: 2025-03-26 | Stop reason: HOSPADM

## 2025-03-24 RX ORDER — ALENDRONATE SODIUM 70 MG/1
70 TABLET ORAL
Status: ON HOLD | COMMUNITY

## 2025-03-24 RX ORDER — ACETAMINOPHEN 325 MG/1
650 TABLET ORAL EVERY 4 HOURS PRN
Status: DISCONTINUED | OUTPATIENT
Start: 2025-03-24 | End: 2025-03-26 | Stop reason: HOSPADM

## 2025-03-24 RX ORDER — FUROSEMIDE 10 MG/ML
60 INJECTION INTRAMUSCULAR; INTRAVENOUS ONCE
Status: COMPLETED | OUTPATIENT
Start: 2025-03-24 | End: 2025-03-24

## 2025-03-24 RX ORDER — LIDOCAINE 40 MG/G
CREAM TOPICAL
Status: DISCONTINUED | OUTPATIENT
Start: 2025-03-24 | End: 2025-03-26 | Stop reason: HOSPADM

## 2025-03-24 RX ORDER — ONDANSETRON 2 MG/ML
4 INJECTION INTRAMUSCULAR; INTRAVENOUS EVERY 6 HOURS PRN
Status: DISCONTINUED | OUTPATIENT
Start: 2025-03-24 | End: 2025-03-26 | Stop reason: HOSPADM

## 2025-03-24 RX ORDER — CEFTRIAXONE 1 G/1
1 INJECTION, POWDER, FOR SOLUTION INTRAMUSCULAR; INTRAVENOUS EVERY 24 HOURS
Status: DISCONTINUED | OUTPATIENT
Start: 2025-03-25 | End: 2025-03-26 | Stop reason: HOSPADM

## 2025-03-24 RX ORDER — FEXOFENADINE HCL 180 MG/1
180 TABLET ORAL DAILY PRN
Status: DISCONTINUED | OUTPATIENT
Start: 2025-03-24 | End: 2025-03-26 | Stop reason: HOSPADM

## 2025-03-24 RX ORDER — BUSPIRONE HYDROCHLORIDE 10 MG/1
10 TABLET ORAL 2 TIMES DAILY
Status: DISCONTINUED | OUTPATIENT
Start: 2025-03-24 | End: 2025-03-26 | Stop reason: HOSPADM

## 2025-03-24 RX ORDER — LOPERAMIDE HYDROCHLORIDE 2 MG/1
2 CAPSULE ORAL 3 TIMES DAILY PRN
Status: DISCONTINUED | OUTPATIENT
Start: 2025-03-24 | End: 2025-03-26 | Stop reason: HOSPADM

## 2025-03-24 RX ORDER — POTASSIUM CHLORIDE 750 MG/1
10 TABLET, EXTENDED RELEASE ORAL 2 TIMES DAILY
Status: DISCONTINUED | OUTPATIENT
Start: 2025-03-24 | End: 2025-03-26 | Stop reason: HOSPADM

## 2025-03-24 RX ORDER — FUROSEMIDE 20 MG/1
40 TABLET ORAL DAILY
Status: DISCONTINUED | OUTPATIENT
Start: 2025-03-25 | End: 2025-03-26 | Stop reason: HOSPADM

## 2025-03-24 RX ORDER — ENOXAPARIN SODIUM 100 MG/ML
40 INJECTION SUBCUTANEOUS EVERY 24 HOURS
Status: DISCONTINUED | OUTPATIENT
Start: 2025-03-24 | End: 2025-03-26 | Stop reason: HOSPADM

## 2025-03-24 RX ORDER — AMOXICILLIN 250 MG
2 CAPSULE ORAL 2 TIMES DAILY PRN
Status: DISCONTINUED | OUTPATIENT
Start: 2025-03-24 | End: 2025-03-26 | Stop reason: HOSPADM

## 2025-03-24 RX ORDER — MAGNESIUM HYDROXIDE/ALUMINUM HYDROXICE/SIMETHICONE 120; 1200; 1200 MG/30ML; MG/30ML; MG/30ML
15 SUSPENSION ORAL
Status: DISCONTINUED | OUTPATIENT
Start: 2025-03-24 | End: 2025-03-26 | Stop reason: HOSPADM

## 2025-03-24 RX ORDER — MIRTAZAPINE 7.5 MG/1
7.5 TABLET, FILM COATED ORAL AT BEDTIME
Status: ON HOLD | COMMUNITY

## 2025-03-24 RX ORDER — PROCHLORPERAZINE MALEATE 5 MG/1
5 TABLET ORAL EVERY 6 HOURS PRN
Status: DISCONTINUED | OUTPATIENT
Start: 2025-03-24 | End: 2025-03-26 | Stop reason: HOSPADM

## 2025-03-24 RX ORDER — GABAPENTIN 100 MG/1
100 CAPSULE ORAL 2 TIMES DAILY
Status: DISCONTINUED | OUTPATIENT
Start: 2025-03-24 | End: 2025-03-26 | Stop reason: HOSPADM

## 2025-03-24 RX ORDER — POTASSIUM CHLORIDE 750 MG/1
10 CAPSULE, EXTENDED RELEASE ORAL 2 TIMES DAILY
Status: DISCONTINUED | OUTPATIENT
Start: 2025-03-24 | End: 2025-03-24

## 2025-03-24 RX ORDER — MIRTAZAPINE 7.5 MG/1
7.5 TABLET, FILM COATED ORAL AT BEDTIME
Status: DISCONTINUED | OUTPATIENT
Start: 2025-03-24 | End: 2025-03-26 | Stop reason: HOSPADM

## 2025-03-24 RX ORDER — ZINC OXIDE 20 %
OINTMENT (GRAM) TOPICAL 2 TIMES DAILY PRN
Status: ON HOLD | COMMUNITY

## 2025-03-24 RX ORDER — BUSPIRONE HYDROCHLORIDE 10 MG/1
10 TABLET ORAL 2 TIMES DAILY
Status: ON HOLD | COMMUNITY

## 2025-03-24 RX ORDER — AMOXICILLIN 250 MG
1 CAPSULE ORAL 2 TIMES DAILY PRN
Status: DISCONTINUED | OUTPATIENT
Start: 2025-03-24 | End: 2025-03-26 | Stop reason: HOSPADM

## 2025-03-24 RX ORDER — CEFTRIAXONE 1 G/1
1 INJECTION, POWDER, FOR SOLUTION INTRAMUSCULAR; INTRAVENOUS ONCE
Status: COMPLETED | OUTPATIENT
Start: 2025-03-24 | End: 2025-03-24

## 2025-03-24 RX ORDER — GABAPENTIN 100 MG/1
100 CAPSULE ORAL 2 TIMES DAILY
Status: ON HOLD | COMMUNITY

## 2025-03-24 RX ORDER — FEXOFENADINE HCL 180 MG/1
180 TABLET ORAL DAILY PRN
Status: ON HOLD | COMMUNITY

## 2025-03-24 RX ORDER — KETOCONAZOLE 20 MG/G
CREAM TOPICAL 2 TIMES DAILY PRN
Status: ON HOLD | COMMUNITY

## 2025-03-24 RX ORDER — VITAMIN B COMPLEX
25 TABLET ORAL DAILY
Status: DISCONTINUED | OUTPATIENT
Start: 2025-03-24 | End: 2025-03-26 | Stop reason: HOSPADM

## 2025-03-24 RX ADMIN — PANTOPRAZOLE SODIUM 40 MG: 40 INJECTION, POWDER, FOR SOLUTION INTRAVENOUS at 23:05

## 2025-03-24 RX ADMIN — ENOXAPARIN SODIUM 40 MG: 40 INJECTION SUBCUTANEOUS at 23:05

## 2025-03-24 RX ADMIN — KIT FOR THE PREPARATION OF TECHNETIUM TC 99M ALBUMIN AGGREGATED 8.3 MILLICURIE: 2.5 INJECTION, POWDER, FOR SOLUTION INTRAVENOUS at 12:56

## 2025-03-24 RX ADMIN — AZITHROMYCIN MONOHYDRATE 500 MG: 500 INJECTION, POWDER, LYOPHILIZED, FOR SOLUTION INTRAVENOUS at 13:35

## 2025-03-24 RX ADMIN — FUROSEMIDE 60 MG: 10 INJECTION, SOLUTION INTRAMUSCULAR; INTRAVENOUS at 11:04

## 2025-03-24 RX ADMIN — ONDANSETRON 4 MG: 2 INJECTION, SOLUTION INTRAMUSCULAR; INTRAVENOUS at 19:12

## 2025-03-24 RX ADMIN — KIT FOR THE PREPARATION OF TECHNETIUM TC 99M PENTETATE 61.4 MILLICURIE: 20 INJECTION, POWDER, LYOPHILIZED, FOR SOLUTION INTRAVENOUS; RESPIRATORY (INHALATION) at 12:36

## 2025-03-24 RX ADMIN — CEFTRIAXONE SODIUM 1 G: 1 INJECTION, POWDER, FOR SOLUTION INTRAMUSCULAR; INTRAVENOUS at 12:06

## 2025-03-24 ASSESSMENT — ACTIVITIES OF DAILY LIVING (ADL)
ADLS_ACUITY_SCORE: 41
DEPENDENT_IADLS:: CLEANING;COOKING;LAUNDRY;SHOPPING;MEDICATION MANAGEMENT;TRANSPORTATION;MONEY MANAGEMENT
ADLS_ACUITY_SCORE: 41
ADLS_ACUITY_SCORE: 41
ADLS_ACUITY_SCORE: 50
ADLS_ACUITY_SCORE: 50
ADLS_ACUITY_SCORE: 41
ADLS_ACUITY_SCORE: 41
ADLS_ACUITY_SCORE: 50
ADLS_ACUITY_SCORE: 45
ADLS_ACUITY_SCORE: 41
ADLS_ACUITY_SCORE: 41
ADLS_ACUITY_SCORE: 50

## 2025-03-24 ASSESSMENT — ENCOUNTER SYMPTOMS
SHORTNESS OF BREATH: 1
CHILLS: 1
DYSURIA: 1
CONFUSION: 1

## 2025-03-24 ASSESSMENT — COLUMBIA-SUICIDE SEVERITY RATING SCALE - C-SSRS
1. IN THE PAST MONTH, HAVE YOU WISHED YOU WERE DEAD OR WISHED YOU COULD GO TO SLEEP AND NOT WAKE UP?: NO
6. HAVE YOU EVER DONE ANYTHING, STARTED TO DO ANYTHING, OR PREPARED TO DO ANYTHING TO END YOUR LIFE?: NO
2. HAVE YOU ACTUALLY HAD ANY THOUGHTS OF KILLING YOURSELF IN THE PAST MONTH?: NO

## 2025-03-24 NOTE — PHARMACY-ADMISSION MEDICATION HISTORY
Pharmacist Admission Medication History    Admission medication history is complete. The information provided in this note is only as accurate as the sources available at the time of the update.    Information Source(s): Facility ([facility]/) medication list/MAR via N/A    Pertinent Information: None    Changes made to PTA medication list:  Added: Buspar, Alendronate, gabapentin, systane eye drops, mirtazapine, several PRNs (Allergra, ketoconazole, nystatin pow, zinc oxide)  Deleted: Simvastatin, psyllium, zofran, lorazepam, losartan, omeprazole  Changed: Lasix increased from 20mg to 40mg daily, potassium decreased from 20mEq BID to 10mEq BID, sertraline increased from 50mg to 150mg daily.     Allergies reviewed with patient and updates made in EHR: yes    Medication History Completed By: Cachorro Moyer RPH 3/24/2025 9:48 AM    PTA Med List   Medication Sig Last Dose/Taking    acetaminophen (TYLENOL) 325 MG tablet Take 325 mg by mouth every 4 hours as needed for mild pain 3/23/2025 Evening    alendronate (FOSAMAX) 70 MG tablet Take 70 mg by mouth every 7 days. 3/22/2025    busPIRone (BUSPAR) 10 MG tablet Take 10 mg by mouth 2 times daily. 3/23/2025 Evening    cholecalciferol (VITAMIN D3) 25 mcg (1000 units) capsule Take 1 capsule by mouth daily. 3/23/2025    fexofenadine (ALLEGRA) 180 MG tablet Take 180 mg by mouth daily as needed for allergies. Past Week    furosemide (LASIX) 20 MG tablet Take 40 mg by mouth daily. 3/23/2025 Morning    gabapentin (NEURONTIN) 100 MG capsule Take 100 mg by mouth 2 times daily. 3/23/2025 Evening    ketoconazole (NIZORAL) 2 % external cream Apply topically daily as needed for itching. Past Month    loperamide (IMODIUM) 2 MG capsule Take 2 mg by mouth 3 times daily as needed for diarrhea 3/23/2025 Morning    mirtazapine (REMERON) 7.5 MG tablet Take 7.5 mg by mouth at bedtime. 3/23/2025 Bedtime    nystatin (MYCOSTATIN) 281934 UNIT/GM external powder Apply topically 2 times daily as  needed for dry skin. Past Month    polyethylene glycol-propylene glycol (SYSTANE ULTRA) 0.4-0.3 % SOLN ophthalmic solution Place 1 drop into both eyes 2 times daily as needed for dry eyes. 3/23/2025 Evening    potassium chloride ER (MICRO-K) 10 MEQ CR capsule Take 10 mEq by mouth 2 times daily. 3/23/2025 Evening    sertraline (ZOLOFT) 100 MG tablet Take 150 mg by mouth daily. 3/23/2025 Morning    zinc oxide (DESITIN) 20 % external ointment Apply topically as needed for dry skin or irritation. Past Month

## 2025-03-24 NOTE — CONSULTS
Care Management Initial Consult    General Information  Assessment completed with: Patient, Children, Marybeth and daughter Carmella  Type of CM/SW Visit: Initial Assessment    Primary Care Provider verified and updated as needed: Yes   Readmission within the last 30 days: no previous admission in last 30 days      Reason for Consult: discharge planning  Advance Care Planning: Advance Care Planning Reviewed: no concerns identified          Communication Assessment  Patient's communication style: spoken language (English or Bilingual)             Cognitive  Cognitive/Neuro/Behavioral: WDL                      Living Environment:   People in home: alone     Current living Arrangements: apartment, assisted living  [facility] of Facility: [facility]   Able to return to prior arrangements: yes       Family/Social Support:  Care provided by: self, child(rema), other (see comments) (facility staff)  Provides care for: no one, unable/limited ability to care for self  Marital Status:   Support system: Children, Facility resident(s)/Staff          Description of Support System: Supportive, Involved    Support Assessment: Adequate family and caregiver support, Patient communicates needs well met    Current Resources:   Patient receiving home care services: No        Community Resources: Other (see comment) (home PT (private pay))  Equipment currently used at home: wheelchair, manual, grab bar, toilet, grab bar, tub/shower  Supplies currently used at home: Compression Stockings    Employment/Financial:  Employment Status: retired        Financial Concerns: none   Referral to Financial Worker: No       Does the patient's insurance plan have a 3 day qualifying hospital stay waiver?  No    Lifestyle & Psychosocial Needs:  Social Drivers of Health     Food Insecurity: Not on file   Depression: Not at risk (9/15/2021)    [facility]    PHQ-2     PHQ-2 TOTAL SCORE: 2    Housing Stability: Not on file   Tobacco Use: Low Risk  (7/31/2024)    [facility]    Patient History     Smoking Tobacco Use: Never     Smokeless Tobacco Use: Never     Passive Exposure: Not on file   Financial Resource Strain: High Risk (12/22/2021)    [facility]    Financial Resource Strain     Difficulty of Paying Living Expenses: Not on file     Difficulty of Paying Living Expenses: Not on file   Alcohol Use: Not on file   Transportation Needs: Not on file   Physical Activity: Not on file   Interpersonal Safety: Not on file   Stress: Not on file   Social Connections: Unknown (12/22/2021)    [facility]    Social Connections     Frequency of Communication with Friends and Family: Not on file   Health Literacy: Not on file       Functional Status:  Prior to admission patient needed assistance:   Dependent ADLs:: Wheelchair-independent, Bathing, Dressing, Toileting  Dependent IADLs:: Cleaning, Cooking, Laundry, Shopping, Medication Management, Transportation, Money Management       Mental Health Status:  Mental Health Status: No Current Concerns       Chemical Dependency Status:  Chemical Dependency Status: No Current Concerns             Values/Beliefs:  Spiritual, Cultural Beliefs, Restoration Practices, Values that affect care: no               Discussed  Partnership in Safe Discharge Planning  document with patient/family: No    Additional Information:  CM consult received for discharge planning.  Met with patient and daughter Carmella at bedside to introduce CM role and perform initial assessment.  Demographics verified and updated as needed.  Maranda Castro lives at [facility] in assisted living.  Uses a manual wheelchair for mobility.  Assisted living facility staff assist with bathing, dressing, toileting, meals, cleaning, laundry, and medications.  Patient  has 4 children in the area and they assist with transportation.  Carmella reports she visits mom 2-3 times per week as well.  Patient has been receiving private pay personal training once per week but that may be ending soon per daughter.  Plans to return to [facility] at discharge.  Has done skilled home care PT and OT at [facility] in the past but this ended as patient was no longer progressing.  Family can transport at discharge.  Left message on nurse line at [facility] to update them on plan for admission.    Contacts:  Daughter Carmella, ph [phone]  [facility], ph [phone]  [facility] RN complex care manager [place], ph [phone]  [facility] pharmacy, ph [phone]        Next Steps: CM to follow for medical progression of care, discharge recommendations, and final discharge plan.      Guido Chase CRNI       Render Risk Assessment In Note?: yes Detail Level: Zone Note Text (......Xxx Chief Complaint.): This diagnosis correlates with the

## 2025-03-24 NOTE — ED TRIAGE NOTES
Pt coming from assisted living. At 0300 Pt awoke with SOB. Staff noted Pt had increased and irregular heart rate, and Pt has no Hx of A-fib. Staff also noted Pt's O2 sats were in the 70's on room air. EMS arrived, found sats in 80's. Placed Pt on 2 lpm NC. EKG shows controlled rate A-fib.      Triage Assessment (Adult)       Row Name 03/24/25 0814          Triage Assessment    Airway WDL WDL        Respiratory WDL    Respiratory WDL rhythm/pattern;expansion/retractions     Rhythm/Pattern, Respiratory shortness of breath;tachypneic        Skin Circulation/Temperature WDL    Skin Circulation/Temperature WDL WDL        Cardiac WDL    Cardiac WDL WDL        Peripheral/Neurovascular WDL    Peripheral Neurovascular WDL WDL        Cognitive/Neuro/Behavioral WDL    Cognitive/Neuro/Behavioral WDL WDL

## 2025-03-24 NOTE — ED PROVIDER NOTES
EMERGENCY DEPARTMENT ENCOUNTER      NAME: Maranda Downing  AGE: 89 year old female  YOB: 1935  MRN: [id number]  EVALUATION DATE & TIME: 3/24/2025  8:42 AM    PCP: Farzana Sparrow    ED PROVIDER: Robb Peng MD      Chief Complaint   Patient presents with    Shortness of Breath         FINAL IMPRESSION:  1. Acute respiratory failure with hypoxia (H)    2. Congestive heart failure, unspecified HF chronicity, unspecified heart failure type (H)          ED COURSE & MEDICAL DECISION MAKING:    Pertinent Labs & Imaging studies reviewed. (See chart for details)  89 year old female presents to the Emergency Department for evaluation of hypoxemia and confusion        ED Course as of 03/24/25 1445   Mon Mar 24, 2025   0850 I called [facility] and was transferred to nurse who did not answer the phone   0851 Pt is DNR/DNI   1003 Here with confusion thought to be due to UTI.  Started Macrobid has not taken it yet.  Culture still pending per family.   1003 Elevated D-dimer, has contrast allergy reportedly with urticaria but unclear.  Will obtain chest x-ray VQ scan.  Elevated troponin.  A-fib RVR likely demand related.  Repeat troponin pending   1003 N-Terminal Pro BNP Inpatient(!): 2,555  Likely CHF.  Chest x-ray ordered   1003 Influenza A: Negative   1003 Influenza B: Negative   1003 Resp Syncytial Virus: Negative   1003 SARS CoV2 PCR: Negative   1004 Lasix 60 mg ordered   1018 I spoke with the nurse at her care facility, 76-80 percent oxygen saturday.    1019 UA culture pending   1019 No known falls   1159 Rechecked and updated patient.     Chest x-ray shows worsening fibrosis.  Elevated proBNP.  He is on Lasix.  Given 60 of IV Lasix for decompensated CHF in setting of A-fib new onset    Give ceftriaxone and azithromycin cover for pneumonia as well as possible UTI    VQ scan ordered to evaluate for PE.  Discussed with the resident team who agreed to admit patient.    Medical Decision  Making  Obtained supplemental history:Supplemental history obtained?: EMS and Daughter  Reviewed external records: External records reviewed?: Documented in Chart  Care impacted by chronic illness: Chronic Respiratory Failure with Hypoxia, Bronchiectasis with Acute Lower Respiratory Infection, CAD, Hypertension, Type 2 Diabetes, Hypercholesterolemia, Stage 3 CKD  Did you consider but not order tests?: Work up considered but not performed and documented in chart, if applicable  Did you interpret images independently?: Independent interpretation of ECG and images noted in documentation, when applicable.  Consultation discussion with other provider:Did you involve another provider (consultant, , pharmacy, etc.)?: I discussed the care with another health care provider, see documentation for details.  Admit.    MIPS (CTPE, Dental pain, Driver, Sinusitis, Asthma/COPD, Head Trauma): Not Applicable    SEPSIS: None              At the conclusion of the encounter I discussed the results of all of the tests and the disposition. The questions were answered. The patient or family acknowledged understanding and was agreeable with the care plan.         MEDICATIONS GIVEN IN THE EMERGENCY:  Medications   busPIRone (BUSPAR) tablet 10 mg (has no administration in time range)   Vitamin D3 (CHOLECALCIFEROL) tablet 25 mcg (has no administration in time range)   fexofenadine (ALLEGRA) tablet 180 mg (has no administration in time range)   furosemide (LASIX) tablet 40 mg (has no administration in time range)   gabapentin (NEURONTIN) capsule 100 mg (has no administration in time range)   loperamide (IMODIUM) capsule 2 mg (has no administration in time range)   mirtazapine (REMERON) tablet TABS 7.5 mg (has no administration in time range)   polyethylene glycol-propylene glycol (SYSTANE ULTRA) ophthalmic solution 1 drop (has no administration in time range)   potassium chloride ER (MICRO-K) CR capsule 10 mEq (has no administration in time  range)   sertraline (ZOLOFT) tablet 150 mg (has no administration in time range)   lidocaine 1 % 0.1-1 mL (has no administration in time range)   lidocaine (LMX4) cream (has no administration in time range)   sodium chloride (PF) 0.9% PF flush 3 mL (has no administration in time range)   sodium chloride (PF) 0.9% PF flush 3 mL (has no administration in time range)   senna-docusate (SENOKOT-S/PERICOLACE) 8.6-50 MG per tablet 1 tablet (has no administration in time range)     Or   senna-docusate (SENOKOT-S/PERICOLACE) 8.6-50 MG per tablet 2 tablet (has no administration in time range)   calcium carbonate (TUMS) chewable tablet 1,000 mg (has no administration in time range)   enoxaparin ANTICOAGULANT (LOVENOX) injection 40 mg (has no administration in time range)   acetaminophen (TYLENOL) tablet 650 mg (has no administration in time range)     Or   acetaminophen (TYLENOL) Suppository 650 mg (has no administration in time range)   melatonin tablet 1 mg (has no administration in time range)   ondansetron (ZOFRAN ODT) ODT tab 4 mg (has no administration in time range)     Or   ondansetron (ZOFRAN) injection 4 mg (has no administration in time range)   prochlorperazine (COMPAZINE) injection 5 mg (has no administration in time range)     Or   prochlorperazine (COMPAZINE) tablet 5 mg (has no administration in time range)   cefTRIAXone (ROCEPHIN) 1 g vial to attach to  mL bag for ADULTS or NS 50 mL bag for PEDS (has no administration in time range)   furosemide (LASIX) injection 60 mg (60 mg Intravenous $Given 3/24/25 1104)   cefTRIAXone (ROCEPHIN) 1 g vial to attach to  mL bag for ADULTS or NS 50 mL bag for PEDS (0 g Intravenous Stopped 3/24/25 1329)   azithromycin (ZITHROMAX) 500 mg in sodium chloride 0.9 % 250 mL intermittent infusion (500 mg Intravenous $New Bag 3/24/25 1335)   technetium pentetate Tc99m (DTPA) inhaled radioisotope 50-70 millicurie (61.4 millicuries Inhalation $Given 3/24/25 1125)   technetium  pertechnetate with albumin (Tc99m MAA) radioisotope injection 3-9 millicurie (8.3 millicuries Intravenous $Given 3/24/25 8146)       NEW PRESCRIPTIONS STARTED AT TODAY'S ER VISIT  New Prescriptions    No medications on file          =================================================================    TRIAGE ASSESSMENT:  Pt coming from assisted living. At 0300 Pt awoke with SOB. Staff noted Pt had increased and irregular heart rate, and Pt has no Hx of A-fib. Staff also noted Pt's O2 sats were in the 70's on room air. EMS arrived, found sats in 80's. Placed Pt on 2 lpm NC. EKG shows controlled rate A-fib.      Triage Assessment (Adult)       Row Name 03/24/25 0849          Triage Assessment    Airway WDL WDL        Respiratory WDL    Respiratory WDL rhythm/pattern;expansion/retractions     Rhythm/Pattern, Respiratory shortness of breath;tachypneic        Skin Circulation/Temperature WDL    Skin Circulation/Temperature WDL WDL        Cardiac WDL    Cardiac WDL WDL        Peripheral/Neurovascular WDL    Peripheral Neurovascular WDL WDL        Cognitive/Neuro/Behavioral WDL    Cognitive/Neuro/Behavioral WDL WDL                          HPI    Patient information was obtained from: Patient, Daughter, and EMS    Use of : N/A        Maranda Downing is a 89 year old female with a pertinent history of chronic respiratory failure with hypoxia, bronchiectasis with acute lower respiratory infection, CAD, hypertension, type 2 diabetes, hypercholesterolemia, stage 3 CKD, and acute DVT who presents to this ED via EMS for evaluation of shortness of breath.     Per the EMS personnel, the patient is coming from assisted living, [facility] [place]. At 0300, the patient awoke with shortness of breath. Staff noted she had increased and irregular heart rate, and has no history of A-fib. Staff also noted patient's O2 sats were in the 70's on room air. Upon EMS arrival, her sats were in the 80's. EMS placed the patient  on 2 lpm NC. EKG shows controlled rate A-fib. Prior to this, the patient is diagnosed with a UTI recently and was not improving with the prescribed antibiotics. Her antibiotics was changed yesterday, but the patient has yet to take it.     Per the patient, the patient had worsening shortness of breath all night and 5 episodes of confusion due to her antibiotics for a UTI. She reports multiple episodes of shortness of breath in the past, but never to this degree. More so, she has recurrent UTIs and has shown confusion symptoms before. Currently in the emergency department, the patient reports feeling shaky, which is a chronic condition that has made it difficult for her to ambulate, but denies chills or chest pain. Denies a history of atrial fibrillation and is not on blood thinners.    Pet the patient's daughter, the patient as been exhibiting chills and increased confusion since her UTI diagnosis. The patient has chronic bladder infections and began to exhibit UTI symptoms on 3/19. She was prescribed Cipro preemptively before the lab results came in on Friday to confirm the UTI. However, the patient is allergic to Cipro and the antibiotics were changed. The patient's symptoms were not improved so the antibiotics were changed again yesterday, and the patient has yet to take them. The daughter is unsure if the medication changes are the cause of the patient's shortness of breath. She notes that 1 month ago the patient had her Ativan prescription changed to a tricyclic antidepressant, and they noticed increased shortness of breath. Of note, the patient also has history of blood clots, last one was 3 years ago, and was prescribed blood blood thinners, but has not been on them since.         REVIEW OF SYSTEMS   Review of Systems   Constitutional:  Positive for chills.   Respiratory:  Positive for shortness of breath.    Genitourinary:  Positive for dysuria.   Psychiatric/Behavioral:  Positive for confusion.         PAST  "MEDICAL HISTORY:  No past medical history on file.    PAST SURGICAL HISTORY:  No past surgical history on file.        CURRENT MEDICATIONS:    acetaminophen (TYLENOL) 325 MG tablet  alendronate (FOSAMAX) 70 MG tablet  busPIRone (BUSPAR) 10 MG tablet  cholecalciferol (VITAMIN D3) 25 mcg (1000 units) capsule  fexofenadine (ALLEGRA) 180 MG tablet  furosemide (LASIX) 20 MG tablet  gabapentin (NEURONTIN) 100 MG capsule  ketoconazole (NIZORAL) 2 % external cream  loperamide (IMODIUM) 2 MG capsule  mirtazapine (REMERON) 7.5 MG tablet  nystatin (MYCOSTATIN) 630148 UNIT/GM external powder  polyethylene glycol-propylene glycol (SYSTANE ULTRA) 0.4-0.3 % SOLN ophthalmic solution  potassium chloride ER (MICRO-K) 10 MEQ CR capsule  sertraline (ZOLOFT) 100 MG tablet  zinc oxide (DESITIN) 20 % external ointment  fluticasone (FLONASE) 50 MCG/ACT nasal spray        ALLERGIES:  Allergies   Allergen Reactions    Codeine Nausea    Hydrocodone      Other reaction(s): GI Upset  nausea    Levofloxacin      Other reaction(s): Intolerance-Can't Take  Made leg pain worse.    Lisinopril Cough    Morphine Nausea and Vomiting     Other reaction(s): Nausea/Vomiting    Sulfa Antibiotics Nausea    Penicillins Rash       FAMILY HISTORY:  No family history on file.    SOCIAL HISTORY:   Social History     Socioeconomic History    Marital status:    Tobacco Use    Smoking status: Unknown    Smokeless tobacco: Never       VITALS:  BP (!) 143/71   Pulse 100   Temp 99.4  F (37.4  C) (Oral)   Resp 23   Ht 1.575 m (5' 2\")   Wt 64.9 kg (143 lb)   SpO2 95%   BMI 26.16 kg/m      PHYSICAL EXAM      Vitals: BP (!) 143/71   Pulse 100   Temp 99.4  F (37.4  C) (Oral)   Resp 23   Ht 1.575 m (5' 2\")   Wt 64.9 kg (143 lb)   SpO2 95%   BMI 26.16 kg/m    General: Appears in no acute distress, awake, alert, interactive.  Eyes: Conjunctivae non-injected. Sclera anicteric.  HENT: Atraumatic.  Neck: Supple.  Respiratory/Chest: Crackles to left lung base " on 2 L O2.  Cardiovascular: Irregular rate and rhythm.   Abdomen: non distended  Musculoskeletal: Normal extremities. No erythema. 2+ radial pulses, No pitting edema  Skin: Normal color. No rash or diaphoresis.  Neurologic: Face symmetric, moves all extremities spontaneously. Speech clear. Speaks in full sentences  Psychiatric: Oriented to person, place, and time. Affect appropriate.    LAB:  All pertinent labs reviewed and interpreted.  Results for orders placed or performed during the hospital encounter of 03/24/25   Chest XR,  PA & LAT    Impression    IMPRESSION: Both lateral views taken in a chair and degraded by motion.    Notable increase in the interstitial opacities bilaterally, left greater than right with some sparing of the right lower lobe. Calcified granuloma in the left upper lobe again noted.    Patient clearly has underlying interstitial lung disease and whether this has simply progressed over the last 4 years ago or if there is a superimposed acute process is unclear.     Shaggy heart border. Heart is of normal size No effusions. Descending thoracic aorta is heavily calcified.    Chronic compression fracture at T10 with vertebral plana again noted and progression of the compression fracture at T12 since 2021 CT with at least 50% loss of vertebral body height.    CT chest can evaluate lungs further as needed.   NM Lung Scan Ventilation and Perfusion    Impression    IMPRESSION:    No evidence of pulmonary embolism.   Head CT w/o contrast    Impression    IMPRESSION:  1.  No CT evidence for acute intracranial process.  2.  Brain atrophy and presumed chronic microvascular ischemic changes as above.   Comprehensive metabolic panel   Result Value Ref Range    Sodium 142 135 - 145 mmol/L    Potassium 4.0 3.4 - 5.3 mmol/L    Carbon Dioxide (CO2) 28 22 - 29 mmol/L    Anion Gap 10 7 - 15 mmol/L    Urea Nitrogen 16.7 8.0 - 23.0 mg/dL    Creatinine 0.94 0.51 - 0.95 mg/dL    GFR Estimate 57 (L) >60  mL/min/1.73m2    Calcium 9.0 8.8 - 10.4 mg/dL    Chloride 104 98 - 107 mmol/L    Glucose 115 (H) 70 - 99 mg/dL    Alkaline Phosphatase 108 40 - 150 U/L    AST 26 0 - 45 U/L    ALT 7 0 - 50 U/L    Protein Total 7.4 6.4 - 8.3 g/dL    Albumin 3.9 3.5 - 5.2 g/dL    Bilirubin Total 0.8 <=1.2 mg/dL   D dimer quantitative   Result Value Ref Range    D-Dimer Quantitative 1.01 (H) 0.00 - 0.50 ug/mL FEU   Nt probnp inpatient   Result Value Ref Range    N terminal Pro BNP Inpatient 2,555 (H) 0 - 1,800 pg/mL   Result Value Ref Range    Troponin T, High Sensitivity 32 (H) <=14 ng/L   Influenza A/B, RSV and SARS-CoV2 PCR (COVID-19) Nose    Specimen: Nose; Swab   Result Value Ref Range    Influenza A PCR Negative Negative    Influenza B PCR Negative Negative    RSV PCR Negative Negative    SARS CoV2 PCR Negative Negative   UA with Microscopic reflex to Culture    Specimen: Urine, Clean Catch   Result Value Ref Range    Color Urine Light Yellow Colorless, Straw, Light Yellow, Yellow    Appearance Urine Clear Clear    Glucose Urine Negative Negative mg/dL    Bilirubin Urine Negative Negative    Ketones Urine Negative Negative mg/dL    Specific Gravity Urine 1.015 1.001 - 1.030    Blood Urine 0.06 mg/dL (A) Negative    pH Urine 5.5 5.0 - 7.0    Protein Albumin Urine 30 (A) Negative mg/dL    Urobilinogen Urine Normal Normal mg/dL    Nitrite Urine Positive (A) Negative    Leukocyte Esterase Urine 250 [place]/uL (A) Negative    Bacteria Urine Many (A) None Seen /HPF    RBC Urine 1 <=2 /HPF    WBC Urine 13 (H) <=5 /HPF    Squamous Epithelials Urine <1 <=1 /HPF   CBC with platelets and differential   Result Value Ref Range    WBC Count 7.4 4.0 - 11.0 10e3/uL    RBC Count 3.95 3.80 - 5.20 10e6/uL    Hemoglobin 12.2 11.7 - 15.7 g/dL    Hematocrit 37.5 35.0 - 47.0 %    MCV 95 78 - 100 fL    MCH 30.9 26.5 - 33.0 pg    MCHC 32.5 31.5 - 36.5 g/dL    RDW 12.7 10.0 - 15.0 %    Platelet Count 271 150 - 450 10e3/uL    % Neutrophils 85 %    %  Lymphocytes 8 %    % Monocytes 4 %    % Eosinophils 1 %    % Basophils 0 %    % Immature Granulocytes 1 %    NRBCs per 100 WBC 0 <1 /100    Absolute Neutrophils 6.3 1.6 - 8.3 10e3/uL    Absolute Lymphocytes 0.6 (L) 0.8 - 5.3 10e3/uL    Absolute Monocytes 0.3 0.0 - 1.3 10e3/uL    Absolute Eosinophils 0.1 0.0 - 0.7 10e3/uL    Absolute Basophils 0.0 0.0 - 0.2 10e3/uL    Absolute Immature Granulocytes 0.1 <=0.4 10e3/uL    Absolute NRBCs 0.0 10e3/uL   Extra Red Top Tube   Result Value Ref Range    Hold Specimen JIC    Result Value Ref Range    Troponin T, High Sensitivity 33 (H) <=14 ng/L   Result Value Ref Range    Magnesium 1.7 1.7 - 2.3 mg/dL   Result Value Ref Range    Procalcitonin 0.15 <0.50 ng/mL       RADIOLOGY:  Reviewed all pertinent imaging. Please see official radiology report.  NM Lung Scan Ventilation and Perfusion   Final Result   IMPRESSION:      No evidence of pulmonary embolism.      Chest XR,  PA & LAT   Final Result   IMPRESSION: Both lateral views taken in a chair and degraded by motion.      Notable increase in the interstitial opacities bilaterally, left greater than right with some sparing of the right lower lobe. Calcified granuloma in the left upper lobe again noted.      Patient clearly has underlying interstitial lung disease and whether this has simply progressed over the last 4 years ago or if there is a superimposed acute process is unclear.       Shaggy heart border. Heart is of normal size No effusions. Descending thoracic aorta is heavily calcified.      Chronic compression fracture at T10 with vertebral plana again noted and progression of the compression fracture at T12 since 2021 CT with at least 50% loss of vertebral body height.      CT chest can evaluate lungs further as needed.      Head CT w/o contrast   Final Result   IMPRESSION:   1.  No CT evidence for acute intracranial process.   2.  Brain atrophy and presumed chronic microvascular ischemic changes as above.          EKG:     Performed at: 0855, 24-Mar-25    Impression: Atrial fibrillation, Low voltage QRS, Right bundle branch block    Rate: 98 BPM  Rhythm: atrial fibrillation   Axis: *, 17, -35  VT Interval: * ms  QRS Interval: 114 ms  QTc Interval: 388/495 ms  ST Changes: Nonspecific ST changes  Comparison: No previous ECGs available     I have independently reviewed and interpreted the EKG(s) documented above.      I, Rojelio Kayley, am serving as a scribe to document services personally performed by Robb Peng MD based on my observation and the provider's statements to me. I, Robb Peng MD, attest that Rojelio Zaldivar is acting in a scribe capacity, has observed my performance of the services and has documented them in accordance with my direction.    Robb Peng MD  [facility] EMERGENCY DEPARTMENT  [address] [place] [id number]  [phone]      Robb Peng MD  03/24/25 0446       Robb Peng MD  03/24/25 0257       Robb Peng MD  03/24/25 0482

## 2025-03-24 NOTE — ED NOTES
"[facility] ED Handoff Report    ED Chief Complaint: Increased SOB, Ongoing UTI.     ED Diagnosis:  (J96.01) Acute respiratory failure with hypoxia (H)  Comment: Increased SOB, 80% on RA on arrival. Mid 90's on 2 lpm NC.   Plan: Wean O2, treat CHF.     (I50.9) Congestive heart failure, unspecified HF chronicity, unspecified heart failure type (H)  Comment: Elevated BMP, edema in BLE.   Plan: Diuretics.       PMH:  No past medical history on file.     Code Status:  No Order     Falls Risk: Yes Band: Applied    Current Living Situation/Residence: lives in an assisted living facility     Elimination Status: Continent: wears briefs     Activity Level: Wheel chair.     Patients Preferred Language:  English     Needed: No    Vital Signs:  BP (!) 143/71   Pulse 100   Temp 99.4  F (37.4  C) (Oral)   Resp 23   Ht 1.575 m (5' 2\")   Wt 64.9 kg (143 lb)   SpO2 95%   BMI 26.16 kg/m       Cardiac Rhythm: A-fib, rte 60- 110.     Pain Score: 4/10, pain when voiding.     Is the Patient Confused:  Yes.Also [place].     Last Food or Drink: 03/24/25 at 1400    Focused Assessment:  Pt alert, very [place]. Oriented to person, place and time but asks repetitive questions.Pt c/o increased SOB and swelling in legs. Pt states she has had a UTI for a week but has had to have meds switched due to allergy.     Tests Performed: Done: Labs and Imaging    Treatments Provided:  IV, ABX.    Family Dynamics/Concerns: No    Family Updated On Visitor Policy: Yes    Plan of Care Communicated to Family: Yes    Who Was Updated about Plan of Care: Pt and daughter.    Belongings Checklist Done and Signed by Patient: Yes    Medications sent with patient: None.     Covid: symptomatic, negative    RN: Bill Henning RN 3/24/2025 1:56 PM       "

## 2025-03-24 NOTE — H&P
[facility]    History and Physical - Hospitalist Service       Date of Admission:  3/24/2025    Assessment & Plan      Maranda Downing is a 89 year old female admitted on 3/24/2025. She has a history of recurrent UTI, HFpEF, RBBB, CAD, DM2, Fatty Liver, HTN, HLD, IBS, GERD, Thyroid Nodule who presented with shortness of breath, hypoxia, and dysuria and is admitted for sepsis 2/2 UTI.     Sepsis  E. Coli UTI  Acute Hypoxic Respiratory Failure, improving  Worsening confusion and shortness of breath for 4 days PTA. [facility] 70s-[facility]s [facility]. Tachycardic on admission and tachypnea, satting 90s on 2LNC meeting SIRS criteria. Reviewed recent urine culture 3/20/25 pan susceptible E. Coli (intermediate zosyn), suspect vitals 2/2 urosepsis. History of recurrent UTIs. Symptoms of trouble urinating at home with exquisite dysuria for 4 days PTA. No leukocytosis, afebrile which is reassuring. Some chills at home. BMP unremarkable, procal WNL. Covid/flu/rsv negative. CXR with significant interstitial opacities B/L, unclear if underlying pre-existing ILD vs acute changes. Stable calcified granuloma LIBBY. D-dimer elevated 1.01, reported contrast allergy, VQ scan with no PE. Given dose of azithromycin as well in ED, though on further review do not suspect acute changes on her CXR making PNA less likely.  - Oxygen as needed, wean as able  - Antibiotics   - Ceftriaxone (3/24 - current)   - Discontinue azithromycin (3/24 x 1 dose)  - PT/OT/SW  - If improving and O2 requirement resolved, possibly discharge 3/25 on oral antibiotics    HFpEF  Paroxysmal Atrial Fibrillation  Elevated troponin  Afib noted on chart review, previously rate controlled though not currently on medications. Was in afib RVR on admission per signout, rate controlled on initial evaluation with HR ~100 and irregular. Not on anticoagulation with history of recurrent bleeding. Troponin 32 -> 33 on admission, likely demand  "with tachycardia. Does have crackles on lung exam, related to likely lung disease. Echocardiogram 2021 with LVEF 55-60%. Otherwise does not appear significantly hypervolemic on exam.   - s/p 60 mg IV lasix in ED   - Will hold on additional extra diuresis pending treatment of UTI above  - PTA lasix 40 mg PO daily  - Consider rate control for sustained afib HR > 110  - PTA Kcl capsules 10 mEq BID  - Consider updating echo pending clinical course    Chronic T10, T12 Fractures  Noted on CXR, known to patient, stable. Mildly tender on exam.   - Continue to monitor    T2DM  Not on PTA meds, multiple A1cs in last year under 6, most recent 9 months ago 5.5. Glucose on admission 115.   - Continue to monitor    Chronic conditions:  Depression/Anxiety - PTA sertraline, buspar, mirtazapine  Allergies - PTA allegra PRN   Chronic diarrhea - PTA PRN loperamide  HTN, HLD, GERD, CAD - not on PTA meds                  Diet: Combination Diet Low Saturated Fat Na <2400mg Diet, No Caffeine Diet    DVT Prophylaxis: Enoxaparin (Lovenox) SQ  Drvier Catheter: Not present  Fluids: PO  Lines: None     Cardiac Monitoring: None  Code Status: No CPR- Do NOT Intubate    Clinically Significant Risk Factors Present on Admission                   # Hypertension: Noted on problem list           # Overweight: Estimated body mass index is 26.16 kg/m  as calculated from the following:    Height as of this encounter: 1.575 m (5' 2\").    Weight as of this encounter: 64.9 kg (143 lb).       # Financial/Environmental Concerns: none         Disposition Plan      Expected Discharge Date: 03/26/2025      Destination: assisted living          The patient's care was discussed with the Attending Physician, Dr. Rebolledo .      Donald Calderon MD  Hospitalist Service  [facility]  Securely message with [employer] (more info)  Text page via [facility] Paging/Directory   ______________________________________________________________________    Chief " Complaint   Confusion, shortness of breath    History is obtained from the patient and patient's daughter     History of Present Illness   Maranda Downing is a 89 year old female admitted on 3/24/2025. She has a history of recurrent UTI, aAF, diastolic HF, RBBB, CAD, DM2, Fatty Liver, HTN, HLD, IBS, GERD, Thyroid Nodule who presented with confusion, shortness or breath, hypoxia    4 days PTA patient started noticing worsening shortness of breath accompanied by burning pain with urination, came in today because of acutely worsening shortness of breath with hypoxia. Has also had chills the last couple nights but no fevers. No other sick symptoms/cough. Does have chronic diarrhea but no changes recently, no blood in urine. No vomiting. Patient's daughter concerned for increasing confusion as well.    Primary provider obtained UA that reflexed to culture and has been growing >100K E.coli 3/20, family reports that cipro was prescribed over the weekend, but could not take because of allergy. Macrobid was prescribed and started x1 dose last night.     No known outbreaks at [facility], patient's [facility]. She is wheelchair bound at home because of weakness in the legs. Assistance with meds, dressing, meals, and toileting.     In the ED, she was started on 2L NC, given CTX and azithromycin for possible PNA, lasix 60 mg IV for possible HF exacerbation. Dimer elevated, could not get CT PE because of history of contrast allergy, so VQ obtained which showed no PE.       Past Medical History    No past medical history on file.    Past Surgical History   No past surgical history on file.    Prior to Admission Medications   Prior to Admission Medications   Prescriptions Last Dose Informant Patient Reported? Taking?   acetaminophen (TYLENOL) 325 MG tablet 3/23/2025 Evening  Yes Yes   Sig: Take 325 mg by mouth every 4 hours as needed for mild pain   alendronate (FOSAMAX) 70 MG tablet 3/22/2025  Yes Yes   Sig: Take 70 mg by mouth  every 7 days.   busPIRone (BUSPAR) 10 MG tablet 3/23/2025 Evening  Yes Yes   Sig: Take 10 mg by mouth 2 times daily.   cholecalciferol (VITAMIN D3) 25 mcg (1000 units) capsule 3/23/2025  Yes Yes   Sig: Take 1 capsule by mouth daily.   fexofenadine (ALLEGRA) 180 MG tablet Past Week  Yes Yes   Sig: Take 180 mg by mouth daily as needed for allergies.   fluticasone (FLONASE) 50 MCG/ACT nasal spray   Yes No   Sig: Spray 2 sprays into both nostrils daily as needed for allergies.   furosemide (LASIX) 20 MG tablet 3/23/2025 Morning  Yes Yes   Sig: Take 40 mg by mouth daily.   gabapentin (NEURONTIN) 100 MG capsule 3/23/2025 Evening  Yes Yes   Sig: Take 100 mg by mouth 2 times daily.   ketoconazole (NIZORAL) 2 % external cream Past Month  Yes Yes   Sig: Apply topically daily as needed for itching.   loperamide (IMODIUM) 2 MG capsule 3/23/2025 Morning  Yes Yes   Sig: Take 2 mg by mouth 3 times daily as needed for diarrhea   mirtazapine (REMERON) 7.5 MG tablet 3/23/2025 Bedtime  Yes Yes   Sig: Take 7.5 mg by mouth at bedtime.   nystatin (MYCOSTATIN) 208997 UNIT/GM external powder Past Month  Yes Yes   Sig: Apply topically 2 times daily as needed for dry skin.   polyethylene glycol-propylene glycol (SYSTANE ULTRA) 0.4-0.3 % SOLN ophthalmic solution 3/23/2025 Evening  Yes Yes   Sig: Place 1 drop into both eyes 2 times daily as needed for dry eyes.   potassium chloride ER (MICRO-K) 10 MEQ CR capsule 3/23/2025 Evening  Yes Yes   Sig: Take 10 mEq by mouth 2 times daily.   sertraline (ZOLOFT) 100 MG tablet 3/23/2025 Morning  Yes Yes   Sig: Take 150 mg by mouth daily.   zinc oxide (DESITIN) 20 % external ointment Past Month  Yes Yes   Sig: Apply topically as needed for dry skin or irritation.      Facility-Administered Medications: None      ROS negative except as noted in HPI above       Physical Exam   Vital Signs: Temp: 99.4  F (37.4  C) Temp src: Oral BP: (!) 143/71 Pulse: 100   Resp: 23 SpO2: 95 % O2 Device: Nasal cannula Oxygen  Delivery: 2 LPM  Weight: 143 lbs 0 oz    Constitutional: awake, alert, cooperative, lying in bed in no apparent distress  Eyes: Pupils equal, round and reactive to light, extra ocular muscles intact, sclera clear, conjunctiva normal  ENT: normocephalic, without obvious abnormality, atraumatic  Respiratory: Fine crackles in B/L mid and lower lung fields. Fair air movement throughout. No increased work of breathing, no wheezing  Cardiovascular: Tachycardic, irregularly irregular. 2/6 systolic murmur loudest RUSB/LUSB. Normal S1 and S2, no S3 or S4  GI: Mild suprapubic tenderness. No rebound or guarding. Normal bowel sounds, soft, non-distended, no masses palpated, no hepatosplenomegaly  Genitounirinary: external catheter in place, draining dark urine  Skin: varicose veins present in BLE with scattered ecchymoses. No other rashes or lesions visualized on exposed skin.  Musculoskeletal: There is no redness, warmth, or swelling of the joints. Moves all four extremities spontaneously.  Neurologic: Awake, alert, oriented to name, place and time.  Cranial nerves II-XII are grossly intact.  Neuropsychiatric: General: normal, calm, and normal eye contact  Level of consciousness: alert / normal  Affect: normal and pleasant    Medical Decision Making       Please see A&P for additional details of medical decision making.      Data     I have personally reviewed the following data over the past 24 hrs:    7.4  \   12.2   / 271     142 104 16.7 /  115 (H)   4.0 28 0.94 \     ALT: 7 AST: 26 AP: 108 TBILI: 0.8   ALB: 3.9 TOT PROTEIN: 7.4 LIPASE: N/A     Trop: 33 (H) BNP: 2,555 (H)     Procal: 0.15 CRP: N/A Lactic Acid: N/A       INR:  N/A PTT:  N/A   D-dimer:  1.01 (H) Fibrinogen:  N/A       Imaging results reviewed over the past 24 hrs:   Recent Results (from the past 24 hours)   Head CT w/o contrast    Narrative    EXAM: CT HEAD W/O CONTRAST  LOCATION: [facility]  DATE: 3/24/2025    INDICATION:  Confusion  COMPARISON: 7/31/2024.  TECHNIQUE: Routine CT Head without IV contrast. Multiplanar reformats. Dose reduction techniques were used.    FINDINGS:  INTRACRANIAL CONTENTS: No intracranial hemorrhage, extraaxial collection, or mass effect.  No CT evidence of acute infarct. Moderate presumed chronic small vessel ischemic changes. Moderate generalized volume loss. No hydrocephalus.     VISUALIZED ORBITS/SINUSES/MASTOIDS: No intraorbital abnormality. No paranasal sinus mucosal disease. No middle ear or mastoid effusion.    BONES/SOFT TISSUES: No acute abnormality.      Impression    IMPRESSION:  1.  No CT evidence for acute intracranial process.  2.  Brain atrophy and presumed chronic microvascular ischemic changes as above.   Chest XR,  PA & LAT    Narrative    EXAM: XR CHEST 2 VIEWS  LOCATION: [facility]  DATE: 3/24/2025    INDICATION: sob, hypoxic  COMPARISON: 12/25/2021, CT thoracic spine 11/13/2021, CT AP 4/20/2021      Impression    IMPRESSION: Both lateral views taken in a chair and degraded by motion.    Notable increase in the interstitial opacities bilaterally, left greater than right with some sparing of the right lower lobe. Calcified granuloma in the left upper lobe again noted.    Patient clearly has underlying interstitial lung disease and whether this has simply progressed over the last 4 years ago or if there is a superimposed acute process is unclear.     Shaggy heart border. Heart is of normal size No effusions. Descending thoracic aorta is heavily calcified.    Chronic compression fracture at T10 with vertebral plana again noted and progression of the compression fracture at T12 since 2021 CT with at least 50% loss of vertebral body height.    CT chest can evaluate lungs further as needed.   NM Lung Scan Ventilation and Perfusion    Narrative    EXAM: NM LUNG SCAN VENTILATION AND PERFUSION  LOCATION: [facility]  DATE: 3/24/2025    INDICATION:  Hypoxic, short of breath, elevated D dimer, contrast allergy  COMPARISON: Chest radiograph the 3/24/2025.  TECHNIQUE: 61.4 mCi Tc-99m DTPA inhaled. 8.3 mCi Tc-99m MAA, IV. Standard planar imaging during perfusion and ventilation portions of exam.    FINDINGS:     Heterogeneous pulmonary ventilation, which may be due to underlying interstitial lung disease or inhomogeneous airflow. Normal homogeneous pulmonary perfusion. No mismatched segmental perfusion defect.      Impression    IMPRESSION:    No evidence of pulmonary embolism.

## 2025-03-24 NOTE — PLAN OF CARE
Goal Outcome Evaluation:      Plan of Care Reviewed With: patient, child          Outcome Evaluation: Plan return to assisted living facility when medically ready for discharge.

## 2025-03-25 ENCOUNTER — APPOINTMENT (OUTPATIENT)
Dept: OCCUPATIONAL THERAPY | Facility: HOSPITAL | Age: OVER 89
End: 2025-03-25
Attending: FAMILY MEDICINE
Payer: MEDICARE

## 2025-03-25 ENCOUNTER — APPOINTMENT (OUTPATIENT)
Dept: PHYSICAL THERAPY | Facility: HOSPITAL | Age: OVER 89
End: 2025-03-25
Attending: FAMILY MEDICINE
Payer: MEDICARE

## 2025-03-25 LAB
ANION GAP SERPL CALCULATED.3IONS-SCNC: 10 MMOL/L (ref 7–15)
BACTERIA UR CULT: ABNORMAL
BUN SERPL-MCNC: 18.6 MG/DL (ref 8–23)
CALCIUM SERPL-MCNC: 8.4 MG/DL (ref 8.8–10.4)
CHLORIDE SERPL-SCNC: 104 MMOL/L (ref 98–107)
CREAT SERPL-MCNC: 0.98 MG/DL (ref 0.51–0.95)
EGFRCR SERPLBLD CKD-EPI 2021: 55 ML/MIN/1.73M2
ERYTHROCYTE [DISTWIDTH] IN BLOOD BY AUTOMATED COUNT: 12.5 % (ref 10–15)
GLUCOSE BLDC GLUCOMTR-MCNC: 126 MG/DL (ref 70–99)
GLUCOSE SERPL-MCNC: 100 MG/DL (ref 70–99)
HCO3 SERPL-SCNC: 29 MMOL/L (ref 22–29)
HCT VFR BLD AUTO: 33.9 % (ref 35–47)
HGB BLD-MCNC: 10.8 G/DL (ref 11.7–15.7)
MAGNESIUM SERPL-MCNC: 1.7 MG/DL (ref 1.7–2.3)
MCH RBC QN AUTO: 30.3 PG (ref 26.5–33)
MCHC RBC AUTO-ENTMCNC: 31.9 G/DL (ref 31.5–36.5)
MCV RBC AUTO: 95 FL (ref 78–100)
PLATELET # BLD AUTO: 262 10E3/UL (ref 150–450)
POTASSIUM SERPL-SCNC: 3.8 MMOL/L (ref 3.4–5.3)
RBC # BLD AUTO: 3.56 10E6/UL (ref 3.8–5.2)
SODIUM SERPL-SCNC: 143 MMOL/L (ref 135–145)
WBC # BLD AUTO: 7.5 10E3/UL (ref 4–11)

## 2025-03-25 PROCEDURE — 83735 ASSAY OF MAGNESIUM: CPT | Performed by: FAMILY MEDICINE

## 2025-03-25 PROCEDURE — 97535 SELF CARE MNGMENT TRAINING: CPT | Mod: GO

## 2025-03-25 PROCEDURE — 99232 SBSQ HOSP IP/OBS MODERATE 35: CPT | Mod: GC

## 2025-03-25 PROCEDURE — 120N000001 HC R&B MED SURG/OB

## 2025-03-25 PROCEDURE — 80048 BASIC METABOLIC PNL TOTAL CA: CPT

## 2025-03-25 PROCEDURE — 36415 COLL VENOUS BLD VENIPUNCTURE: CPT

## 2025-03-25 PROCEDURE — 250N000011 HC RX IP 250 OP 636: Performed by: FAMILY MEDICINE

## 2025-03-25 PROCEDURE — 97162 PT EVAL MOD COMPLEX 30 MIN: CPT | Mod: GP

## 2025-03-25 PROCEDURE — 250N000013 HC RX MED GY IP 250 OP 250 PS 637: Performed by: FAMILY MEDICINE

## 2025-03-25 PROCEDURE — 97166 OT EVAL MOD COMPLEX 45 MIN: CPT | Mod: GO

## 2025-03-25 PROCEDURE — 250N000011 HC RX IP 250 OP 636

## 2025-03-25 PROCEDURE — 250N000013 HC RX MED GY IP 250 OP 250 PS 637

## 2025-03-25 PROCEDURE — 85018 HEMOGLOBIN: CPT

## 2025-03-25 PROCEDURE — 97530 THERAPEUTIC ACTIVITIES: CPT | Mod: GP

## 2025-03-25 RX ORDER — POTASSIUM CHLORIDE 7.45 MG/ML
10 INJECTION INTRAVENOUS
Status: COMPLETED | OUTPATIENT
Start: 2025-03-25 | End: 2025-03-25

## 2025-03-25 RX ORDER — MAGNESIUM SULFATE HEPTAHYDRATE 40 MG/ML
2 INJECTION, SOLUTION INTRAVENOUS ONCE
Status: COMPLETED | OUTPATIENT
Start: 2025-03-25 | End: 2025-03-25

## 2025-03-25 RX ADMIN — BUSPIRONE HYDROCHLORIDE 10 MG: 10 TABLET ORAL at 10:19

## 2025-03-25 RX ADMIN — GABAPENTIN 100 MG: 100 CAPSULE ORAL at 10:17

## 2025-03-25 RX ADMIN — ACETAMINOPHEN 650 MG: 325 TABLET ORAL at 16:30

## 2025-03-25 RX ADMIN — MAGNESIUM SULFATE HEPTAHYDRATE 2 G: 40 INJECTION, SOLUTION INTRAVENOUS at 09:12

## 2025-03-25 RX ADMIN — CEFTRIAXONE SODIUM 1 G: 1 INJECTION, POWDER, FOR SOLUTION INTRAMUSCULAR; INTRAVENOUS at 10:23

## 2025-03-25 RX ADMIN — ENOXAPARIN SODIUM 40 MG: 40 INJECTION SUBCUTANEOUS at 21:08

## 2025-03-25 RX ADMIN — MIRTAZAPINE 7.5 MG: 7.5 TABLET, FILM COATED ORAL at 21:08

## 2025-03-25 RX ADMIN — POTASSIUM CHLORIDE 10 MEQ: 7.46 INJECTION, SOLUTION INTRAVENOUS at 09:09

## 2025-03-25 RX ADMIN — Medication 25 MCG: at 10:18

## 2025-03-25 RX ADMIN — FUROSEMIDE 40 MG: 20 TABLET ORAL at 10:18

## 2025-03-25 RX ADMIN — POTASSIUM CHLORIDE 10 MEQ: 750 TABLET, EXTENDED RELEASE ORAL at 21:08

## 2025-03-25 RX ADMIN — POTASSIUM CHLORIDE 10 MEQ: 7.46 INJECTION, SOLUTION INTRAVENOUS at 10:31

## 2025-03-25 RX ADMIN — GABAPENTIN 100 MG: 100 CAPSULE ORAL at 21:08

## 2025-03-25 RX ADMIN — SERTRALINE HYDROCHLORIDE 150 MG: 100 TABLET ORAL at 10:18

## 2025-03-25 RX ADMIN — BUSPIRONE HYDROCHLORIDE 10 MG: 10 TABLET ORAL at 21:08

## 2025-03-25 ASSESSMENT — ACTIVITIES OF DAILY LIVING (ADL)
ADLS_ACUITY_SCORE: 58
ADLS_ACUITY_SCORE: 50
ADLS_ACUITY_SCORE: 50
ADLS_ACUITY_SCORE: 66
ADLS_ACUITY_SCORE: 62
ADLS_ACUITY_SCORE: 66
ADLS_ACUITY_SCORE: 58
ADLS_ACUITY_SCORE: 58
ADLS_ACUITY_SCORE: 50
ADLS_ACUITY_SCORE: 58
ADLS_ACUITY_SCORE: 66
ADLS_ACUITY_SCORE: 50
ADLS_ACUITY_SCORE: 66
ADLS_ACUITY_SCORE: 58
ADLS_ACUITY_SCORE: 58
ADLS_ACUITY_SCORE: 50
ADLS_ACUITY_SCORE: 66
ADLS_ACUITY_SCORE: 50
ADLS_ACUITY_SCORE: 50
ADLS_ACUITY_SCORE: 62
ADLS_ACUITY_SCORE: 66
ADLS_ACUITY_SCORE: 50
ADLS_ACUITY_SCORE: 66

## 2025-03-25 NOTE — CONSULTS
Care Management Follow Up    Length of Stay (days): 1    Expected Discharge Date: 03/27/2025    Anticipated Discharge Plan:  Return to [facility] vs. Transitional Care    Transportation: Anticipate Family/friend    PT Recommendations:    OT Recommendations:  Transitional Care Facility     Barriers to Discharge: medical stability, placement    Prior Living Situation: apartment, assisted living with alone    Discussed  Partnership in Safe Discharge Planning  document with patient/family: No     Handoff Completed: No, handoff not indicated or clinically appropriate    Patient/Spokesperson Updated: Yes. Who? Patient and daughter Carmella    Additional Information:  REKHA met and spoke with Pt and her son about therapy recommendations for TCU placement at discharge. Pt's son requested for CM to contact Pt's daughter Carmella to discuss discharge planning. REKHA left a TCU list in Pt's room for family to review if interested in TCU placement at discharge.    REKHA called Carmella and discuss recommendations for TCU. Carmella reported that she thinks TCU would be beneficial for Pt to get stronger but she is concerned Pt will be resistant to the recommendations. Carmella asked for CM to send a referral to [facility]. Carmella plans to visit Pt this afternoon to speak with Pt further about TCU at discharge. Referral sent. REKHA called and updated [facility] JAROD Pratt regarding recommendations for discharge.     2:58 PM  REKHA met and spoke with Pt and Carmella in Pt's room regarding discharge planning. Pt reported that she does not want to discharge to TCU and would like to return to her [facility] at discharge. REKHA discussed the recommendations and safety with Pt living alone. REKHA noted that CM would reach out to her [facility] to discuss what the facility would like Pt to have for support before they can accept her back.    REKHA called and left a voicemail with the [facility] [place] RN line requesting a call back.     Contacts:  Carmella, daughter:  [phone]  [facility] ([facility])  Phone: [phone]  [facility] Case Manager - [facility]: [phone]  [facility]: [phone]    Next Steps: CM to follow up with Pt's [facility] to discuss Pt returning to the facility at discharge.       MAYKEL Beal

## 2025-03-25 NOTE — PROGRESS NOTES
03/25/25 0905   Appointment Info   Signing Clinician's Name / Credentials (OT) Radha Wolfe OT   Living Environment   People in Home alone   Current Living Arrangements assisted living   Home Accessibility wheelchair accessible   Transportation Anticipated family or friend will provide   Living Environment Comments pt was able to complete all ADLs/transfers independently to a w/c used for mobility   Self-Care   Usual Activity Tolerance good   Current Activity Tolerance fair   Equipment Currently Used at Home wheelchair, manual;grab bar, toilet;grab bar, tub/shower   Fall history within last six months no   Instrumental Activities of Daily Living (IADL)   Previous Responsibilities   (family/staff does all IADLs)   General Information   Onset of Illness/Injury or Date of Surgery 03/24/25   Referring Physician Dr Rebolledo   Patient/Family Therapy Goal Statement (OT) go to TCU   Additional Occupational Profile Info/Pertinent History of Current Problem Maranda Downing is a 89 year old female admitted on 3/24/2025. She has a history of recurrent UTI, HFpEF, RBBB, CAD, DM2, Fatty Liver, HTN, HLD, IBS, GERD, Thyroid Nodule who presented with shortness of breath, hypoxia, and dysuria and is admitted for sepsis 2/2 UTI.   Existing Precautions/Restrictions other (see comments)  (wound bottom)   Left Upper Extremity (Weight-bearing Status) full weight-bearing (FWB)   Right Upper Extremity (Weight-bearing Status) full weight-bearing (FWB)   Left Lower Extremity (Weight-bearing Status) full weight-bearing (FWB)   Right Lower Extremity (Weight-bearing Status) full weight-bearing (FWB)   Cognitive Status Examination   Orientation Status person   Follows Commands follows multi-step commands;50-74% accuracy   Cognitive Status Comments [place]   Visual Perception   Visual Impairment/Limitations corrective lenses full-time   Sensory   Sensory Quick Adds sensation intact   Pain Assessment   Patient Currently in Pain Yes, see Vital  Sign flowsheet  (BLE's)   Posture   Posture forward head position   Range of Motion Comprehensive   General Range of Motion no range of motion deficits identified   Strength Comprehensive (MMT)   General Manual Muscle Testing (MMT) Assessment no strength deficits identified   Muscle Tone Assessment   Muscle Tone Quick Adds No deficits were identified   Coordination   Upper Extremity Coordination No deficits were identified   Bed Mobility   Bed Mobility supine-sit;sit-supine   Supine-Sit [place] (Bed Mobility) maximum assist (25% patient effort)   Sit-Supine [place] (Bed Mobility) moderate assist (50% patient effort)   Transfers   Transfers sit-stand transfer   Sit-Stand Transfer   Sit-Stand [place] (Transfers) maximum assist (25% patient effort);2 person assist   Sit/Stand Transfer Comments knees want to buckle when standing   Balance   Balance Assessment standing dynamic balance   Standing Balance: Dynamic moderate assist;2-person assist   Activities of Daily Living   BADL Assessment/Intervention lower body dressing   Lower Body Dressing Assessment/Training   [place] Level (Lower Body Dressing) maximum assist (25% patient effort)   Clinical Impression   Criteria for Skilled Therapeutic Interventions Met (OT) Yes, treatment indicated   OT Diagnosis acute cystitis   Influenced by the following impairments fatigue, decreased ADLs/balance/cognition   OT Problem List-Impairments impacting ADL activity tolerance impaired;balance;cognition   Assessment of Occupational Performance 3-5 Performance Deficits   Identified Performance Deficits fatigue, decreased ADLs/balance/cognition   Planned Therapy Interventions (OT) ADL retraining   Clinical Decision Making Complexity (OT) detailed assessment/moderate complexity   Risk & Benefits of therapy have been explained evaluation/treatment results reviewed;care plan/treatment goals reviewed;risks/benefits reviewed;participants voiced agreement with care plan    OT Total Evaluation Time   OT Eval, Moderate Complexity Minutes (47334) 10   OT Goals   Therapy Frequency (OT) 5 times/week   OT Predicted Duration/Target Date for Goal Attainment 03/31/25   OT Goals Hygiene/Grooming;Lower Body Dressing;Toilet Transfer/Toileting;Cognition   OT: Hygiene/Grooming modified independent   OT: Lower Body Dressing Modified independent   OT: Toilet Transfer/Toileting Supervision/stand-by assist   OT: Cognitive Patient/caregiver will verbalize understanding of cognitive assessment results/recommendations as needed for safe discharge planning   Self-Care/Home Management   Self-Care/Home Mgmt/ADL, Compensatory, Meal Prep Minutes (05531) 10   Symptoms Noted During/After Treatment (Meal Preparation/Planning Training) fatigue   Treatment Detail/Skilled Intervention Wound on buttock makes sitting uncomfortable, STS Mod A of 2 knees buckle after standing 5 seconds, G/H Min A to complete all tasks, LB dress dependent due to decreased LE ROM/standing balance-[facility] has a pocket talker. On 3 L of O2 SATs 93   OT Discharge Planning   OT Plan STS AX2 knees buckle, move towards a transfer-is w/c bound, G/H, LB dress, SLUMs   OT Discharge Recommendation (DC Rec) Transitional Care Facility   OT Rationale for DC Rec pt needs a TCU to increased ADLs/mobility to PLOF for she lives alone in senior living apt   OT Brief overview of current status STS Mod of 2, ADLs dependent

## 2025-03-25 NOTE — PLAN OF CARE
Problem: Adult Inpatient Plan of Care  Goal: Optimal Comfort and Wellbeing  Outcome: Progressing     Problem: UTI (Urinary Tract Infection)  Goal: Improved Infection Symptoms  Outcome: Progressing   Goal Outcome Evaluation:    Pt restful, comfortable over noc. Slept well. Disoriented to place/time/situation. Redirectable and cooperative. Low urine output over noc, bladder scan shows 627ml. Pt denies urge to void. Straight cath performed after discussion with pt daughter Carmella, 750ml urine output. Pt tolerated well. Falls precautions in place.

## 2025-03-25 NOTE — PROGRESS NOTES
"PT Evaluation   03/25/25 8892   Appointment Info   Signing Clinician's Name / Credentials (PT) Shira Whyte PT, DPT   Rehab Comments (PT) Pt's daughter & son present throughout session, supportive, also assist with providing subjective information.   Living Environment   People in Home alone   Current Living Arrangements assisted living   Home Accessibility wheelchair accessible   Transportation Anticipated family or friend will provide   Self-Care   Usual Activity Tolerance good   Current Activity Tolerance moderate   Equipment Currently Used at Home wheelchair, manual;grab bar, toilet;grab bar, tub/shower  (has walker but does not use anymore)   Fall history within last six months no   Activity/Exercise/Self-Care Comment IND with transfers to/from manual wc. Receives assistance with dressing, meals, meds, housekeeping. Also has safety check-ins from staff every few hours. Per daughter and patient, patient is able to receive Ax1 from her senior living for transfers. has walker but has been unable to amb for a long time now d/t unsteadiness in standing.   General Information   Onset of Illness/Injury or Date of Surgery 03/24/25   Referring Physician Donald Calderon MD   Patient/Family Therapy Goals Statement (PT) To go home.   Pertinent History of Current Problem (include personal factors and/or comorbidities that impact the POC) Per chart: \"89 year old female admitted on 3/24/2025. She has a history of recurrent UTI, HFpEF, RBBB, CAD, DM2, Fatty Liver, HTN, HLD, IBS, GERD, Thyroid Nodule who presented with shortness of breath, hypoxia, and dysuria and is admitted for sepsis 2/2 UTI. Remains admitted because of ongoing hypoxia.\"   Existing Precautions/Restrictions fall;oxygen therapy device and L/min  (does not use supplemental O2 at home.)   Cognition   Affect/Mental Status (Cognition) WNL   Orientation Status (Cognition) oriented to;person;place;situation  (did not formally assess)   Follows Commands (Cognition) WNL "   Pain Assessment   Patient Currently in Pain No   Posture    Posture Forward head position   Range of Motion (ROM)   Range of Motion ROM deficits secondary to weakness   ROM Comment unable to reach full hip/trunk ext & knee ext in standing d/t weakness.   Strength (Manual Muscle Testing)   Strength (Manual Muscle Testing) Deficits observed during functional mobility   Bed Mobility   Bed Mobility supine-sit   Supine-Sit [place] (Bed Mobility) minimum assist (75% patient effort);1 person assist   Bed Mobility Limitations impaired ability to control trunk for mobility;decreased ability to use arms for pushing/pulling   Impairments Contributing to Impaired Bed Mobility decreased strength   Assistive Device (Bed Mobility) bed rails   Comment, (Bed Mobility) able to scoot forward ind to eob   Transfers   Transfers sit-stand transfer;bed-chair transfer   Impairments Contributing to Impaired Transfers impaired balance;decreased strength   Bed-Chair Transfer   Bed-Chair [place] (Transfers) minimum assist (75% patient effort);1 person assist   Comment, (Bed-Chair Transfer) squat pivot transfer bed<>chair with Jesus prog to CGA for stability with transfer in session.   Sit-Stand Transfer   Sit-Stand [place] (Transfers) minimum assist (75% patient effort);2 person assist   Comment, (Sit-Stand Transfer) minAx2 arm in arm. Pt able to complete further stands in session minAx1 with Jesus for balance in static standing, pt unsteady/shaky on feet.   Gait/Stairs (Locomotion)   [place] Level (Gait) not tested   Comment, (Gait/Stairs) Patient has been non-ambulatory for a while per family.   Balance   Balance other (describe)   Balance Comments Jesus for balance in static standing; cga-Jesus for squat pivot transfers   Sensory Examination   Sensory Perception patient reports no sensory changes   Clinical Impression   Criteria for Skilled Therapeutic Intervention Yes, treatment indicated   PT Diagnosis (PT) Impaired  functional mobility   Influenced by the following impairments Impaired strength, balance, activity tolerance: supplemental O2 needs   Functional limitations due to impairments Bed mobility, transfers, gait, endurance   Clinical Presentation (PT Evaluation Complexity) stable   Clinical Presentation Rationale Clinical judgment   Clinical Decision Making (Complexity) moderate complexity   Planned Therapy Interventions (PT) balance training;bed mobility training;gait training;home exercise program;neuromuscular re-education;patient/family education;stair training;strengthening;stretching;transfer training;progressive activity/exercise;home program guidelines   Risk & Benefits of therapy have been explained evaluation/treatment results reviewed;participants included;patient;participants voiced agreement with care plan   PT Total Evaluation Time   PT Eval, Moderate Complexity Minutes (42150) 10   Physical Therapy Goals   PT Frequency Daily   PT Predicted Duration/Target Date for Goal Attainment 04/01/25   PT Goals Bed Mobility;Transfers;Wheelchair Mobility   PT: Bed Mobility Supine to/from sit;Supervision/stand-by assist   PT: Transfers Bed to/from chair;Supervision/stand-by assist   PT: Wheelchair Mobility 50 feet;Caregiver SBA;manual wheelchair   Interventions   Interventions Quick Adds Therapeutic Activity   Therapeutic Activity   Therapeutic Activities: dynamic activities to improve functional performance Minutes (30515) 24   Symptoms Noted During/After Treatment Fatigue;Significant change in vital signs   Treatment Detail/Skilled Intervention RN approved increase in supplemental O2 for activity to 2LPM NC (patient mid 80s SpO2 upon sitting up & with activity). Pt not audibly SOB but continues to be at mid80s SpO2 with transfer practice. Patient benefits from verbal & visual cues frequently throughout session for PLB technique d/t tendency to mouth-breathe, pt able to reach 90% SpO2 after few minutes of PLB on 2LPM NC  during extended seated rest breaks. facilitated practice of x2 further sit/stands with Jesus for balance in static standing to improve standing tolerance. facilitated practice of x8 squat pivot transfers recliner<>EOB with use of bedrail for UE support cga-Jesus - pt demos good technique of reaching toward railing/chair for safe transfer. Education provided to pt & family on having Ax1 for transfers for safety until pt's mobility/strength improves, also recommended home PT to help pt return to PLOF. Pt's family confirms that pt can receive Ax1 from senior care for transfers for safety. Pt & family agreeable to home PT. Pt left sitting up in recliner, call light & all other needs in reach, chair alarm engaged. Message left for RN regarding pt still on 2LPM NC d/t desating.   PT Discharge Planning   PT Plan Bring manual wc to practice pivot transfers & wc mobility; stands for standing tolerance, BLE strengthening   PT Discharge Recommendation (DC Rec) home with assist;home with home care physical therapy   PT Rationale for DC Rec Patient currently requiring Ax1 for bed mobility and safety with transfers. Pt & family today report [facility] can provide this level of assistance. As long as [facility] can provide Ax1 with all transfers, anticipate patient would be safe to go home in addition to having home PT to help her return to full PLOF.   PT Brief overview of current status eJsus sup>sit, cga-Jesus squat pivot transfers, Jesus static standing balance   PT Total Distance Amb During Session (feet) 0   PT Equipment Needed at Discharge   (has manual wc at home)   Physical Therapy Time and Intention   Timed Code Treatment Minutes 24   Total Session Time (sum of timed and untimed services) 34

## 2025-03-25 NOTE — PLAN OF CARE
Goal Outcome Evaluation:      Plan of Care Reviewed With: patient, family    Overall Patient Progress: improvingOverall Patient Progress: improving           Problem: Adult Inpatient Plan of Care  Goal: Optimal Comfort and Wellbeing  Outcome: Progressing     Problem: Dysrhythmia  Goal: Normalized Cardiac Rhythm  Outcome: Progressing       Pt is A&Ox3/4, SR w/ PAC's, on 2L NC from 3L with a baseline of RA, and assistx2 with cares. Daughter at bedside. K and Mg protocol, recheck in the AM.  Pt passed bedside swallow, diet ordered. Mepilex in place on coccyx. Prima fit in place.  Bladder scanned at 1200 for 149 ml.     Tele discontinued this shift, removed.     Ana Lerma RN

## 2025-03-25 NOTE — PROVIDER NOTIFICATION
"RN called to the room, pt reported having an emesis. Family at bedside assist pt. Emesis caught in plate. Clear with undigested food. Pt denies nausea. VSS. Pt had few bites of ice cream before sudden need to vomit. Family state pt did not say anything before hand, just started dry heaving, then vomited when able to get plate underneath. Pt stated at home this only happens sometimes \"in the morning with that one med\" but unable to specify which. Stated sometimes meds get stuck. Notified Dr. Calderon, no longer on duty, passed information on to house, Dr. Robertson. Pt stable and able to swallow small sips of water without issue. Pt declined PRN.    Update @ 2030: Pt called RN d/t 2nd emesis. Smaller emesis. Pt stated nausea with this occurrence. PRN IV zofran given. Dr. Robertson updated.   - NPO until AM team can assess  - dysphagia screening order  - IV Protonix  - hold ar PO meds    "

## 2025-03-25 NOTE — PROGRESS NOTES
[facility]    Progress Note - Hospitalist Service       Date of Admission:  3/24/2025    Assessment & Plan   Maranda Downing is a 89 year old female admitted on 3/24/2025. She has a history of recurrent UTI, HFpEF, RBBB, CAD, DM2, Fatty Liver, HTN, HLD, IBS, GERD, Thyroid Nodule who presented with shortness of breath, hypoxia, and dysuria and is admitted for sepsis 2/2 UTI. Remains admitted because of ongoing hypoxia.      Sepsis  E. Coli UTI  Worsening confusion and shortness of breath for 4 days PTA. O2 sats in 70s-80s at care home per chart review. Tachycardic on admission and tachypnea, satting 90s on 2LNC meeting SIRS criteria. Reviewed recent urine culture 3/20/25 pan susceptible E. Coli (intermediate zosyn), suspect vitals 2/2 urosepsis. History of recurrent UTIs. Symptoms of trouble urinating at home with exquisite dysuria for 4 days PTA. No leukocytosis, afebrile which is reassuring. Some chills at home. BMP unremarkable, procal WNL. Covid/flu/rsv negative. Pulm findings as below.  - Antibiotics              - Ceftriaxone (3/24 - current)   - Will consider cephalosporin at discharge              - s/p azithromycin (3/24 x 1 dose)  - PT/OT/SW   - OT recommending TCU, PT to assess 3/25 afternoon  - If improving and O2 requirement resolved, possibly discharge 3/26 on oral antibiotics  - Delirium precautions      Acute Hypoxic Respiratory Failure, improving  Pulmonary Opacities  CXR with significant interstitial opacities B/L, unclear if underlying pre-existing ILD vs acute changes as below. Stable calcified granuloma LIBBY.Family with CXR from 3/20/25 that showed no significant changes from prior though images not available.  D-dimer elevated 1.01, reported contrast allergy, VQ scan with no PE. Given dose of azithromycin as well in ED, though on further review do not suspect acute changes on her CXR making PNA less likely. Suspect from available data and rales on exam this is likely  chronic process.   - Oxygen as needed, wean as able    HFpEF  Paroxysmal Atrial Fibrillation  Elevated troponin  Afib noted on chart review, previously rate controlled though not currently on medications. Was in afib RVR on admission per signout, rate controlled on initial evaluation with HR ~100 and irregular. Not on anticoagulation with history of recurrent bleeding. Troponin 32 -> 33 on admission, likely demand with tachycardia. Does have crackles on lung exam, related to likely lung disease. Echocardiogram 2021 with LVEF 55-60%. Otherwise does not appear significantly hypervolemic on exam.   - s/p 60 mg IV lasix in ED              - Will hold on additional extra diuresis pending treatment of UTI above  - PTA lasix 40 mg PO daily  - Consider rate control for sustained afib HR > 110  - PTA Kcl capsules 10 mEq BID  - Consider updating echo pending clinical course    Vomiting  Emesis x2 3/24. Patient notes she does sometimes vomit in AM. Family notes she was vomiting last time she had sepsis. Not feeling nauseous now, passed bedside swallow.   - Continue diet, monitor closely    Chronic T10, T12 Fractures  Noted on CXR, known to patient, stable. Mildly tender on exam.   - Continue to monitor     T2DM  Not on PTA meds, multiple A1cs in last year under 6, most recent 9 months ago 5.5. Glucose on admission 115.   - Continue to monitor     Chronic conditions:  Depression/Anxiety - PTA sertraline, buspar, mirtazapine  Allergies - PTA allegra PRN   Chronic diarrhea - PTA PRN loperamide  HTN, HLD, GERD, CAD - not on PTA meds         Diet: Low Saturated Fat Na <2400 mg    DVT Prophylaxis: Enoxaparin (Lovenox) SQ  Driver Catheter: Not present  Fluids: PO  Lines: None     Cardiac Monitoring: None  Code Status: No CPR- Do NOT Intubate      Clinically Significant Risk Factors Present on Admission           # Hypocalcemia: Lowest Ca = 8.4 mg/dL in last 2 days, will monitor and replace as appropriate         # Hypertension: Noted  on problem list        # Anemia: based on hgb <11           # Financial/Environmental Concerns: none         Social Drivers of Health    Received from [facility]    Social Connections         Disposition Plan     Medically Ready for Discharge: Anticipated Tomorrow         The patient's care was discussed with the Attending Physician, Dr. Rebolledo .    Donald Calderon MD  Hospitalist Service  [facility]  Securely message with [employer] (more info)  Text page via [employer] Paging/Directory   ______________________________________________________________________    Interval History   Had a couple small emesis events, was made NPO. Passed bedside swallow.  Patient states that sometimes she does have vomiting like this in the mornings, rarely at night.  She was not feeling nauseous at the time and is not nauseous now.    Otherwise feels like her breathing is a little better today, has not been urinating much with n.p.o. overnight unclear if still pain with urination.  No fevers or chills.        Physical Exam   Vital Signs: Temp: 98  F (36.7  C) Temp src: Oral BP: 102/58 Pulse: 90   Resp: 18 SpO2: 95 % O2 Device: Nasal cannula Oxygen Delivery: 2 LPM  Weight: 130 lbs 11.72 oz    Constitutional: awake, alert, cooperative, lying in bed in no apparent distress  Eyes: Extra ocular muscles intact, sclera clear, conjunctiva normal  Respiratory: Fine crackles in B/L mid and lower lung fields, unchanged. Fair air movement throughout. No increased work of breathing, no wheezing  Cardiovascular: Irregularly irregular. 2/6 systolic murmur loudest RUSB/LUSB. Normal S1 and S2, no S3 or S4  GI: Non-tender. No rebound or guarding. Normal bowel sounds, soft, non-distended, no masses palpated, no hepatosplenomegaly  Skin: varicose veins present in BLE with scattered ecchymoses. No other rashes or lesions visualized on exposed skin.  Musculoskeletal: There is no redness, warmth, or  swelling of the joints. Moves all four extremities spontaneously.  Neurologic: Cranial nerves II-XII are grossly intact.  Neuropsychiatric: General: normal, calm, and normal eye contact  Level of consciousness: alert / normal  Affect: normal and pleasant    Medical Decision Making       Please see A&P for additional details of medical decision making.      Data     I have personally reviewed the following data over the past 24 hrs:    7.5  \   10.8 (L)   / 262     143 104 18.6 /  100 (H)   3.8 29 0.98 (H) \     Trop: 33 (H) BNP: N/A     Procal: N/A CRP: N/A Lactic Acid: N/A         Imaging results reviewed over the past 24 hrs:   Recent Results (from the past 24 hours)   NM Lung Scan Ventilation and Perfusion    Narrative    EXAM: NM LUNG SCAN VENTILATION AND PERFUSION  LOCATION: [facility]  DATE: 3/24/2025    INDICATION: Hypoxic, short of breath, elevated D dimer, contrast allergy  COMPARISON: Chest radiograph the 3/24/2025.  TECHNIQUE: 61.4 mCi Tc-99m DTPA inhaled. 8.3 mCi Tc-99m MAA, IV. Standard planar imaging during perfusion and ventilation portions of exam.    FINDINGS:     Heterogeneous pulmonary ventilation, which may be due to underlying interstitial lung disease or inhomogeneous airflow. Normal homogeneous pulmonary perfusion. No mismatched segmental perfusion defect.      Impression    IMPRESSION:    No evidence of pulmonary embolism.

## 2025-03-25 NOTE — PLAN OF CARE
Problem: Delirium  Goal: Improved Attention and Thought Clarity  Outcome: Not Progressing     Problem: Dysrhythmia  Goal: Normalized Cardiac Rhythm  Outcome: Progressing     Goal Outcome Evaluation:      Plan of Care Reviewed With: patient, family    Overall Patient Progress: improvingOverall Patient Progress: improving    Outcome Evaluation: Pt arrived from ED this afternoon. Intermittently disorientated to time and situation. Reoritentating pt frequently. Denies chest pain or SOB. 3LPM NC. Tele SR/ST with freuqent PAC. Rare bursts of afib RVR noted on monitor.     2x occurance emesis this evening. (See provider notification note) Pt NPO for AM team assessment.    Pocket talker used as hearing aids left at home. Primafit in place.    K and Mg protocol.

## 2025-03-26 ENCOUNTER — LAB REQUISITION (OUTPATIENT)
Dept: LAB | Facility: CLINIC | Age: OVER 89
End: 2025-03-26
Payer: MEDICARE

## 2025-03-26 ENCOUNTER — APPOINTMENT (OUTPATIENT)
Dept: RADIOLOGY | Facility: HOSPITAL | Age: OVER 89
DRG: 871 | End: 2025-03-26
Payer: MEDICARE

## 2025-03-26 VITALS
RESPIRATION RATE: 20 BRPM | HEART RATE: 91 BPM | HEIGHT: 62 IN | TEMPERATURE: 97.6 F | OXYGEN SATURATION: 93 % | BODY MASS INDEX: 22.07 KG/M2 | WEIGHT: 119.93 LBS | DIASTOLIC BLOOD PRESSURE: 56 MMHG | SYSTOLIC BLOOD PRESSURE: 112 MMHG

## 2025-03-26 DIAGNOSIS — I50.33 ACUTE ON CHRONIC DIASTOLIC (CONGESTIVE) HEART FAILURE (H): ICD-10-CM

## 2025-03-26 DIAGNOSIS — D64.9 ANEMIA, UNSPECIFIED: ICD-10-CM

## 2025-03-26 LAB
ANION GAP SERPL CALCULATED.3IONS-SCNC: 10 MMOL/L (ref 7–15)
BUN SERPL-MCNC: 22.3 MG/DL (ref 8–23)
CALCIUM SERPL-MCNC: 8.5 MG/DL (ref 8.8–10.4)
CHLORIDE SERPL-SCNC: 101 MMOL/L (ref 98–107)
CREAT SERPL-MCNC: 0.92 MG/DL (ref 0.51–0.95)
EGFRCR SERPLBLD CKD-EPI 2021: 59 ML/MIN/1.73M2
ERYTHROCYTE [DISTWIDTH] IN BLOOD BY AUTOMATED COUNT: 12.6 % (ref 10–15)
GLUCOSE SERPL-MCNC: 114 MG/DL (ref 70–99)
HCO3 SERPL-SCNC: 26 MMOL/L (ref 22–29)
HCT VFR BLD AUTO: 30.5 % (ref 35–47)
HGB BLD-MCNC: 9.9 G/DL (ref 11.7–15.7)
MAGNESIUM SERPL-MCNC: 2 MG/DL (ref 1.7–2.3)
MCH RBC QN AUTO: 30.8 PG (ref 26.5–33)
MCHC RBC AUTO-ENTMCNC: 32.5 G/DL (ref 31.5–36.5)
MCV RBC AUTO: 95 FL (ref 78–100)
PLATELET # BLD AUTO: 269 10E3/UL (ref 150–450)
POTASSIUM SERPL-SCNC: 3.8 MMOL/L (ref 3.4–5.3)
RBC # BLD AUTO: 3.21 10E6/UL (ref 3.8–5.2)
SODIUM SERPL-SCNC: 137 MMOL/L (ref 135–145)
WBC # BLD AUTO: 7.9 10E3/UL (ref 4–11)

## 2025-03-26 PROCEDURE — 85018 HEMOGLOBIN: CPT

## 2025-03-26 PROCEDURE — 36415 COLL VENOUS BLD VENIPUNCTURE: CPT

## 2025-03-26 PROCEDURE — 83735 ASSAY OF MAGNESIUM: CPT | Performed by: FAMILY MEDICINE

## 2025-03-26 PROCEDURE — 82310 ASSAY OF CALCIUM: CPT

## 2025-03-26 PROCEDURE — 250N000013 HC RX MED GY IP 250 OP 250 PS 637: Performed by: FAMILY MEDICINE

## 2025-03-26 PROCEDURE — 250N000011 HC RX IP 250 OP 636

## 2025-03-26 PROCEDURE — 71045 X-RAY EXAM CHEST 1 VIEW: CPT

## 2025-03-26 PROCEDURE — 99238 HOSP IP/OBS DSCHRG MGMT 30/<: CPT | Mod: GC

## 2025-03-26 PROCEDURE — 250N000013 HC RX MED GY IP 250 OP 250 PS 637

## 2025-03-26 PROCEDURE — 80048 BASIC METABOLIC PNL TOTAL CA: CPT

## 2025-03-26 RX ORDER — FUROSEMIDE 10 MG/ML
40 INJECTION INTRAMUSCULAR; INTRAVENOUS ONCE
Status: COMPLETED | OUTPATIENT
Start: 2025-03-26 | End: 2025-03-26

## 2025-03-26 RX ORDER — CEPHALEXIN 500 MG/1
500 CAPSULE ORAL 2 TIMES DAILY
Status: SHIPPED
Start: 2025-03-27 | End: 2025-03-31

## 2025-03-26 RX ORDER — FUROSEMIDE 20 MG/1
60 TABLET ORAL DAILY
Status: ON HOLD
Start: 2025-03-26

## 2025-03-26 RX ADMIN — Medication 25 MCG: at 08:01

## 2025-03-26 RX ADMIN — BUSPIRONE HYDROCHLORIDE 10 MG: 10 TABLET ORAL at 08:01

## 2025-03-26 RX ADMIN — POTASSIUM CHLORIDE 10 MEQ: 750 TABLET, EXTENDED RELEASE ORAL at 08:01

## 2025-03-26 RX ADMIN — ACETAMINOPHEN 650 MG: 325 TABLET ORAL at 10:22

## 2025-03-26 RX ADMIN — CEFTRIAXONE SODIUM 1 G: 1 INJECTION, POWDER, FOR SOLUTION INTRAMUSCULAR; INTRAVENOUS at 10:25

## 2025-03-26 RX ADMIN — FUROSEMIDE 40 MG: 20 TABLET ORAL at 08:01

## 2025-03-26 RX ADMIN — SERTRALINE HYDROCHLORIDE 150 MG: 100 TABLET ORAL at 08:01

## 2025-03-26 RX ADMIN — FUROSEMIDE 40 MG: 10 INJECTION, SOLUTION INTRAMUSCULAR; INTRAVENOUS at 12:57

## 2025-03-26 RX ADMIN — GABAPENTIN 100 MG: 100 CAPSULE ORAL at 08:01

## 2025-03-26 ASSESSMENT — ACTIVITIES OF DAILY LIVING (ADL)
ADLS_ACUITY_SCORE: 66
ADLS_ACUITY_SCORE: 66
ADLS_ACUITY_SCORE: 74
ADLS_ACUITY_SCORE: 66
ADLS_ACUITY_SCORE: 74
ADLS_ACUITY_SCORE: 66
ADLS_ACUITY_SCORE: 74
ADLS_ACUITY_SCORE: 66
ADLS_ACUITY_SCORE: 76
ADLS_ACUITY_SCORE: 66
ADLS_ACUITY_SCORE: 74
ADLS_ACUITY_SCORE: 66
ADLS_ACUITY_SCORE: 70
ADLS_ACUITY_SCORE: 76
ADLS_ACUITY_SCORE: 66

## 2025-03-26 NOTE — DISCHARGE SUMMARY
Pt discharge to assisted living, with help of daughter transport. home oxygen was sent with pt, sating at 92 %. All belonging was sent with pt, no concern at the time of discharge.

## 2025-03-26 NOTE — DISCHARGE SUMMARY
[facility]  Discharge Summary - Medicine & Pediatrics       Date of Admission:  3/24/2025  Date of Discharge:  3/26/2025  Discharging Provider: Dr. Calderon, Dr. Rebolledo  Discharge Service: Hospitalist Service    Discharge Diagnoses   Sepsis with Associated Organ Dysfunction of Acute respiratory failure & metabolic encephalopathy, improving  E. Coli Cystitis  Acute Hypoxic Respiratory Failure, improving  Pulmonary Opacities  ?ILD  HFpEF  Paroxysmal Atrial Fibrillation  Elevated troponin    Clinically Significant Risk Factors          Follow-ups Needed After Discharge   PCP/[facility] provider follow up within 7 days, consider CBC and BMP at that time     Unresulted Labs Ordered in the Past 30 Days of this Admission       No orders found from 2/22/2025 to 3/25/2025.        These results will be followed up by n/a    Discharge Disposition   Discharged to assisted living  Condition at discharge: Stable    Hospital Course   Maranda Downing is a 89 year old female admitted on 3/24/2025. She has a history of recurrent UTI, HFpEF, RBBB, CAD, DM2, Fatty Liver, HTN, HLD, IBS, GERD, Thyroid Nodule who presented with shortness of breath, hypoxia, and dysuria and is admitted for sepsis 2/2 UTI. 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       Sepsis with Associated Organ Dysfunction of Acute respiratory failure & metabolic encephalopathy, improving  E. Coli Cystitis  Worsening confusion and shortness of breath for 4 days PTA. O2 sats in 70s-80s at [facility] per chart review. Tachycardic on admission and tachypnea, satting 90s on 2LNC meeting SIRS criteria. Recent urine culture 3/20/25 pan susceptible E. Coli (intermediate zosyn), suspect vitals 2/2 urosepsis with dysuria for 4 days PTA. No leukocytosis, afebrile through admission. OT recommended TCU, PT recommending home with home care - patient will return to [facility] to  finish antibiotic course.  - Antibiotics (will give 7 total days)              - Ceftriaxone (3/24 - 3/26)              - Will discharge on keflex 500 mg BID through 3/30              - s/p azithromycin (3/24 x 1 dose)     Acute Hypoxic Respiratory Failure, improving  Pulmonary Opacities  ?ILD  CXR with significant interstitial opacities B/L, unclear if underlying pre-existing ILD vs acute changes as below. Stable calcified granuloma LIBBY. No PE on imaging. Given dose of azithromycin as well in ED, though on further review do not suspect acute changes on her CXR making infectious source less likely. Suspect from available data and rales on exam this is likely chronic process with fibrosis, stable again on CXR 3/26 with some pulmonary vascular congestion. Received spot doses of lasix as below.  - Home care PT/OT/RN ordered   - Low flow home oxygen as needed      Oxygen Documentation  I certify that this patient, Maranda Downing has been under my care (or a nurse practitioner or physican's assistant working with me). This is the face-to-face encounter for oxygen medical necessity.      At the time of this encounter, I have reviewed the qualifying testing and have determined that supplemental oxygen is reasonable and necessary and is expected to improve the patient's condition in a home setting.         Patient has continued oxygen desaturation due to Pulmonary Fibrosis J84.10.    If portability is ordered, is the patient mobile within the home? no    Was this visit performed as a telehealth visit: No    HFpEF  Paroxysmal Atrial Fibrillation  Elevated troponin  Afib noted on chart review, previously rate controlled though not currently on medications. Was in afib RVR on admission, rate controlled not on meds. Not on anticoagulation with history of recurrent bleeding. Troponin 32 -> 33 on admission, likely demand with tachycardia. BNP 2,500 on admission. Crackles on lung exam though related to likely lung disease.  Echocardiogram 2021 with LVEF 55-60%. Otherwise does not appear significantly hypervolemic on exam. She was continued on PTA 40 mg PO lasix and given spot doses of 60 mg IV lasix in ED and 40 mg IV 3/26 prior to discharge.  - PTA Kcl capsules 10 mEq BID  - Increase lasix at discharge from 40 mg PO daily to 60 mg PO daily     Vomiting, improved  Emesis x2 3/24. Patient notes she does sometimes vomit in AM. Family notes she was vomiting last time she had sepsis. Nausea improved, passed bedside swallow.      Chronic T10, T12 Fractures  Noted on CXR, known to patient, stable. Mildly tender on exam.      T2DM  Not on PTA meds, multiple A1cs in last year under 6, most recent 9 months ago 5.5. Glucose on admission 115. Did not require correction insulin.     Chronic conditions:  Depression/Anxiety - PTA sertraline, buspar, mirtazapine  Allergies - PTA allegra PRN   Chronic diarrhea - PTA PRN loperamide  HTN, HLD, GERD, CAD - not on PTA meds     Consultations This Hospital Stay   CARE MANAGEMENT / SOCIAL WORK IP CONSULT  PHYSICAL THERAPY ADULT IP CONSULT  OCCUPATIONAL THERAPY ADULT IP CONSULT  CARE MANAGEMENT / SOCIAL WORK IP CONSULT  PHARMACY IP CONSULT  CARE MANAGEMENT / SOCIAL WORK IP CONSULT  PHYSICAL THERAPY ADULT IP CONSULT  OCCUPATIONAL THERAPY ADULT IP CONSULT    Code Status   No CPR- Do NOT Intubate       The patient was discussed with Dr. Kesha Calderon MD  [address] [address]  Phone: [phone]  Fax: [phone]  ______________________________________________________________________    Physical Exam   Vital Signs: Temp: 99  F (37.2  C) Temp src: Oral BP: 114/54 Pulse: 100   Resp: 20 SpO2: (!) 89 % (RN notified) O2 Device: Nasal cannula Oxygen Delivery: 2 LPM  Weight: 119 lbs 14.88 oz    Constitutional: awake, alert, cooperative, sitting up in bed, well appearing  Eyes: Extra ocular muscles intact, sclera clear, conjunctiva  normal  Respiratory: Fine crackles in B/L mid and lower lung fields, unchanged. Fair air movement throughout. No increased work of breathing, no wheezing  Cardiovascular: Irregularly irregular. 2/6 systolic murmur loudest RUSB/LUSB. Normal S1 and S2, no S3 or S4  GI: Non-tender. No rebound or guarding. Normal bowel sounds, soft, non-distended, no masses palpated, no hepatosplenomegaly  Skin: varicose veins present in BLE with scattered ecchymoses. No other rashes or lesions visualized on exposed skin.  Musculoskeletal: There is no redness, warmth, or swelling of the joints. Moves all four extremities spontaneously. 1+ edema in BLE, unchanged  Neurologic: Alert, oriented to self and time of year, place (hospital though says [place]). Cranial nerves II-XII are grossly intact.  Neuropsychiatric: General: normal, calm, and normal eye contact  Level of consciousness: alert / normal  Affect: normal and pleasant      Primary Care Physician   Joy López    Discharge Orders   No discharge procedures on file.    Significant Results and Procedures   Most Recent 3 CBC's:  Recent Labs   Lab Test 03/26/25  0532 03/25/25  0507 03/24/25  0916   WBC 7.9 7.5 7.4   HGB 9.9* 10.8* 12.2   MCV 95 95 95    262 271     Most Recent 3 BMP's:  Recent Labs   Lab Test 03/26/25  0532 03/25/25  1736 03/25/25  0507 03/24/25  0916     --  143 142   POTASSIUM 3.8  --  3.8 4.0   CHLORIDE 101  --  104 104   CO2 26  --  29 28   BUN 22.3  --  18.6 16.7   CR 0.92  --  0.98* 0.94   ANIONGAP 10  --  10 10   HETAL 8.5*  --  8.4* 9.0   * 126* 100* 115*     Most Recent 3 BNP's:  Recent Labs   Lab Test 03/24/25  0916 08/29/22  1030   NTBNPI 2,555*  --    NTBNP  --  484*       Discharge Medications   Current Discharge Medication List        CONTINUE these medications which have NOT CHANGED    Details   acetaminophen (TYLENOL) 325 MG tablet Take 325 mg by mouth every 4 hours as needed for mild pain      alendronate (FOSAMAX) 70 MG tablet  Take 70 mg by mouth every 7 days.      busPIRone (BUSPAR) 10 MG tablet Take 10 mg by mouth 2 times daily.      cholecalciferol (VITAMIN D3) 25 mcg (1000 units) capsule Take 1 capsule by mouth daily.      fexofenadine (ALLEGRA) 180 MG tablet Take 180 mg by mouth daily as needed for allergies.      furosemide (LASIX) 20 MG tablet Take 40 mg by mouth daily.      gabapentin (NEURONTIN) 100 MG capsule Take 100 mg by mouth 2 times daily.      ketoconazole (NIZORAL) 2 % external cream Apply topically daily as needed for itching.      loperamide (IMODIUM) 2 MG capsule Take 2 mg by mouth 3 times daily as needed for diarrhea      mirtazapine (REMERON) 7.5 MG tablet Take 7.5 mg by mouth at bedtime.      nystatin (MYCOSTATIN) 946889 UNIT/GM external powder Apply topically 2 times daily as needed for dry skin.      polyethylene glycol-propylene glycol (SYSTANE ULTRA) 0.4-0.3 % SOLN ophthalmic solution Place 1 drop into both eyes 2 times daily as needed for dry eyes.      potassium chloride ER (MICRO-K) 10 MEQ CR capsule Take 10 mEq by mouth 2 times daily.      sertraline (ZOLOFT) 100 MG tablet Take 150 mg by mouth daily.      zinc oxide (DESITIN) 20 % external ointment Apply topically as needed for dry skin or irritation.      fluticasone (FLONASE) 50 MCG/ACT nasal spray Spray 2 sprays into both nostrils daily as needed for allergies.           Allergies   Allergies   Allergen Reactions    Codeine Nausea    Hydrocodone      Other reaction(s): GI Upset  nausea    Levofloxacin      Other reaction(s): Intolerance-Can't Take  Made leg pain worse.    Lisinopril Cough    Morphine Nausea and Vomiting     Other reaction(s): Nausea/Vomiting    Sulfa Antibiotics Nausea    Penicillins Rash

## 2025-03-26 NOTE — PLAN OF CARE
Goal Outcome Evaluation:      Plan of Care Reviewed With: patient    Overall Patient Progress: improvingOverall Patient Progress: improving    Outcome Evaluation: VSS. 2LPM NC. Up in chair this afternoon/evening. Primafit in place. Less confusion today vs yesterday. Good appetite. No emesis today.     K and Mg protocol; recheck in AM    Plan: possible PO abx tomorrow; return to [facility] pending (see CM note)

## 2025-03-26 NOTE — PROGRESS NOTES
Patient transferred from P3 arriving on unit around 22:50. Patient alert and oriented to self and place. Patient is forgetful. Vital signs stable . On 2L O2

## 2025-03-26 NOTE — PLAN OF CARE
Goal Outcome Evaluation:         Problem: Adult Inpatient Plan of Care  Goal: Absence of Hospital-Acquired Illness or Injury  Intervention: Prevent Skin Injury  Recent Flowsheet Documentation  Taken 3/26/2025 0800 by Carol Mcgregor RN  Body Position: position changed independently     Problem: Adult Inpatient Plan of Care  Goal: Optimal Comfort and Wellbeing  Intervention: Provide Person-Centered Care  Recent Flowsheet Documentation  Taken 3/26/2025 0800 by Carol Mcgregor RN  Trust Relationship/Rapport:   care explained   choices provided         Up in the chair for meals. Prn tylenol for comfort. She was up to the commode, voided, bladder scan post void less than 300 ml. On 2 L oxygen via NC, oxygen saturation above 92%.   Home oxygen have been called to deliver oxygen for discharge. Awaiting call back.      1545 Provider notified home oxygen needs a face to face note and orders for oxygen.

## 2025-03-26 NOTE — PROGRESS NOTES
Care Management Discharge Note    Discharge Date: 03/26/2025       Discharge Disposition: [facility]- [facility] for RN/PT/OT    Discharge Services: [facility]- [facility] for RN/PT/OT    Discharge DME: Oxygen    Discharge Transportation: family or friend will provide    Private pay costs discussed: Not applicable    Does the patient's insurance plan have a 3 day qualifying hospital stay waiver?  No    PAS Confirmation Code: NA  Patient/family educated on Medicare website which has current facility and service quality ratings: yes    Education Provided on the Discharge Plan: Yes per team  Persons Notified of Discharge Plans: Patient  Patient/Family in Agreement with the Plan: yes    Handoff Referral Completed: No, handoff not indicated or clinically appropriate    Additional Information:  Patient discharge to [facility], [facility]- [facility] for RN/PT/OT. The daughter will transport when the home oxygen is available.     Orders sent to [facility]  Orders sent to Home Care- [facility]     Elisa Ely RN

## 2025-03-26 NOTE — PROGRESS NOTES
[facility]    Progress Note - Hospitalist Service       Date of Admission:  3/24/2025    Assessment & Plan   Maranda Downing is a 89 year old female admitted on 3/24/2025. She has a history of recurrent UTI, HFpEF, RBBB, CAD, DM2, Fatty Liver, HTN, HLD, IBS, GERD, Thyroid Nodule who presented with shortness of breath, hypoxia, and dysuria and is admitted for sepsis 2/2 UTI. 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 Will trial additional spot dose lasix and re-evaluate this afternoon, possibly home today.        Sepsis with Associated Organ Dysfunction of Acute respiratory failure & metabolic encephalopathy, improving  E. Coli Cystitis  Worsening confusion and shortness of breath for 4 days PTA. O2 sats in 70s-80s at custodial per chart review. Tachycardic on admission and tachypnea, satting 90s on 2LNC meeting SIRS criteria. Reviewed recent urine culture 3/20/25 pan susceptible E. Coli (intermediate zosyn), suspect vitals 2/2 urosepsis. History of recurrent UTIs. Symptoms of trouble urinating at home with exquisite dysuria for 4 days PTA. No leukocytosis, afebrile which is reassuring. Some chills at home. BMP unremarkable, procal WNL. Covid/flu/rsv negative. Pulm findings as below.  - Antibiotics              - Ceftriaxone (3/24 - current)   - Will discharge on keflex 500 mg BID through 3/30              - s/p azithromycin (3/24 x 1 dose)  - PT/OT/SW   - OT recommending TCU, PT recommending home with home care  - Delirium precautions      Acute Hypoxic Respiratory Failure, improving  Pulmonary Opacities  CXR with significant interstitial opacities B/L, unclear if underlying pre-existing ILD vs acute changes as below. Stable calcified granuloma LIBBY. Family with CXR from 3/20/25 that showed no significant changes from prior though images not available.  D-dimer elevated 1.01, reported contrast allergy, VQ scan with no PE. Given dose of azithromycin as well in ED,  though on further review do not suspect acute changes on her CXR making infectious source less likely. Suspect from available data and rales on exam this is likely chronic process, stable again on CXR 3/26.   - Oxygen as needed, wean as able  - Will trial additional spot dose 40 mg IV lasix today  - May need low flow home oxygen    HFpEF  Paroxysmal Atrial Fibrillation  Elevated troponin  Afib noted on chart review, previously rate controlled though not currently on medications. Was in afib RVR on admission per signout, rate controlled on initial evaluation with HR ~100 and irregular. Not on anticoagulation with history of recurrent bleeding. Troponin 32 -> 33 on admission, likely demand with tachycardia. Does have crackles on lung exam, related to likely lung disease. Echocardiogram 2021 with LVEF 55-60%. Otherwise does not appear significantly hypervolemic on exam.   - s/p 60 mg IV lasix in ED  - PTA lasix 40 mg PO daily  - Consider rate control for sustained afib HR > 110  - PTA Kcl capsules 10 mEq BID  - Consider updating echo pending clinical course    Vomiting, improved  Emesis x2 3/24. Patient notes she does sometimes vomit in AM. Family notes she was vomiting last time she had sepsis. Not feeling nauseous now, passed bedside swallow.   - Continue diet, monitor closely    Chronic T10, T12 Fractures  Noted on CXR, known to patient, stable. Mildly tender on exam.   - Continue to monitor     T2DM  Not on PTA meds, multiple A1cs in last year under 6, most recent 9 months ago 5.5. Glucose on admission 115.   - Continue to monitor     Chronic conditions:  Depression/Anxiety - PTA sertraline, buspar, mirtazapine  Allergies - PTA allegra PRN   Chronic diarrhea - PTA PRN loperamide  HTN, HLD, GERD, CAD - not on PTA meds         Diet: Low Saturated Fat Na <2400 mg    DVT Prophylaxis: Enoxaparin (Lovenox) SQ  Driver Catheter: Not present  Fluids: PO  Lines: None     Cardiac Monitoring: None  Code Status: No CPR- Do NOT  Intubate      Clinically Significant Risk Factors           # Hypocalcemia: Lowest Ca = 8.4 mg/dL in last 2 days, will monitor and replace as appropriate         # Hypertension: Noted on problem list                  # Financial/Environmental Concerns: none         Social Drivers of Health    Received from [facility]    Social Connections         Disposition Plan     Medically Ready for Discharge: Anticipated Tomorrow         The patient's care was discussed with the Attending Physician, Dr. Rebolledo .    Donald Calderon MD  Hospitalist Service  [facility]  Securely message with iR[employer] (more info)  Text page via [facility] Paging/Directory   ______________________________________________________________________    Interval History   NAEO.  Remains on 2 L nasal cannula.  Desaturated to 86% when O2 turned off during exam.  Overall she is feeling better improved pain with urination.  Denies any shortness of breath today, no fevers or chills    She would like to go back to [facility] with home care, not interested in TCU at this time        Physical Exam   Vital Signs: Temp: 99  F (37.2  C) Temp src: Oral BP: 114/54 Pulse: 100   Resp: 20 SpO2: (!) 89 % O2 Device: Nasal cannula Oxygen Delivery: 2 LPM  Weight: 119 lbs 14.88 oz    Constitutional: awake, alert, cooperative, sitting up in bed, well appearing  Eyes: Extra ocular muscles intact, sclera clear, conjunctiva normal  Respiratory: Fine crackles in B/L mid and lower lung fields, unchanged. Fair air movement throughout. No increased work of breathing, no wheezing  Cardiovascular: Irregularly irregular. 2/6 systolic murmur loudest RUSB/LUSB. Normal S1 and S2, no S3 or S4  GI: Non-tender. No rebound or guarding. Normal bowel sounds, soft, non-distended, no masses palpated, no hepatosplenomegaly  Skin: varicose veins present in BLE with scattered ecchymoses. No other rashes or lesions visualized on exposed  skin.  Musculoskeletal: There is no redness, warmth, or swelling of the joints. Moves all four extremities spontaneously. 1+ edema in BLE, unchanged  Neurologic: Alert, oriented to self and time of year, place (hospital though says [place]). Cranial nerves II-XII are grossly intact.  Neuropsychiatric: General: normal, calm, and normal eye contact  Level of consciousness: alert / normal  Affect: normal and pleasant    Medical Decision Making       Please see A&P for additional details of medical decision making.      Data     I have personally reviewed the following data over the past 24 hrs:    7.9  \   9.9 (L)   / 269     137 101 22.3 /  114 (H)   3.8 26 0.92 \       Imaging results reviewed over the past 24 hrs:   No results found for this or any previous visit (from the past 24 hours).

## 2025-03-26 NOTE — PLAN OF CARE
Problem: Adult Inpatient Plan of Care  Goal: Plan of Care Review  Outcome: Progressing   Goal Outcome Evaluation:       Pt alert and oriented,forgetful,[place],denied having any pain,sats low 90's on 2 L oxygen per NC,no nausea or vomiting this shift,slept most of the night.

## 2025-03-26 NOTE — PROGRESS NOTES
Maranda was oriented to place and self but was confused about date and exact situation about why she was here. She expressed concern about being moved to a facility that was not her home and was frustrated about not knowing what would happen to her. After looking into it I was able to confirm that she would be going to the same location but getting more care. She was grateful for the clarification. I did need to reiterate a few times so she understood this information.  Maranda experiences anxiety when she isn't sure what is happening and wants to make sure she knows she will be cared for.   Maranda moved to her bed from her chair around 7:30 and was resting comfortably when I left.     Shayy

## 2025-03-26 NOTE — PLAN OF CARE
Occupational Therapy Discharge Summary    Reason for therapy discharge:    Discharged to home.    Progress towards therapy goal(s). See goals on Care Plan in [facility] electronic health record for goal details.  Goals not met.  Barriers to achieving goals:   discharge from facility.    Therapy recommendation(s):    Continued therapy is recommended.  Rationale/Recommendations:  OT recommended TCU, pt is going home, recommend home OT.

## 2025-03-26 NOTE — PROGRESS NOTES
Care Management Follow Up    Length of Stay (days): 2    Expected Discharge Date: 03/26/2025     Concerns to be Addressed: Care progression - discharge planning     Patient plan of care discussed at interdisciplinary rounds: Yes    Anticipated Discharge Disposition: PT rec home with assist; home with home care and OT rec Transitional Care     Anticipated Discharge Services:  TBD  Anticipated Discharge DME:  NA    Patient/family educated on Medicare website which has current facility and service quality ratings: yes  Education Provided on the Discharge Plan: Yes per team  Patient/Family in Agreement with the Plan: TBD    Referrals Placed by CM/SW:  NA  Private pay costs discussed: Not applicable    Discussed  Partnership in Safe Discharge Planning  document with patient/family: No     Handoff Completed: No, handoff not indicated or clinically appropriate    Additional Information:  Rec'd another CM/SW IP Consult: Elevated Risk Score.  CM already following    Family wanted to know what [facility] need for patient to return.    Contacts:  Carmella, daughter: [phone]  Tiffani Traylor [facility] ([facility])  Phone: [phone]  JAROD Arboleda Complex Case Manager - [facility]: [phone]  [facility]: [phone]    Next Steps: RNCM to follow for medical progression, recommendations, and final discharge plan.     Elisa Ely RN     Met with patient at bedside to discuss PT/OT recs: PT rec home with assist; home with home care and OT rec Transitional Care    Patient declined TCU placement. She states she wants to return to [facility] and agreed for home care. Patient reported she has been deteriorating for the last two years. Her legs are weak, but she does have a wheelchair for use.     Per provider, Dr. LORETTA Calderon, patient maybe ready today. Plan for repeat chest X-ray, pending the result, patient maybe ready to return to [facility], if they can accept her.    1010 called Tiffani Traylor at [facility] ([facility]) and left a  message for Terence.    Called patient's daughter, Carmella, to update. Carmella states patient is not on oxygen at baseline. She   Family can transport      1130 rec'd a voicemail from Norma regarding the anticipated discharge today [phone]  Fax updated care/therapy notes [phone]    Printed and faxed the last 24 hours care/therapy notes    Called and spoke with Norma. Norma said patient is not on home oxygen and would need oxygen order to go home with patient. Norma will review the notes and update if patient can return.  Informed Norma, patient agreed for home care. Ordered RN/PT/OT.   [facility] accepted    1208 Dr. Johnson called and said Chest X-ray is good, but will give diuretic and will reassess in a few hours. Patient may still be ready to discharge to long-term later today. Will need to know latest time long-term can accept patient?    Called Tiffani Traylor at [facility]) and spoke with Nroma. She prefer to know by 1400 if patient will discharge or not. Patient can arrive before 1900. They do not have nursing staff not available 24/7.    Message sent to update Dr. Calderon    1350 per Dr. Calderon, patient is clear to discharge today. Working on discharge orders.   Called Norma and left a message, patient clear to discharge.   Called patient's daughter, Carmella, to update. She can transport when patient is ready.     1427 called Tiffani Traylor at [facility] multiple times with no response.   Sent and faxed the discharge orders    1435 called Norma with  Tiffani Traylor at [facility] and left another message.     1439 rec'd a message from Dr. Calderon, Nursing just let me know they can't complete the home oxygen assessment because patient is unable to walk. Baseline currently is 2 liters.    1455 rec'd a call from Norma. She rec'd the discharge orders, but cannot accept current oxygen order. The oxygen order needs to have a specific liter. If not able to discharge call [phone]  nursing station.    Sent a message to Dr. Calderon regarding the above.    Spoke with bedside nurse, Carol KING and she states patient was not able to tolerate home oxygen testing. When she took off the oxygen patient drop to 88%.     Informed Carol patient at baseline does not use oxygen.    Called and spoke with patient's daughter, Carmella, and she verified patient does not walk. Informed Carmella discharge orders are in and patient can be transported when home oxygen is available.     Called and spoke with Norma, she states at baseline patient can walk with her walker, if needed the staff can put patient in the walker.  Norma is OK for patient to return once the patient has the home oxygen    Called to update bedside nurse to call the daughter once the home oxygen is delivered.

## 2025-03-26 NOTE — PROGRESS NOTES
Patient has been assessed for Home Oxygen needs. Oxygen readings:    *Pulse oximetry (SpO2) = 87% on room air at rest while awake.    *SpO2 improved to 95% on 2 liters/minute at rest.    *SpO2 = % on room air during activity/with exercise.    *SpO2 improved to % on liters/minute during activity/with exercise.

## 2025-03-26 NOTE — PLAN OF CARE
Physical Therapy Discharge Summary    Reason for therapy discharge:    Discharged to home with home therapy.    Progress towards therapy goal(s). See goals on Care Plan in [facility] electronic health record for goal details.  Goals not met.  Barriers to achieving goals:   discharge from facility.    Therapy recommendation(s):    Continued therapy is recommended.  Rationale/Recommendations:  Continue with home PT as pt is progressing towards goals.

## 2025-03-27 ENCOUNTER — PATIENT OUTREACH (OUTPATIENT)
Dept: CARE COORDINATION | Facility: CLINIC | Age: OVER 89
End: 2025-03-27
Payer: MEDICARE

## 2025-03-27 LAB
ATRIAL RATE - MUSE: 102 BPM
DIASTOLIC BLOOD PRESSURE - MUSE: 72 MMHG
INTERPRETATION ECG - MUSE: NORMAL
P AXIS - MUSE: NORMAL DEGREES
PR INTERVAL - MUSE: NORMAL MS
QRS DURATION - MUSE: 114 MS
QT - MUSE: 388 MS
QTC - MUSE: 495 MS
R AXIS - MUSE: 17 DEGREES
SYSTOLIC BLOOD PRESSURE - MUSE: 156 MMHG
T AXIS - MUSE: -35 DEGREES
VENTRICULAR RATE- MUSE: 98 BPM

## 2025-03-27 NOTE — PROGRESS NOTES
Connected Care Resource Center    Background: Transitional Care Management program identified per system criteria and reviewed by [facility] team for possible outreach.    Assessment: Upon chart review, [facility] Team member will not proceed with patient outreach related to this episode of Transitional Care Management program due to reason below:    Patient has discharged to a Memory Care, Long-term Care, Assisted Living or Group Home where patient is receiving on-site support with their daily cares, including support with hospital follow up plan.    Plan: Transitional Care Management episode addressed appropriately per reason noted above.      Teresa Fritz MA  Connected Care Resource [place], [facility]    *Connected Care Resource Team does NOT follow patient ongoing. Referrals are identified based on internal discharge reports and the outreach is to ensure patient has an understanding of their discharge instructions.

## 2025-03-28 ENCOUNTER — MEDICAL CORRESPONDENCE (OUTPATIENT)
Dept: HEALTH INFORMATION MANAGEMENT | Facility: CLINIC | Age: OVER 89
End: 2025-03-28
Payer: MEDICARE

## 2025-03-31 LAB
ANION GAP SERPL CALCULATED.3IONS-SCNC: 12 MMOL/L (ref 7–15)
BUN SERPL-MCNC: 21.3 MG/DL (ref 8–23)
CALCIUM SERPL-MCNC: 9.2 MG/DL (ref 8.8–10.4)
CHLORIDE SERPL-SCNC: 101 MMOL/L (ref 98–107)
CREAT SERPL-MCNC: 0.8 MG/DL (ref 0.51–0.95)
EGFRCR SERPLBLD CKD-EPI 2021: 70 ML/MIN/1.73M2
ERYTHROCYTE [DISTWIDTH] IN BLOOD BY AUTOMATED COUNT: 12.6 % (ref 10–15)
GLUCOSE SERPL-MCNC: 113 MG/DL (ref 70–99)
HCO3 SERPL-SCNC: 28 MMOL/L (ref 22–29)
HCT VFR BLD AUTO: 35.9 % (ref 35–47)
HGB BLD-MCNC: 11.1 G/DL (ref 11.7–15.7)
MCH RBC QN AUTO: 30.7 PG (ref 26.5–33)
MCHC RBC AUTO-ENTMCNC: 30.9 G/DL (ref 31.5–36.5)
MCV RBC AUTO: 99 FL (ref 78–100)
PLATELET # BLD AUTO: 347 10E3/UL (ref 150–450)
POTASSIUM SERPL-SCNC: 4.3 MMOL/L (ref 3.4–5.3)
RBC # BLD AUTO: 3.61 10E6/UL (ref 3.8–5.2)
SODIUM SERPL-SCNC: 141 MMOL/L (ref 135–145)
WBC # BLD AUTO: 9.1 10E3/UL (ref 4–11)

## 2025-03-31 PROCEDURE — 85027 COMPLETE CBC AUTOMATED: CPT | Mod: ORL | Performed by: INTERNAL MEDICINE

## 2025-03-31 PROCEDURE — 36415 COLL VENOUS BLD VENIPUNCTURE: CPT | Mod: ORL | Performed by: INTERNAL MEDICINE

## 2025-03-31 PROCEDURE — 80048 BASIC METABOLIC PNL TOTAL CA: CPT | Mod: ORL | Performed by: INTERNAL MEDICINE

## 2025-03-31 PROCEDURE — P9603 ONE-WAY ALLOW PRORATED MILES: HCPCS | Mod: ORL | Performed by: INTERNAL MEDICINE

## 2025-04-02 ENCOUNTER — HOSPITAL ENCOUNTER (INPATIENT)
Facility: HOSPITAL | Age: OVER 89
End: 2025-04-02
Attending: EMERGENCY MEDICINE | Admitting: STUDENT IN AN ORGANIZED HEALTH CARE EDUCATION/TRAINING PROGRAM
Payer: MEDICARE

## 2025-04-02 ENCOUNTER — APPOINTMENT (OUTPATIENT)
Dept: CT IMAGING | Facility: HOSPITAL | Age: OVER 89
DRG: 196 | End: 2025-04-02
Attending: EMERGENCY MEDICINE
Payer: MEDICARE

## 2025-04-02 DIAGNOSIS — R09.02 HYPOXIA: ICD-10-CM

## 2025-04-02 DIAGNOSIS — J69.0 ASPIRATION PNEUMONIA DUE TO REGURGITATED FOOD, UNSPECIFIED LATERALITY, UNSPECIFIED PART OF LUNG (H): ICD-10-CM

## 2025-04-02 DIAGNOSIS — J47.0 BRONCHIECTASIS WITH ACUTE LOWER RESPIRATORY INFECTION (H): ICD-10-CM

## 2025-04-02 DIAGNOSIS — J84.112 IPF (IDIOPATHIC PULMONARY FIBROSIS) (H): ICD-10-CM

## 2025-04-02 DIAGNOSIS — T14.8XXA OPEN WOUND: Primary | ICD-10-CM

## 2025-04-02 DIAGNOSIS — J84.9 ILD (INTERSTITIAL LUNG DISEASE) (H): ICD-10-CM

## 2025-04-02 DIAGNOSIS — J96.21 ACUTE AND CHRONIC RESPIRATORY FAILURE WITH HYPOXIA (H): ICD-10-CM

## 2025-04-02 LAB
ANION GAP SERPL CALCULATED.3IONS-SCNC: 10 MMOL/L (ref 7–15)
BASE EXCESS BLDV CALC-SCNC: 9.5 MMOL/L (ref -3–3)
BASOPHILS # BLD AUTO: 0 10E3/UL (ref 0–0.2)
BASOPHILS NFR BLD AUTO: 0 %
BUN SERPL-MCNC: 17.7 MG/DL (ref 8–23)
CALCIUM SERPL-MCNC: 9.2 MG/DL (ref 8.8–10.4)
CHLORIDE SERPL-SCNC: 98 MMOL/L (ref 98–107)
CREAT SERPL-MCNC: 0.89 MG/DL (ref 0.51–0.95)
EGFRCR SERPLBLD CKD-EPI 2021: 61 ML/MIN/1.73M2
EOSINOPHIL # BLD AUTO: 0.1 10E3/UL (ref 0–0.7)
EOSINOPHIL NFR BLD AUTO: 1 %
ERYTHROCYTE [DISTWIDTH] IN BLOOD BY AUTOMATED COUNT: 12.6 % (ref 10–15)
FLUAV RNA SPEC QL NAA+PROBE: NEGATIVE
FLUBV RNA RESP QL NAA+PROBE: NEGATIVE
GLUCOSE SERPL-MCNC: 116 MG/DL (ref 70–99)
HCO3 BLDV-SCNC: 36 MMOL/L (ref 21–28)
HCO3 SERPL-SCNC: 35 MMOL/L (ref 22–29)
HCT VFR BLD AUTO: 35.9 % (ref 35–47)
HGB BLD-MCNC: 11.6 G/DL (ref 11.7–15.7)
IMM GRANULOCYTES # BLD: 0.1 10E3/UL
IMM GRANULOCYTES NFR BLD: 1 %
LYMPHOCYTES # BLD AUTO: 0.5 10E3/UL (ref 0.8–5.3)
LYMPHOCYTES NFR BLD AUTO: 6 %
MCH RBC QN AUTO: 30.5 PG (ref 26.5–33)
MCHC RBC AUTO-ENTMCNC: 32.3 G/DL (ref 31.5–36.5)
MCV RBC AUTO: 95 FL (ref 78–100)
MONOCYTES # BLD AUTO: 0.3 10E3/UL (ref 0–1.3)
MONOCYTES NFR BLD AUTO: 4 %
NEUTROPHILS # BLD AUTO: 7 10E3/UL (ref 1.6–8.3)
NEUTROPHILS NFR BLD AUTO: 89 %
NRBC # BLD AUTO: 0 10E3/UL
NRBC BLD AUTO-RTO: 0 /100
NT-PROBNP SERPL-MCNC: 2555 PG/ML (ref 0–1800)
O2/TOTAL GAS SETTING VFR VENT: 40 %
OXYHGB MFR BLDV: 34 % (ref 70–75)
PCO2 BLDV: 54 MM HG (ref 40–50)
PH BLDV: 7.43 [PH] (ref 7.32–7.43)
PLATELET # BLD AUTO: 319 10E3/UL (ref 150–450)
PO2 BLDV: 22 MM HG (ref 25–47)
POTASSIUM SERPL-SCNC: 4.4 MMOL/L (ref 3.4–5.3)
PROCALCITONIN SERPL IA-MCNC: 0.17 NG/ML
RBC # BLD AUTO: 3.8 10E6/UL (ref 3.8–5.2)
RSV RNA SPEC NAA+PROBE: NEGATIVE
SAO2 % BLDV: 34.5 % (ref 70–75)
SARS-COV-2 RNA RESP QL NAA+PROBE: NEGATIVE
SODIUM SERPL-SCNC: 143 MMOL/L (ref 135–145)
TROPONIN T SERPL HS-MCNC: 23 NG/L
TROPONIN T SERPL HS-MCNC: 27 NG/L
WBC # BLD AUTO: 7.9 10E3/UL (ref 4–11)

## 2025-04-02 PROCEDURE — 250N000011 HC RX IP 250 OP 636: Mod: JZ | Performed by: INTERNAL MEDICINE

## 2025-04-02 PROCEDURE — 87633 RESP VIRUS 12-25 TARGETS: CPT | Performed by: INTERNAL MEDICINE

## 2025-04-02 PROCEDURE — 84484 ASSAY OF TROPONIN QUANT: CPT | Performed by: EMERGENCY MEDICINE

## 2025-04-02 PROCEDURE — 82310 ASSAY OF CALCIUM: CPT | Performed by: EMERGENCY MEDICINE

## 2025-04-02 PROCEDURE — 84145 PROCALCITONIN (PCT): CPT | Performed by: EMERGENCY MEDICINE

## 2025-04-02 PROCEDURE — 99285 EMERGENCY DEPT VISIT HI MDM: CPT | Mod: 25

## 2025-04-02 PROCEDURE — 85018 HEMOGLOBIN: CPT | Performed by: EMERGENCY MEDICINE

## 2025-04-02 PROCEDURE — 71250 CT THORAX DX C-: CPT

## 2025-04-02 PROCEDURE — 93005 ELECTROCARDIOGRAM TRACING: CPT | Performed by: EMERGENCY MEDICINE

## 2025-04-02 PROCEDURE — 83880 ASSAY OF NATRIURETIC PEPTIDE: CPT | Performed by: EMERGENCY MEDICINE

## 2025-04-02 PROCEDURE — 250N000013 HC RX MED GY IP 250 OP 250 PS 637

## 2025-04-02 PROCEDURE — 80048 BASIC METABOLIC PNL TOTAL CA: CPT | Performed by: EMERGENCY MEDICINE

## 2025-04-02 PROCEDURE — 82805 BLOOD GASES W/O2 SATURATION: CPT | Performed by: EMERGENCY MEDICINE

## 2025-04-02 PROCEDURE — 36415 COLL VENOUS BLD VENIPUNCTURE: CPT | Performed by: EMERGENCY MEDICINE

## 2025-04-02 PROCEDURE — 87486 CHLMYD PNEUM DNA AMP PROBE: CPT | Performed by: INTERNAL MEDICINE

## 2025-04-02 PROCEDURE — 99222 1ST HOSP IP/OBS MODERATE 55: CPT | Performed by: INTERNAL MEDICINE

## 2025-04-02 PROCEDURE — 250N000011 HC RX IP 250 OP 636

## 2025-04-02 PROCEDURE — 87637 SARSCOV2&INF A&B&RSV AMP PRB: CPT | Performed by: EMERGENCY MEDICINE

## 2025-04-02 PROCEDURE — 85025 COMPLETE CBC W/AUTO DIFF WBC: CPT | Performed by: EMERGENCY MEDICINE

## 2025-04-02 PROCEDURE — 120N000001 HC R&B MED SURG/OB

## 2025-04-02 PROCEDURE — 99223 1ST HOSP IP/OBS HIGH 75: CPT | Mod: AI

## 2025-04-02 PROCEDURE — 87581 M.PNEUMON DNA AMP PROBE: CPT | Performed by: INTERNAL MEDICINE

## 2025-04-02 RX ORDER — METHYLPREDNISOLONE SODIUM SUCCINATE 40 MG/ML
40 INJECTION INTRAMUSCULAR; INTRAVENOUS EVERY 12 HOURS
Status: DISPENSED | OUTPATIENT
Start: 2025-04-02

## 2025-04-02 RX ORDER — BUSPIRONE HYDROCHLORIDE 10 MG/1
10 TABLET ORAL 2 TIMES DAILY
Status: DISPENSED | OUTPATIENT
Start: 2025-04-02

## 2025-04-02 RX ORDER — PIPERACILLIN SODIUM, TAZOBACTAM SODIUM 3; .375 G/15ML; G/15ML
3.38 INJECTION, POWDER, LYOPHILIZED, FOR SOLUTION INTRAVENOUS ONCE
Status: COMPLETED | OUTPATIENT
Start: 2025-04-02 | End: 2025-04-02

## 2025-04-02 RX ORDER — ACETAMINOPHEN 500 MG
1000 TABLET ORAL 2 TIMES DAILY
Status: ON HOLD | COMMUNITY

## 2025-04-02 RX ORDER — MIRTAZAPINE 7.5 MG/1
7.5 TABLET, FILM COATED ORAL AT BEDTIME
Status: DISPENSED | OUTPATIENT
Start: 2025-04-02

## 2025-04-02 RX ORDER — FUROSEMIDE 20 MG/1
60 TABLET ORAL DAILY
Status: DISPENSED | OUTPATIENT
Start: 2025-04-02

## 2025-04-02 RX ORDER — LIDOCAINE 40 MG/G
CREAM TOPICAL
Status: ACTIVE | OUTPATIENT
Start: 2025-04-02

## 2025-04-02 RX ORDER — ENOXAPARIN SODIUM 100 MG/ML
40 INJECTION SUBCUTANEOUS EVERY 24 HOURS
Status: DISPENSED | OUTPATIENT
Start: 2025-04-02

## 2025-04-02 RX ORDER — ONDANSETRON 4 MG/1
4 TABLET, ORALLY DISINTEGRATING ORAL EVERY 6 HOURS PRN
Status: ACTIVE | OUTPATIENT
Start: 2025-04-02

## 2025-04-02 RX ORDER — LOPERAMIDE HYDROCHLORIDE 2 MG/1
2 CAPSULE ORAL EVERY MORNING
Status: ON HOLD | COMMUNITY

## 2025-04-02 RX ORDER — ESTRADIOL 0.1 MG/G
1 CREAM VAGINAL
Status: DISPENSED | OUTPATIENT
Start: 2025-04-02

## 2025-04-02 RX ORDER — LOPERAMIDE HYDROCHLORIDE 2 MG/1
2 CAPSULE ORAL EVERY MORNING
Status: DISCONTINUED | OUTPATIENT
Start: 2025-04-03 | End: 2025-04-02

## 2025-04-02 RX ORDER — AMOXICILLIN 250 MG
2 CAPSULE ORAL 2 TIMES DAILY PRN
Status: ACTIVE | OUTPATIENT
Start: 2025-04-02

## 2025-04-02 RX ORDER — ONDANSETRON 2 MG/ML
4 INJECTION INTRAMUSCULAR; INTRAVENOUS EVERY 6 HOURS PRN
Status: ACTIVE | OUTPATIENT
Start: 2025-04-02

## 2025-04-02 RX ORDER — ACETAMINOPHEN 650 MG/1
650 SUPPOSITORY RECTAL EVERY 4 HOURS PRN
Status: ACTIVE | OUTPATIENT
Start: 2025-04-02

## 2025-04-02 RX ORDER — AMOXICILLIN 250 MG
1 CAPSULE ORAL 2 TIMES DAILY PRN
Status: ACTIVE | OUTPATIENT
Start: 2025-04-02

## 2025-04-02 RX ORDER — CALCIUM CARBONATE 500 MG/1
1000 TABLET, CHEWABLE ORAL 4 TIMES DAILY PRN
Status: ACTIVE | OUTPATIENT
Start: 2025-04-02

## 2025-04-02 RX ORDER — LOPERAMIDE HYDROCHLORIDE 2 MG/1
2 CAPSULE ORAL 3 TIMES DAILY PRN
Status: ACTIVE | OUTPATIENT
Start: 2025-04-02

## 2025-04-02 RX ORDER — GABAPENTIN 100 MG/1
100 CAPSULE ORAL 2 TIMES DAILY
Status: DISPENSED | OUTPATIENT
Start: 2025-04-02

## 2025-04-02 RX ORDER — ESTRADIOL 0.1 MG/G
CREAM VAGINAL
Status: ON HOLD | COMMUNITY

## 2025-04-02 RX ORDER — POTASSIUM CHLORIDE 750 MG/1
10 TABLET, EXTENDED RELEASE ORAL 2 TIMES DAILY
Status: DISPENSED | OUTPATIENT
Start: 2025-04-02

## 2025-04-02 RX ORDER — ACETAMINOPHEN 325 MG/1
650 TABLET ORAL EVERY 4 HOURS PRN
Status: ACTIVE | OUTPATIENT
Start: 2025-04-02

## 2025-04-02 RX ORDER — PROCHLORPERAZINE MALEATE 5 MG/1
5 TABLET ORAL EVERY 6 HOURS PRN
Status: ACTIVE | OUTPATIENT
Start: 2025-04-02

## 2025-04-02 RX ADMIN — ENOXAPARIN SODIUM 40 MG: 40 INJECTION SUBCUTANEOUS at 15:59

## 2025-04-02 RX ADMIN — POTASSIUM CHLORIDE 10 MEQ: 750 TABLET, EXTENDED RELEASE ORAL at 20:05

## 2025-04-02 RX ADMIN — PIPERACILLIN AND TAZOBACTAM 3.38 G: 3; .375 INJECTION, POWDER, FOR SOLUTION INTRAVENOUS at 20:09

## 2025-04-02 RX ADMIN — GABAPENTIN 100 MG: 100 CAPSULE ORAL at 20:05

## 2025-04-02 RX ADMIN — SERTRALINE HYDROCHLORIDE 150 MG: 100 TABLET ORAL at 16:29

## 2025-04-02 RX ADMIN — METHYLPREDNISOLONE SODIUM SUCCINATE 40 MG: 40 INJECTION INTRAMUSCULAR; INTRAVENOUS at 16:31

## 2025-04-02 RX ADMIN — FUROSEMIDE 60 MG: 20 TABLET ORAL at 15:57

## 2025-04-02 ASSESSMENT — COLUMBIA-SUICIDE SEVERITY RATING SCALE - C-SSRS
6. HAVE YOU EVER DONE ANYTHING, STARTED TO DO ANYTHING, OR PREPARED TO DO ANYTHING TO END YOUR LIFE?: NO
2. HAVE YOU ACTUALLY HAD ANY THOUGHTS OF KILLING YOURSELF IN THE PAST MONTH?: NO
1. IN THE PAST MONTH, HAVE YOU WISHED YOU WERE DEAD OR WISHED YOU COULD GO TO SLEEP AND NOT WAKE UP?: NO

## 2025-04-02 ASSESSMENT — ACTIVITIES OF DAILY LIVING (ADL)
ADLS_ACUITY_SCORE: 61
DEPENDENT_IADLS:: CLEANING;COOKING;LAUNDRY;SHOPPING;MEDICATION MANAGEMENT;TRANSPORTATION;MONEY MANAGEMENT
ADLS_ACUITY_SCORE: 61

## 2025-04-02 NOTE — ED TRIAGE NOTES
Pt is from assisted living and arrives via ambulance with reports of shortness of breath and low oxygen reading. Pt was started on 3L nasal canula after previous ER visit dx of UTI and respiratory failure. Pt was having a lot of difficulty breathing yesterday and today had a low oxygen reading reading in the 80's on the 3L. Ambulance was called and medics increased the oxygen to 4L. Upon arrival, pt states that she is feeling less short of breath. Pt is 88% on pulse oximetry when checked initially and we have increased the therapy to 5L and are now getting reading of 94%. Pt denies any chest pain. Pt states that she has a sore on her lower back, upper bottom and that she always has some pain there, but right now the way she is laying it is low at 2/10.      Triage Assessment (Adult)       Row Name 04/02/25 1022          Triage Assessment    Airway WDL WDL        Respiratory WDL    Respiratory WDL X  sob        Skin Circulation/Temperature WDL    Skin Circulation/Temperature WDL WDL        Cardiac WDL    Cardiac WDL WDL        Peripheral/Neurovascular WDL    Peripheral Neurovascular WDL WDL        Cognitive/Neuro/Behavioral WDL    Cognitive/Neuro/Behavioral WDL WDL

## 2025-04-02 NOTE — ED TRIAGE NOTES
Triage Assessment (Adult)       Row Name 04/02/25 1022          Triage Assessment    Airway WDL WDL        Respiratory WDL    Respiratory WDL X  sob        Skin Circulation/Temperature WDL    Skin Circulation/Temperature WDL WDL        Cardiac WDL    Cardiac WDL WDL        Peripheral/Neurovascular WDL    Peripheral Neurovascular WDL WDL        Cognitive/Neuro/Behavioral WDL    Cognitive/Neuro/Behavioral WDL WDL

## 2025-04-02 NOTE — PLAN OF CARE
Goal Outcome Evaluation:         CM will continue to monitor progression of care, review team recommendations and provide discharge planning assist as needed.

## 2025-04-02 NOTE — ED NOTES
Bed: JNED-10  Expected date: 4/2/25  Expected time:   Means of arrival:   Comments:  Seth   89 female  SOB  4 liters

## 2025-04-02 NOTE — PHARMACY-ADMISSION MEDICATION HISTORY
Pharmacist Admission Medication History    Admission medication history is complete. The information provided in this note is only as accurate as the sources available at the time of the update.    Information Source(s): Facility (U/NH/) medication list/MAR    Pertinent Information: None    Changes made to PTA medication list:  Added: estradiol  Deleted: None  Changed: acetaminophen, loperamide. Systane    Allergies reviewed and updates made in EHR: yes    Medication History Completed By: Elke Garrett [facility] 4/2/2025 1:59 PM    PTA Med List   Medication Sig Last Dose/Taking    acetaminophen (TYLENOL) 500 MG tablet Take 1,000 mg by mouth 2 times daily. 4/1/2025 Bedtime    acetaminophen (TYLENOL) 500 MG tablet Take 1,000 mg by mouth 3 times daily as needed for mild pain. Taking As Needed    alendronate (FOSAMAX) 70 MG tablet Take 70 mg by mouth every 7 days. 3/29/2025    busPIRone (BUSPAR) 10 MG tablet Take 10 mg by mouth 2 times daily. 4/1/2025 Bedtime    cholecalciferol (VITAMIN D3) 25 mcg (1000 units) capsule Take 1 capsule by mouth daily. 4/1/2025 Morning    estradiol (ESTRACE) 0.1 MG/GM vaginal cream Place vaginally three times a week. MWF at bedtime (self-administered by patient) Past Week    fexofenadine (ALLEGRA) 180 MG tablet Take 180 mg by mouth daily as needed for allergies. Taking As Needed    fluticasone (FLONASE) 50 MCG/ACT nasal spray Spray 2 sprays into both nostrils daily as needed for allergies. Taking As Needed    furosemide (LASIX) 20 MG tablet Take 3 tablets (60 mg) by mouth daily. 4/1/2025 Morning    gabapentin (NEURONTIN) 100 MG capsule Take 100 mg by mouth 2 times daily. 4/1/2025 Bedtime    ketoconazole (NIZORAL) 2 % external cream Apply topically 2 times daily as needed for itching. Taking As Needed    loperamide (IMODIUM) 2 MG capsule Take 2 mg by mouth every morning. 4/1/2025 Morning    loperamide (IMODIUM) 2 MG capsule Take 2 mg by mouth 3 times daily as needed for diarrhea Taking As  Needed    mirtazapine (REMERON) 7.5 MG tablet Take 7.5 mg by mouth at bedtime. 4/1/2025 Bedtime    nystatin (MYCOSTATIN) 612190 UNIT/GM external powder Apply topically 2 times daily as needed for dry skin. Taking As Needed    polyethylene glycol-propylene glycol (SYSTANE ULTRA) 0.4-0.3 % SOLN ophthalmic solution Place 2 drops into both eyes 2 times daily. 4/1/2025 Morning    potassium chloride ER (K-TAB/KLOR-CON) 10 MEQ CR tablet Take 10 mEq by mouth 2 times daily. 4/1/2025 Bedtime    sertraline (ZOLOFT) 100 MG tablet Take 150 mg by mouth daily. 4/1/2025 Morning    zinc oxide (DESITIN) 20 % external ointment Apply topically 2 times daily as needed for dry skin or irritation (coccyx). Taking As Needed

## 2025-04-02 NOTE — CONSULTS
Pulmonary/Critical Care Consult Team Note    Maranda Downing,  1935, MRN [id number]  Admitting Dx: Hypoxia [R09.02]  ILD (interstitial lung disease) (H) [J84.9]  Date / Time of Admission:  2025 10:12 AM    Assessment/Plan: Maranda Downing is a 89 year old female with PMHx of UTI, HFpEF, RBBB, CAD, DM2, Fatty Liver, HTN, HLD, IBS, GERD, who presented with acute resp failure with hypoxia due to chronic aspiration.    Acute resp failure with hypoxia due to chronic aspiration.  - she told me when she was discharged there was one time she coughed up all her meds when she took them  - She is choking on the sip of water that I gave her  - Zosyn  - Steroids for the pneumonitis  - swallow eval  - strict NPO        Medical Care Time excluding procedures and family discussions greater than: 45 Minutes    Risk Factors Present on Admission:  Clinically Significant Risk Factors Present on Admission                   # Hypertension: Noted on problem list               # Financial/Environmental Concerns:         Code Status: No CPR- Do NOT Intubate         Gisela Paniagua DO  Pulmonary and Critical Care Attending  pgr [phone]    Allergies   Allergen Reactions    Codeine Nausea    Hydrocodone      Other reaction(s): GI Upset  nausea    Iodinated Contrast Media     Levofloxacin      Other reaction(s): Intolerance-Can't Take  Made leg pain worse.    Lisinopril Cough    Morphine Nausea and Vomiting     Other reaction(s): Nausea/Vomiting    Sulfa Antibiotics Nausea    Penicillins Rash       Meds: See MAR    Physical Exam:  BP (!) 150/65   Pulse 89   Temp 97.7  F (36.5  C) (Oral)   Resp 27   Wt 58.5 kg (129 lb)   SpO2 94%   BMI 23.59 kg/m    Intake/Output this shift:  No intake/output data recorded.  GEN: sitting up in bed, NAD  HEENT: MMM, NC in place  CVS: regular rhythm, no murmurs  RESP: bilateral rhonchi, no wheezing  ABD: Soft, No abdominal pain with palpation, no guarding, no rigidity  EXT: Warm, well  perfused, no LE edema -  NEURO:  she does not walk, moving her arms  PSYCH: pleasant    Pertinent Labs: Latest lab results in EHR personally reviewed.   CMP  Recent Labs   Lab 04/02/25  1116 03/31/25  1135    141   POTASSIUM 4.4 4.3   CHLORIDE 98 101   CO2 35* 28   ANIONGAP 10 12   * 113*   BUN 17.7 21.3   CR 0.89 0.80   GFRESTIMATED 61 70   HETAL 9.2 9.2     CBC  Recent Labs   Lab 04/02/25  1116 03/31/25  1135   WBC 7.9 9.1   RBC 3.80 3.61*   HGB 11.6* 11.1*   HCT 35.9 35.9   MCV 95 99   MCH 30.5 30.7   MCHC 32.3 30.9*   RDW 12.6 12.6    347     INRNo lab results found in last 7 days.  Arterial Blood Gas  Recent Labs   Lab 04/02/25  1116   O2PER 40       Cultures: not yet available.      Imaging: personally reviewed.     EXAM: CT CHEST W/O CONTRAST  LOCATION: [facility]  DATE: 4/2/2025     INDICATION: Worsening hypoxia and dyspnea  COMPARISON: Chest x-ray 3/26/2025, CT chest 20 2022 and older studies  TECHNIQUE: CT chest without IV contrast. Multiplanar reformats were obtained. Dose reduction techniques were used.  CONTRAST: None.     FINDINGS:   LUNGS AND PLEURA: Extensive, heterogeneous groundglass opacities are noted bilaterally, left greater than right with areas of segmental sparing posteriorly in the right upper and middle lobes and anteriorly in the right lower lobe. No intralobular septal   thickening. Changes are superimposed over chronic areas of subpleural reticulation, traction bronchiectasis and a few subpleural cysts in the left upper lobe anteriorly. There is a very small left pleural effusion.     MEDIASTINUM/AXILLAE: Less than 1 cm right thyroid nodule is unchanged. Main pulmonary artery is enlarged at 3.8 cm. Extensive calcification aorta and branches. No aneurysm. Extensive mitral annular calcifications.     CORONARY ARTERY CALCIFICATION: Mild.     UPPER ABDOMEN: Quite distended gallbladder with large gallstone in the neck and hyperdense bile and a few  layering smaller stones dependently. Appearance is unchanged and there are no inflammatory changes about the gallbladder. Scattered colonic   diverticulosis. Left renal cyst again noted. Aorta and branches are heavily calcified.     MUSCULOSKELETAL: Patient's developed new vertebral body compression fractures since 2022. There is a new vertebral plana at L1, compression of the superior endplate of L3 with sclerosis and 50% loss of vertebral body height. Compression fractures at T12   and T10 are unchanged.                                                                      IMPRESSION:   1.  Patient has developed heterogeneous, diffuse groundglass opacities without intralobular septal thickening, left greater than right. Changes are superimposed over underlying pulmonary fibrosis, non-UIP pattern as noted above. Pattern suggests acute   pneumonitis. Consider pulmonary consultation.  2.  Trace left effusion without  intralobular septal thickening to suggest failure.  3.  Enlarged main pulmonary artery consistent with pulmonary arterial hypertension.  4.  Compression fractures at L1 and L3, new since 2022.  5.  Cholelithiasis with very large gallstone in the gallbladder neck and no signs of acute inflammation, unchanged.  6.  Other noncritical findings as noted above.    Results for orders placed during the hospital encounter of 03/24/25    XR Chest Port 1 View    Narrative  EXAM: XR CHEST PORT 1 VIEW  LOCATION: [facility]  DATE: 3/26/2025    INDICATION: Difficulty weaning oxygen, evaluate for new changes  COMPARISON: X-ray 3/24/2025    Impression  IMPRESSION: Persistent low lung volumes. Stable central vascular congestion. Diffuse bilateral multilobar reticulation with superimposed patchy opacities. Slightly improved aeration within the left upper lobe and left midlung. Otherwise, no significant  change since the prior exam. No definite pleural effusion. Stable heart size. Heavily calcified  thoracic aorta.      Patient Active Problem List   Diagnosis    Type 2 diabetes mellitus with diabetic chronic kidney disease, unspecified CKD stage, unspecified whether long term insulin use (H)    Chronic diarrhea    Anxiety state    Allergic rhinitis    AK (actinic keratosis)    Acquired absence of other left toe(s)    Abdominal pain    Coronary artery disease of native artery of native heart with stable angina pectoris    Cystitis cystica    Constipation    Dysesthesia    Fall    Fatty liver    Fibromyalgia    Gastroesophageal reflux disease without esophagitis    Genital herpes    Hammertoe of left foot    Hypercalcemia    Hypercholesteremia    Hypertension    Hypokalemia    Hypoxia    Osteoporosis    OA (osteoarthritis) of knee    Nausea & vomiting    Mild cognitive impairment    Midline thoracic back pain    Irritable bowel syndrome    Overflow diarrhea    Vitamin B 12 deficiency    Stage 3 chronic kidney disease, unspecified whether stage 3a or 3b CKD (H)    Sensorineural hearing loss, bilateral    Rosacea    Right bundle branch block    Pulmonary hypertension (H)    PPD positive    Paroxysmal atrial tachycardia    Noninfectious gastroenteritis    Elevated troponin    Diverticular disease    Acute on chronic diastolic congestive heart failure (H)    Urinary retention    Acute cystitis without hematuria    History of aspiration pneumonia    Paroxysmal atrial fibrillation (H)    Acute and chronic respiratory failure with hypoxia (H)    Failure to attend appointment    Pneumonia of left lower lobe due to other aerobic gram-negative bacteria (H)    Sepsis due to undetermined organism (H)    Fresh blood passed per rectum    Bronchiectasis with acute lower respiratory infection (H)    Abnormal CT scan of lung    Meconium aspiration pneumonia, unspecified laterality, unspecified part of lung    Dysphagia, unspecified type    Acute pulmonary embolism, unspecified pulmonary embolism type, unspecified whether acute  cor pulmonale present (H)    Gastroesophageal reflux disease with esophagitis without hemorrhage    Acute deep vein thrombosis (DVT) of left lower extremity, unspecified vein (H)    Acute respiratory failure with hypoxia (H)    Congestive heart failure, unspecified HF chronicity, unspecified heart failure type (H)    Acute cystitis with hematuria    ILD (interstitial lung disease) (H)     Gisela Paniagua DO  Pulmonary and Critical Care Attending  pgr [phone]    Securely message with the Vocera Web Console (learn more here)

## 2025-04-02 NOTE — CONSULTS
Care Management Initial Consult    General Information  Assessment completed with: Patient, Children,    Type of CM/SW Visit: Initial Assessment    Primary Care Provider verified and updated as needed: Yes   Readmission within the last 30 days: lack of support   Return Category: Exacerbation of disease  Reason for Consult: discharge planning  Advance Care Planning: Advance Care Planning Reviewed: no concerns identified          Communication Assessment  Patient's communication style: spoken language (English or Bilingual)             Cognitive  Cognitive/Neuro/Behavioral: WDL                      Living Environment:   People in home: alone     Current living Arrangements: apartment, assisted living      Able to return to prior arrangements: other (see comments) (TBD)       Family/Social Support:  Care provided by: self, child(rema), other (see comments) (staff)  Provides care for: no one, unable/limited ability to care for self     Support system: Children          Description of Support System: Supportive, Involved         Current Resources:   Patient receiving home care services: Yes  Skilled Home Care Services: Physical Therapy, Occupational Therapy     Community Resources:    Equipment currently used at home: wheelchair, manual, grab bar, toilet, grab bar, tub/shower  Supplies currently used at home: Compression Stockings    Employment/Financial:  Employment Status: retired        Financial Concerns:             Does the patient's insurance plan have a 3 day qualifying hospital stay waiver?  No    Lifestyle & Psychosocial Needs:  Social Drivers of Health     Food Insecurity: Low Risk  (3/24/2025)    Food Insecurity     Within the past 12 months, did you worry that your food would run out before you got money to buy more?: No     Within the past 12 months, did the food you bought just not last and you didn t have money to get more?: No   Depression: Not at risk (9/15/2021)    Received from [employer] [facility]    PHQ-2     PHQ-2 TOTAL SCORE: 2   Housing Stability: Low Risk  (3/24/2025)    Housing Stability     Do you have housing? : Yes     Are you worried about losing your housing?: No   Tobacco Use: Low Risk  (7/31/2024)    Received from [facility] [employer] [facility]    Patient History     Smoking Tobacco Use: Never     Smokeless Tobacco Use: Never     Passive Exposure: Not on file   Financial Resource Strain: Low Risk  (3/24/2025)    Financial Resource Strain     Within the past 12 months, have you or your family members you live with been unable to get utilities (heat, electricity) when it was really needed?: No   Alcohol Use: Not on file   Transportation Needs: Low Risk  (3/24/2025)    Transportation Needs     Within the past 12 months, has lack of transportation kept you from medical appointments, getting your medicines, non-medical meetings or appointments, work, or from getting things that you need?: No   Physical Activity: Not on file   Interpersonal Safety: Low Risk  (3/24/2025)    Interpersonal Safety     Do you feel physically and emotionally safe where you currently live?: Yes     Within the past 12 months, have you been hit, slapped, kicked or otherwise physically hurt by someone?: No     Within the past 12 months, have you been humiliated or emotionally abused in other ways by your partner or ex-partner?: No   Stress: Not on file   Social Connections: Unknown (12/22/2021)    Received from [facility] [employer] [facility] [employer] [facility]    Social Connections     Frequency of Communication with Friends and Family: Not on file   Health Literacy: Not on file       Functional Status:  Prior to admission patient needed assistance:   Dependent ADLs:: Wheelchair-independent, Bathing, Dressing, Toileting  Dependent IADLs:: Cleaning, Cooking, Laundry, Shopping, Medication Management, Transportation, Money Management       Mental Health Status:          Chemical Dependency Status:                 Values/Beliefs:  Spiritual, Cultural Beliefs, Orthodox Practices, Values that affect care:                 Discussed  Partnership in Safe Discharge Planning  document with patient/family: No    Additional Information:  Met with patient, daughter Jessy and son to review role of care management, progression of care and possible need for services at discharge, including OP services, home care, or skilled nursing care. Patient alert, oriented and engaged in the conversation. Writer then verified patient demographics and updated any changes needed in the patient chart.    Patient lives at [facility] in [place]., Assistance with adls/iadls. Started [facility] homecare after last discharge last week.TCU had been recommended but patient declined. She states she will consider [facility] this time  Next Steps: PT/OT rec    Kami Veloz RN

## 2025-04-02 NOTE — H&P
[facility]    History and Physical - Hospitalist Service       Date of Admission:  4/2/2025    Assessment & Plan      Maranda Downing is a 89 year old female admitted on 4/2/2025. She has a history of recurrent UTI, HFpEF, RBBB, CAD, DM2, Fatty Liver, HTN, HLD, IBS, GERD, Thyroid Nodule who is admitted for worsening shortness of breath and hypoxia likely secondary to chronic lung disease.     Acute Hypoxic Respiratory Failure  Likely ILD  Pulmonary HTN  CXR during previous admission with significant interstitial opacities B/L, unclear if underlying pre-existing ILD vs acute changes. Discharged on 2L NC a week ago. Now worsening for 4 days on 4-5L NC on admission with nonproductive cough, CT on admission with worsening groundglass opacities and fibrosis, along with pneumonitis. Pulmonary HTN seen as well, echocardiogram 2021 with LVEF 55-60%. Rales diffusely on lung exam though related to likely lung disease.  Patient and daughter do report more coughing when eating and drinking lately, possible contribution of aspiration contributing. Otherwise vitally normal, no leukocytosis, procal normal reassuring against new acute infection. VBG with compensated respiratory acidosis.   - Pulmonology consulted, appreciate recs  - SLP   - Echocardiogram  - Oxygen as needed, wean as able   - PT/OT  - Care management    HFpEF  Paroxysmal Atrial Fibrillation  Elevated troponin  Afib noted on chart review, rate controlled on admission and not on meds. Not on anticoagulation with history of recurrent bleeding. Troponin 27 -> 23 on admission. BNP 2,500 on admission, similar to 1 week ago. Echocardiogram 2021 with LVEF 55-60%. Otherwise does not appear significantly hypervolemic on exam.  - PTA Kcl capsules 10 mEq BID  - PTA lasix 60 mg PO daily     Intermittent Vomiting  Patient notes she does sometimes vomit in AM, has not been a problem as much recently. Not nausea now. Could contribute to aspiration  -  Will continue to monitor     Chronic T10, T12, L1, L3 Fractures  Noted on CXR, known to patient, stable.     T2DM  Not on PTA meds, multiple A1cs in last year under 6, most recent 9 months ago 5.5. Glucose on admission 116.   - Will monitor daily BMP, consider sliding scale insulin if needed or steroids given     Chronic conditions:  Depression/Anxiety - PTA sertraline, buspar, mirtazapine  Chronic diarrhea - PTA PRN loperamide  HTN, HLD, GERD, CAD - not on PTA meds         Diet: Combination Diet Low Saturated Fat Na <2400mg Diet, No Caffeine Diet  DVT Prophylaxis: Enoxaparin (Lovenox) SQ  Driver Catheter: Not present  Fluids: PO  Lines: None     Cardiac Monitoring: None  Code Status: No CPR- Do NOT Intubate    Clinically Significant Risk Factors Present on Admission                   # Hypertension: Noted on problem list               # Financial/Environmental Concerns:           Disposition Plan      Expected Discharge Date: 04/04/2025                The patient's care was discussed with the Attending Physician, Dr. Torres .      Donald Calderon MD  Hospitalist Service  [facility]  Securely message with [employer] (more info)  Text page via [facility] Paging/Directory   ______________________________________________________________________    Chief Complaint   Shortness of breath    History is obtained from the patient    History of Present Illness   Maranda Downing is a 89 year old female admitted on 4/2/2025. She has a history of recurrent UTI, HFpEF, RBBB, CAD, DM2, Fatty Liver, HTN, HLD, IBS, GERD, Thyroid Nodule who is admitted for worsening shortness of breath and hypoxia    Patient was recently admitted 3/24 to 3/26 for urosepsis, she also was hypoxic at that time with imaging questioning chronic ILD that was exacerbated by her acute illness.  She was unable to come off the oxygen during hospital stay and was discharged on 2 L nasal cannula back to her assisted living facility.    She had a  couple of good days when she got back there, however she has been worsening more so in the last 4 days.  Her shortness of breath has gotten worse, along with a worsening dry cough as well. No orthopnea. Her oxygen needs have also increased during last couple days.  No fevers or chills with any of this, no other runny nose or sick symptoms.  No chest pain, no significant abdominal pain.  Does sometimes have discomfort before and after eating.    Her urinary symptoms have improved, no dysuria now.       Past Medical History    No past medical history on file.    Past Surgical History   No past surgical history on file.    Prior to Admission Medications   Prior to Admission Medications   Prescriptions Last Dose Informant Patient Reported? Taking?   acetaminophen (TYLENOL) 500 MG tablet   Yes Yes   Sig: Take 1,000 mg by mouth 3 times daily as needed for mild pain.   acetaminophen (TYLENOL) 500 MG tablet 4/1/2025 Bedtime  Yes Yes   Sig: Take 1,000 mg by mouth 2 times daily.   alendronate (FOSAMAX) 70 MG tablet 3/29/2025  Yes Yes   Sig: Take 70 mg by mouth every 7 days.   busPIRone (BUSPAR) 10 MG tablet 4/1/2025 Bedtime  Yes Yes   Sig: Take 10 mg by mouth 2 times daily.   cholecalciferol (VITAMIN D3) 25 mcg (1000 units) capsule 4/1/2025 Morning  Yes Yes   Sig: Take 1 capsule by mouth daily.   estradiol (ESTRACE) 0.1 MG/GM vaginal cream Past Week  Yes Yes   Sig: Place vaginally three times a week. MWF at bedtime (self-administered by patient)   fexofenadine (ALLEGRA) 180 MG tablet   Yes Yes   Sig: Take 180 mg by mouth daily as needed for allergies.   fluticasone (FLONASE) 50 MCG/ACT nasal spray   Yes Yes   Sig: Spray 2 sprays into both nostrils daily as needed for allergies.   furosemide (LASIX) 20 MG tablet 4/1/2025 Morning  No Yes   Sig: Take 3 tablets (60 mg) by mouth daily.   gabapentin (NEURONTIN) 100 MG capsule 4/1/2025 Bedtime  Yes Yes   Sig: Take 100 mg by mouth 2 times daily.   ketoconazole (NIZORAL) 2 % external  cream   Yes Yes   Sig: Apply topically 2 times daily as needed for itching.   loperamide (IMODIUM) 2 MG capsule   Yes Yes   Sig: Take 2 mg by mouth 3 times daily as needed for diarrhea   loperamide (IMODIUM) 2 MG capsule 4/1/2025 Morning  Yes Yes   Sig: Take 2 mg by mouth every morning.   mirtazapine (REMERON) 7.5 MG tablet 4/1/2025 Bedtime  Yes Yes   Sig: Take 7.5 mg by mouth at bedtime.   nystatin (MYCOSTATIN) 240383 UNIT/GM external powder   Yes Yes   Sig: Apply topically 2 times daily as needed for dry skin.   polyethylene glycol-propylene glycol (SYSTANE ULTRA) 0.4-0.3 % SOLN ophthalmic solution 4/1/2025 Morning  Yes Yes   Sig: Place 2 drops into both eyes 2 times daily.   potassium chloride ER (K-TAB/KLOR-CON) 10 MEQ CR tablet 4/1/2025 Bedtime  Yes Yes   Sig: Take 10 mEq by mouth 2 times daily.   sertraline (ZOLOFT) 100 MG tablet 4/1/2025 Morning  Yes Yes   Sig: Take 150 mg by mouth daily.   zinc oxide (DESITIN) 20 % external ointment   Yes Yes   Sig: Apply topically 2 times daily as needed for dry skin or irritation (coccyx).      Facility-Administered Medications: None        Review of Systems    ROS negative except as noted in HPI above       Physical Exam   Vital Signs: Temp: 97.7  F (36.5  C) Temp src: Oral BP: 116/56 Pulse: 92   Resp: 27 SpO2: (!) 90 % O2 Device: Nasal cannula Oxygen Delivery: 5 LPM  Weight: 129 lbs 0 oz    Constitutional: awake, alert, cooperative, lying in bed in no apparent distress  Eyes: Pupils equal, round and reactive to light, extra ocular muscles intact, sclera clear, conjunctiva normal  ENT: normocephalic, without obvious abnormality, atraumatic  Neck: No cervical or supraclavicular lymphadenopathy  Respiratory: Fine crackles diffusely in bilateral lung fields. Fair air movement throughout. No increased work of breathing, no wheezing  Cardiovascular: Irregularly irregular. 2/6 systolic murmur loudest RUSB/LUSB. Normal S1 and S2, no S3 or S4  GI: Normal bowel sounds, soft,  non-distended, no masses palpated, no hepatosplenomegaly  Skin: varicose veins present in BLE with scattered ecchymoses. No other rashes or lesions visualized on exposed skin. Trace to 1+ edema in BLE  Musculoskeletal: There is no redness, warmth, or swelling of the joints. Moves all four extremities spontaneously.  Neurologic: Cranial nerves II-XII are grossly intact.  Neuropsychiatric: General: normal, calm, and normal eye contact  Level of consciousness: alert / normal  Affect: normal and pleasant    Medical Decision Making       Please see A&P for additional details of medical decision making.      Data     I have personally reviewed the following data over the past 24 hrs:    7.9  \   11.6 (L)   / 319     143 98 17.7 /  116 (H)   4.4 35 (H) 0.89 \     Trop: 23 (H) BNP: 2,555 (H)     Procal: 0.17 CRP: N/A Lactic Acid: N/A         Imaging results reviewed over the past 24 hrs:   Recent Results (from the past 24 hours)   Chest CT w/o contrast    Narrative    EXAM: CT CHEST W/O CONTRAST  LOCATION: [facility]  DATE: 4/2/2025    INDICATION: Worsening hypoxia and dyspnea  COMPARISON: Chest x-ray 3/26/2025, CT chest 20 2022 and older studies  TECHNIQUE: CT chest without IV contrast. Multiplanar reformats were obtained. Dose reduction techniques were used.  CONTRAST: None.    FINDINGS:   LUNGS AND PLEURA: Extensive, heterogeneous groundglass opacities are noted bilaterally, left greater than right with areas of segmental sparing posteriorly in the right upper and middle lobes and anteriorly in the right lower lobe. No intralobular septal   thickening. Changes are superimposed over chronic areas of subpleural reticulation, traction bronchiectasis and a few subpleural cysts in the left upper lobe anteriorly. There is a very small left pleural effusion.    MEDIASTINUM/AXILLAE: Less than 1 cm right thyroid nodule is unchanged. Main pulmonary artery is enlarged at 3.8 cm. Extensive calcification  aorta and branches. No aneurysm. Extensive mitral annular calcifications.    CORONARY ARTERY CALCIFICATION: Mild.    UPPER ABDOMEN: Quite distended gallbladder with large gallstone in the neck and hyperdense bile and a few layering smaller stones dependently. Appearance is unchanged and there are no inflammatory changes about the gallbladder. Scattered colonic   diverticulosis. Left renal cyst again noted. Aorta and branches are heavily calcified.    MUSCULOSKELETAL: Patient's developed new vertebral body compression fractures since 2022. There is a new vertebral plana at L1, compression of the superior endplate of L3 with sclerosis and 50% loss of vertebral body height. Compression fractures at T12   and T10 are unchanged.      Impression    IMPRESSION:   1.  Patient has developed heterogeneous, diffuse groundglass opacities without intralobular septal thickening, left greater than right. Changes are superimposed over underlying pulmonary fibrosis, non-UIP pattern as noted above. Pattern suggests acute   pneumonitis. Consider pulmonary consultation.  2.  Trace left effusion without  intralobular septal thickening to suggest failure.  3.  Enlarged main pulmonary artery consistent with pulmonary arterial hypertension.  4.  Compression fractures at L1 and L3, new since 2022.  5.  Cholelithiasis with very large gallstone in the gallbladder neck and no signs of acute inflammation, unchanged.  6.  Other noncritical findings as noted above.

## 2025-04-02 NOTE — ED PROVIDER NOTES
EMERGENCY DEPARTMENT ENCOUNTER      NAME: Maranda Downing  AGE: 89 year old female  YOB: 1935  MRN: [id number]  EVALUATION DATE & TIME: 2025 10:12 AM    PCP: Joy López    ED PROVIDER: Shar Hirsch M.D.      Chief Complaint   Patient presents with    Shortness of Breath         FINAL IMPRESSION:  1. Hypoxia    2. ILD (interstitial lung disease) (H)        ED COURSE & MEDICAL DECISION MAKIN year old female presents to the Emergency Department for evaluation of shortness of breath and hypoxia.  Patient recently hospitalized for encephalopathy and UTI symptoms with borderline sepsis as well as noted some hypoxia during that admission and left the hospital on supplemental oxygen.  This was not her baseline prior to hospitalization.  She presents with worsening hypoxia since discharge as well as dyspnea yesterday.  She is not in respiratory distress but is requiring 4 to 5 L of supplemental oxygen via nasal cannula to maintain oxygen saturations in the low 90s.  Lung sounds are coarse bilaterally.  Labs and imaging were obtained as below.  Patient's labs look reassuring against systemic infection with normal white blood cell count and procalcitonin level.  BNP is mildly elevated.  She did get diuresed somewhat while she was in the hospital.  Chest CT shows chronic ILD changes as well as some superimposed diffuse groundglass opacities.  Suspicion from radiology was for superimposed acute pneumonitis.  Case discussed with , pulmonology.  Since she does not seem septic or in any new distress, requested that we hold off on interventions like antibiotics or steroids while she reviews the chart and provide some additional recommendations.  We discussed a plan for admission for acute  830 yesterday worsening hypoxia.  Patient and family were in agreement with this.  Discussed case with resident service.    At the conclusion of the encounter I discussed the results of all of the  "tests and the disposition. The questions were answered. The patient or family acknowledged understanding and was agreeable with the care plan.       Medical Decision Making  I obtained history from Family Member/Significant Other  I reviewed the EMR: Inpatient Record: Hospitalization from earlier this month  Care impacted by Chronic Lung Disease  I discussed the care with another health care provider: Admitting Resident  Admit.    MIPS (CTPE, Dental pain, Driver, Sinusitis, Asthma/COPD, Head Trauma): Not Applicable    SEPSIS: None            MEDICATIONS GIVEN IN THE EMERGENCY:  Medications - No data to display    NEW PRESCRIPTIONS STARTED AT TODAY'S ER VISIT  New Prescriptions    No medications on file          =================================================================    HPI    Patient information was obtained from: Nursing report, patient    Use of : N/A         Maranda Downing is a 89 year old female with a pertinent history of recurrent UTI, HFpEF, RBBB, CAD, DM2, Fatty Liver, HTN, HLD, IBS, GERD, Thyroid Nodule who presents to this ED via ems from assisted living for evaluation of shortness of breath.    Per chart review, 03/24/2025 - 03/26/2025 (discharged 7 days ago) Alzada's admission for evaluation of shortness of breath, hypoxia, and dysuria. She was admitted for sepsis 2/2 UTI. 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 Discharged to assisted living with home oxygen with PCP/[facility] provider follow up within 7 days.    Per nursing report:  Since being discharged 7 days ago on 03/26/2025, she has newly been on 3L of at home oxygen at her care facility. Yesterday 04/01/2025, she endorsed shortness of breath \"the whole day\" that was not checked on by her facility. She was found earlier today 04/02/2025 to be in the mid 80% on 3L by staff with shortness of breath. EMS arrived and increased oxygen to " 4L with sat improvement. When she checked in here in the ED, she was placed on 5L with improvement to 94%.     Per patient:  She has endorsed shortness of breath since yesterday 04/01/2025. She denies chest pain, fever, or any other symptoms at this time. She is not on any current antibiotics.    REVIEW OF SYSTEMS   All systems reviewed and negative except as noted in HPI.    PAST MEDICAL HISTORY:  No past medical history on file.    PAST SURGICAL HISTORY:  No past surgical history on file.        CURRENT MEDICATIONS:    No current facility-administered medications for this encounter.     Current Outpatient Medications   Medication Sig Dispense Refill    acetaminophen (TYLENOL) 500 MG tablet Take 1,000 mg by mouth 2 times daily.      acetaminophen (TYLENOL) 500 MG tablet Take 1,000 mg by mouth 3 times daily as needed for mild pain.      alendronate (FOSAMAX) 70 MG tablet Take 70 mg by mouth every 7 days.      busPIRone (BUSPAR) 10 MG tablet Take 10 mg by mouth 2 times daily.      cholecalciferol (VITAMIN D3) 25 mcg (1000 units) capsule Take 1 capsule by mouth daily.      estradiol (ESTRACE) 0.1 MG/GM vaginal cream Place vaginally three times a week. MWF at bedtime (self-administered by patient)      fexofenadine (ALLEGRA) 180 MG tablet Take 180 mg by mouth daily as needed for allergies.      fluticasone (FLONASE) 50 MCG/ACT nasal spray Spray 2 sprays into both nostrils daily as needed for allergies.      furosemide (LASIX) 20 MG tablet Take 3 tablets (60 mg) by mouth daily.      gabapentin (NEURONTIN) 100 MG capsule Take 100 mg by mouth 2 times daily.      ketoconazole (NIZORAL) 2 % external cream Apply topically 2 times daily as needed for itching.      loperamide (IMODIUM) 2 MG capsule Take 2 mg by mouth every morning.      loperamide (IMODIUM) 2 MG capsule Take 2 mg by mouth 3 times daily as needed for diarrhea      mirtazapine (REMERON) 7.5 MG tablet Take 7.5 mg by mouth at bedtime.      nystatin (MYCOSTATIN)  782578 UNIT/GM external powder Apply topically 2 times daily as needed for dry skin.      polyethylene glycol-propylene glycol (SYSTANE ULTRA) 0.4-0.3 % SOLN ophthalmic solution Place 2 drops into both eyes 2 times daily.      potassium chloride ER (K-TAB/KLOR-CON) 10 MEQ CR tablet Take 10 mEq by mouth 2 times daily.      sertraline (ZOLOFT) 100 MG tablet Take 150 mg by mouth daily.      zinc oxide (DESITIN) 20 % external ointment Apply topically 2 times daily as needed for dry skin or irritation (coccyx).           ALLERGIES:  Allergies   Allergen Reactions    Codeine Nausea    Hydrocodone      Other reaction(s): GI Upset  nausea    Iodinated Contrast Media     Levofloxacin      Other reaction(s): Intolerance-Can't Take  Made leg pain worse.    Lisinopril Cough    Morphine Nausea and Vomiting     Other reaction(s): Nausea/Vomiting    Sulfa Antibiotics Nausea    Penicillins Rash       FAMILY HISTORY:  No family history on file.    SOCIAL HISTORY:   Social History     Socioeconomic History    Marital status:    Tobacco Use    Smoking status: Unknown    Smokeless tobacco: Never     Social Drivers of Health     Financial Resource Strain: Low Risk  (3/24/2025)    Financial Resource Strain     Within the past 12 months, have you or your family members you live with been unable to get utilities (heat, electricity) when it was really needed?: No   Food Insecurity: Low Risk  (3/24/2025)    Food Insecurity     Within the past 12 months, did you worry that your food would run out before you got money to buy more?: No     Within the past 12 months, did the food you bought just not last and you didn t have money to get more?: No   Transportation Needs: Low Risk  (3/24/2025)    Transportation Needs     Within the past 12 months, has lack of transportation kept you from medical appointments, getting your medicines, non-medical meetings or appointments, work, or from getting things that you need?: No    [facility]    Social Connections   Interpersonal Safety: Low Risk  (3/24/2025)    Interpersonal Safety     Do you feel physically and emotionally safe where you currently live?: Yes     Within the past 12 months, have you been hit, slapped, kicked or otherwise physically hurt by someone?: No     Within the past 12 months, have you been humiliated or emotionally abused in other ways by your partner or ex-partner?: No   Housing Stability: Low Risk  (3/24/2025)    Housing Stability     Do you have housing? : Yes     Are you worried about losing your housing?: No       VITALS:  /65   Pulse 91   Temp 97.7  F (36.5  C) (Oral)   Resp 15   Wt 58.5 kg (129 lb)   SpO2 94%   BMI 23.59 kg/m      PHYSICAL EXAM    Constitutional: Thin elderly female patient, laying in bed, no acute distress  HENT: Normocephalic, Atraumatic. Neck Supple.  Eyes: EOMI, Conjunctiva normal.  Respiratory: No severe respiratory distress.  Breathing is unlabored on  5 L supplemental oxygen via nasal cannula.  Lung sounds are coarse bilaterally.  Cardiovascular: Normal heart rate, Regular rhythm. Trace peripheral edema.  Abdomen: Soft, nontender  Musculoskeletal: Good range of motion in all major joints. No major deformities noted.  Integument: Warm, Dry.  Neurologic: Alert & awake, Normal motor function, Normal sensory function, No focal deficits noted.   Psychiatric: Cooperative. Affect appropriate.     LAB:  All pertinent labs reviewed and interpreted.  Labs Ordered and Resulted from Time of ED Arrival to Time of ED Departure   BASIC METABOLIC PANEL - Abnormal       Result Value    Sodium 143      Potassium 4.4      Chloride 98      Carbon Dioxide (CO2) 35 (*)     Anion Gap 10      Urea Nitrogen 17.7      Creatinine 0.89      GFR Estimate 61      Calcium 9.2      Glucose 116 (*)    BLOOD GAS VENOUS - Abnormal    pH Venous 7.43      pCO2 Venous 54 (*)     pO2 Venous 22 (*)     Bicarbonate Venous 36 (*)     Base  Excess/Deficit Venous 9.5 (*)     FIO2 40      Oxyhemoglobin Venous 34 (*)     O2 Sat, Venous 34.5 (*)    NT PROBNP INPATIENT - Abnormal    N terminal Pro BNP Inpatient 2,555 (*)    TROPONIN T, HIGH SENSITIVITY - Abnormal    Troponin T, High Sensitivity 27 (*)    CBC WITH PLATELETS AND DIFFERENTIAL - Abnormal    WBC Count 7.9      RBC Count 3.80      Hemoglobin 11.6 (*)     Hematocrit 35.9      MCV 95      MCH 30.5      MCHC 32.3      RDW 12.6      Platelet Count 319      % Neutrophils 89      % Lymphocytes 6      % Monocytes 4      % Eosinophils 1      % Basophils 0      % Immature Granulocytes 1      NRBCs per 100 WBC 0      Absolute Neutrophils 7.0      Absolute Lymphocytes 0.5 (*)     Absolute Monocytes 0.3      Absolute Eosinophils 0.1      Absolute Basophils 0.0      Absolute Immature Granulocytes 0.1      Absolute NRBCs 0.0     TROPONIN T, HIGH SENSITIVITY - Abnormal    Troponin T, High Sensitivity 23 (*)    INFLUENZA A/B, RSV AND SARS-COV2 PCR - Normal    Influenza A PCR Negative      Influenza B PCR Negative      RSV PCR Negative      SARS CoV2 PCR Negative     PROCALCITONIN - Normal    Procalcitonin 0.17         RADIOLOGY:  Reviewed all pertinent imaging. Please see official radiology report.  Chest CT w/o contrast   Final Result   IMPRESSION:    1.  Patient has developed heterogeneous, diffuse groundglass opacities without intralobular septal thickening, left greater than right. Changes are superimposed over underlying pulmonary fibrosis, non-UIP pattern as noted above. Pattern suggests acute    pneumonitis. Consider pulmonary consultation.   2.  Trace left effusion without  intralobular septal thickening to suggest failure.   3.  Enlarged main pulmonary artery consistent with pulmonary arterial hypertension.   4.  Compression fractures at L1 and L3, new since 2022.   5.  Cholelithiasis with very large gallstone in the gallbladder neck and no signs of acute inflammation, unchanged.   6.  Other noncritical  findings as noted above.             EKG:    Performed at: 1028    Impression: Sinus rhythm with PACs, left axis deviation, incomplete right bundle branch block,    Rate: 93  Rhythm: Sinus  Axis: Leftward  MI Interval: 100  QRS Interval: 112  QTc Interval: 519  ST Changes: None significant  Comparison: Compared to March 24, 2025, sinus rhythm has replaced atrial fibrillation    I have independently reviewed and interpreted the EKG(s) documented above.        I, Hong Barclay, am serving as a scribe to document services personally performed by Dr. Shar Hirsch, based on my observation and the provider's statements to me. I, Shar Hirsch MD attest that Hong Barclay is acting in a scribe capacity, has observed my performance of the services and has documented them in accordance with my direction.    Shar Hirsch M.D.  Emergency Medicine  [facility] EMERGENCY DEPARTMENT  [facility] [facility] [address]  [phone]  Dept: [phone]      Shar Hirsch MD  04/02/25 3754

## 2025-04-03 ENCOUNTER — APPOINTMENT (OUTPATIENT)
Dept: SPEECH THERAPY | Facility: HOSPITAL | Age: OVER 89
DRG: 196 | End: 2025-04-03
Attending: STUDENT IN AN ORGANIZED HEALTH CARE EDUCATION/TRAINING PROGRAM
Payer: MEDICARE

## 2025-04-03 ENCOUNTER — DOCUMENTATION ONLY (OUTPATIENT)
Dept: OTHER | Facility: CLINIC | Age: OVER 89
End: 2025-04-03
Payer: MEDICARE

## 2025-04-03 ENCOUNTER — APPOINTMENT (OUTPATIENT)
Dept: RADIOLOGY | Facility: HOSPITAL | Age: OVER 89
DRG: 196 | End: 2025-04-03
Payer: MEDICARE

## 2025-04-03 ENCOUNTER — APPOINTMENT (OUTPATIENT)
Dept: OCCUPATIONAL THERAPY | Facility: HOSPITAL | Age: OVER 89
DRG: 196 | End: 2025-04-03
Attending: STUDENT IN AN ORGANIZED HEALTH CARE EDUCATION/TRAINING PROGRAM
Payer: MEDICARE

## 2025-04-03 ENCOUNTER — APPOINTMENT (OUTPATIENT)
Dept: CARDIOLOGY | Facility: HOSPITAL | Age: OVER 89
DRG: 196 | End: 2025-04-03
Attending: STUDENT IN AN ORGANIZED HEALTH CARE EDUCATION/TRAINING PROGRAM
Payer: MEDICARE

## 2025-04-03 ENCOUNTER — APPOINTMENT (OUTPATIENT)
Dept: PHYSICAL THERAPY | Facility: HOSPITAL | Age: OVER 89
DRG: 196 | End: 2025-04-03
Attending: STUDENT IN AN ORGANIZED HEALTH CARE EDUCATION/TRAINING PROGRAM
Payer: MEDICARE

## 2025-04-03 ENCOUNTER — APPOINTMENT (OUTPATIENT)
Dept: SPEECH THERAPY | Facility: HOSPITAL | Age: OVER 89
DRG: 196 | End: 2025-04-03
Payer: MEDICARE

## 2025-04-03 LAB
ANION GAP SERPL CALCULATED.3IONS-SCNC: 12 MMOL/L (ref 7–15)
ATRIAL RATE - MUSE: 94 BPM
BUN SERPL-MCNC: 24.1 MG/DL (ref 8–23)
C PNEUM DNA SPEC QL NAA+PROBE: NOT DETECTED
CALCIUM SERPL-MCNC: 9 MG/DL (ref 8.8–10.4)
CHLORIDE SERPL-SCNC: 98 MMOL/L (ref 98–107)
CREAT SERPL-MCNC: 0.95 MG/DL (ref 0.51–0.95)
DIASTOLIC BLOOD PRESSURE - MUSE: 60 MMHG
EGFRCR SERPLBLD CKD-EPI 2021: 57 ML/MIN/1.73M2
ERYTHROCYTE [DISTWIDTH] IN BLOOD BY AUTOMATED COUNT: 12.2 % (ref 10–15)
FLUAV H1 2009 PAND RNA SPEC QL NAA+PROBE: NOT DETECTED
FLUAV H1 RNA SPEC QL NAA+PROBE: NOT DETECTED
FLUAV H3 RNA SPEC QL NAA+PROBE: NOT DETECTED
FLUAV RNA SPEC QL NAA+PROBE: NOT DETECTED
FLUBV RNA SPEC QL NAA+PROBE: NOT DETECTED
GLUCOSE BLDC GLUCOMTR-MCNC: 129 MG/DL (ref 70–99)
GLUCOSE SERPL-MCNC: 114 MG/DL (ref 70–99)
HADV DNA SPEC QL NAA+PROBE: NOT DETECTED
HCO3 SERPL-SCNC: 31 MMOL/L (ref 22–29)
HCOV PNL SPEC NAA+PROBE: NOT DETECTED
HCT VFR BLD AUTO: 34.8 % (ref 35–47)
HGB BLD-MCNC: 11 G/DL (ref 11.7–15.7)
HMPV RNA SPEC QL NAA+PROBE: NOT DETECTED
HPIV1 RNA SPEC QL NAA+PROBE: NOT DETECTED
HPIV2 RNA SPEC QL NAA+PROBE: NOT DETECTED
HPIV3 RNA SPEC QL NAA+PROBE: NOT DETECTED
HPIV4 RNA SPEC QL NAA+PROBE: NOT DETECTED
INTERPRETATION ECG - MUSE: NORMAL
LVEF ECHO: NORMAL
M PNEUMO DNA SPEC QL NAA+PROBE: NOT DETECTED
MCH RBC QN AUTO: 30.1 PG (ref 26.5–33)
MCHC RBC AUTO-ENTMCNC: 31.6 G/DL (ref 31.5–36.5)
MCV RBC AUTO: 95 FL (ref 78–100)
P AXIS - MUSE: 21 DEGREES
PLATELET # BLD AUTO: 316 10E3/UL (ref 150–450)
POTASSIUM SERPL-SCNC: 4.3 MMOL/L (ref 3.4–5.3)
PR INTERVAL - MUSE: NORMAL MS
QRS DURATION - MUSE: 112 MS
QT - MUSE: 418 MS
QTC - MUSE: 519 MS
R AXIS - MUSE: -31 DEGREES
RBC # BLD AUTO: 3.65 10E6/UL (ref 3.8–5.2)
RSV RNA SPEC QL NAA+PROBE: NOT DETECTED
RSV RNA SPEC QL NAA+PROBE: NOT DETECTED
RV+EV RNA SPEC QL NAA+PROBE: NOT DETECTED
SODIUM SERPL-SCNC: 141 MMOL/L (ref 135–145)
SYSTOLIC BLOOD PRESSURE - MUSE: 121 MMHG
T AXIS - MUSE: 106 DEGREES
VENTRICULAR RATE- MUSE: 93 BPM
WBC # BLD AUTO: 5.9 10E3/UL (ref 4–11)

## 2025-04-03 PROCEDURE — 80048 BASIC METABOLIC PNL TOTAL CA: CPT

## 2025-04-03 PROCEDURE — 97535 SELF CARE MNGMENT TRAINING: CPT | Mod: GO

## 2025-04-03 PROCEDURE — 250N000011 HC RX IP 250 OP 636: Performed by: INTERNAL MEDICINE

## 2025-04-03 PROCEDURE — 92610 EVALUATE SWALLOWING FUNCTION: CPT | Mod: GN

## 2025-04-03 PROCEDURE — 250N000013 HC RX MED GY IP 250 OP 250 PS 637

## 2025-04-03 PROCEDURE — 82435 ASSAY OF BLOOD CHLORIDE: CPT

## 2025-04-03 PROCEDURE — 97162 PT EVAL MOD COMPLEX 30 MIN: CPT | Mod: GP

## 2025-04-03 PROCEDURE — 250N000011 HC RX IP 250 OP 636: Performed by: STUDENT IN AN ORGANIZED HEALTH CARE EDUCATION/TRAINING PROGRAM

## 2025-04-03 PROCEDURE — 97110 THERAPEUTIC EXERCISES: CPT | Mod: GP

## 2025-04-03 PROCEDURE — 255N000002 HC RX 255 OP 636: Performed by: STUDENT IN AN ORGANIZED HEALTH CARE EDUCATION/TRAINING PROGRAM

## 2025-04-03 PROCEDURE — 97166 OT EVAL MOD COMPLEX 45 MIN: CPT | Mod: GO

## 2025-04-03 PROCEDURE — 82962 GLUCOSE BLOOD TEST: CPT

## 2025-04-03 PROCEDURE — 74230 X-RAY XM SWLNG FUNCJ C+: CPT

## 2025-04-03 PROCEDURE — 99232 SBSQ HOSP IP/OBS MODERATE 35: CPT | Performed by: INTERNAL MEDICINE

## 2025-04-03 PROCEDURE — 120N000001 HC R&B MED SURG/OB

## 2025-04-03 PROCEDURE — 93306 TTE W/DOPPLER COMPLETE: CPT | Mod: 26 | Performed by: INTERNAL MEDICINE

## 2025-04-03 PROCEDURE — 97530 THERAPEUTIC ACTIVITIES: CPT | Mod: GP

## 2025-04-03 PROCEDURE — C8929 TTE W OR WO FOL WCON,DOPPLER: HCPCS

## 2025-04-03 PROCEDURE — 36415 COLL VENOUS BLD VENIPUNCTURE: CPT

## 2025-04-03 PROCEDURE — 92611 MOTION FLUOROSCOPY/SWALLOW: CPT | Mod: GN

## 2025-04-03 PROCEDURE — G0463 HOSPITAL OUTPT CLINIC VISIT: HCPCS

## 2025-04-03 PROCEDURE — 250N000011 HC RX IP 250 OP 636

## 2025-04-03 PROCEDURE — 99232 SBSQ HOSP IP/OBS MODERATE 35: CPT | Mod: GC

## 2025-04-03 PROCEDURE — 85027 COMPLETE CBC AUTOMATED: CPT

## 2025-04-03 RX ORDER — PIPERACILLIN SODIUM, TAZOBACTAM SODIUM 3; .375 G/15ML; G/15ML
3.38 INJECTION, POWDER, LYOPHILIZED, FOR SOLUTION INTRAVENOUS ONCE
Status: COMPLETED | OUTPATIENT
Start: 2025-04-03 | End: 2025-04-03

## 2025-04-03 RX ORDER — BARIUM SULFATE 400 MG/ML
SUSPENSION ORAL ONCE
Status: COMPLETED | OUTPATIENT
Start: 2025-04-03 | End: 2025-04-03

## 2025-04-03 RX ORDER — PIPERACILLIN SODIUM, TAZOBACTAM SODIUM 3; .375 G/15ML; G/15ML
3.38 INJECTION, POWDER, LYOPHILIZED, FOR SOLUTION INTRAVENOUS EVERY 8 HOURS
Status: DISPENSED | OUTPATIENT
Start: 2025-04-03

## 2025-04-03 RX ADMIN — POLYPROPYLENE GLYCOL 400, PROPYLENE GLYCOL 2 DROP: .4; .3 LIQUID OPHTHALMIC at 09:12

## 2025-04-03 RX ADMIN — MIRTAZAPINE 7.5 MG: 7.5 TABLET, FILM COATED ORAL at 21:01

## 2025-04-03 RX ADMIN — FUROSEMIDE 60 MG: 20 TABLET ORAL at 10:13

## 2025-04-03 RX ADMIN — GABAPENTIN 100 MG: 100 CAPSULE ORAL at 10:14

## 2025-04-03 RX ADMIN — ENOXAPARIN SODIUM 40 MG: 40 INJECTION SUBCUTANEOUS at 16:48

## 2025-04-03 RX ADMIN — GABAPENTIN 100 MG: 100 CAPSULE ORAL at 21:01

## 2025-04-03 RX ADMIN — POTASSIUM CHLORIDE 10 MEQ: 750 TABLET, EXTENDED RELEASE ORAL at 10:14

## 2025-04-03 RX ADMIN — BUSPIRONE HYDROCHLORIDE 10 MG: 10 TABLET ORAL at 21:01

## 2025-04-03 RX ADMIN — SERTRALINE HYDROCHLORIDE 150 MG: 100 TABLET ORAL at 10:17

## 2025-04-03 RX ADMIN — POTASSIUM CHLORIDE 10 MEQ: 750 TABLET, EXTENDED RELEASE ORAL at 21:01

## 2025-04-03 RX ADMIN — PIPERACILLIN AND TAZOBACTAM 3.38 G: 3; .375 INJECTION, POWDER, FOR SOLUTION INTRAVENOUS at 16:48

## 2025-04-03 RX ADMIN — PIPERACILLIN AND TAZOBACTAM 3.38 G: 3; .375 INJECTION, POWDER, FOR SOLUTION INTRAVENOUS at 10:11

## 2025-04-03 RX ADMIN — METHYLPREDNISOLONE SODIUM SUCCINATE 40 MG: 40 INJECTION INTRAMUSCULAR; INTRAVENOUS at 03:32

## 2025-04-03 RX ADMIN — BUSPIRONE HYDROCHLORIDE 10 MG: 10 TABLET ORAL at 10:17

## 2025-04-03 RX ADMIN — PERFLUTREN 2 ML: 6.52 INJECTION, SUSPENSION INTRAVENOUS at 08:24

## 2025-04-03 RX ADMIN — BARIUM SULFATE 60 ML: 400 SUSPENSION ORAL at 15:24

## 2025-04-03 RX ADMIN — METHYLPREDNISOLONE SODIUM SUCCINATE 40 MG: 40 INJECTION INTRAMUSCULAR; INTRAVENOUS at 16:47

## 2025-04-03 RX ADMIN — POLYPROPYLENE GLYCOL 400, PROPYLENE GLYCOL 2 DROP: .4; .3 LIQUID OPHTHALMIC at 21:02

## 2025-04-03 ASSESSMENT — ACTIVITIES OF DAILY LIVING (ADL)
ADLS_ACUITY_SCORE: 63
ADLS_ACUITY_SCORE: 71
ADLS_ACUITY_SCORE: 63
ADLS_ACUITY_SCORE: 82
ADLS_ACUITY_SCORE: 86
ADLS_ACUITY_SCORE: 86
ADLS_ACUITY_SCORE: 71
ADLS_ACUITY_SCORE: 82
ADLS_ACUITY_SCORE: 71
ADLS_ACUITY_SCORE: 63
ADLS_ACUITY_SCORE: 71
ADLS_ACUITY_SCORE: 86
ADLS_ACUITY_SCORE: 71
ADLS_ACUITY_SCORE: 82

## 2025-04-03 NOTE — CONSULTS
[facility]  WO Nurse Inpatient Assessment     Consulted for: sacrum    WO nurse follow-up plan: weekly    Patient History (according to provider note(s):      Per H+P:  89 year old female admitted on 4/2/2025. She has a history of recurrent UTI, HFpEF, RBBB, CAD, DM2, Fatty Liver, HTN, HLD, IBS, GERD, Thyroid Nodule who is admitted for worsening shortness of breath and hypoxia     Patient was recently admitted 3/24 to 3/26 for urosepsis, she also was hypoxic at that time with imaging questioning chronic ILD that was exacerbated by her acute illness.  She was unable to come off the oxygen during hospital stay and was discharged on 2 L nasal cannula back to her assisted living facility.     She had a couple of good days when she got back there, however she has been worsening more so in the last 4 days.  Her shortness of breath has gotten worse, along with a worsening dry cough as well. No orthopnea. Her oxygen needs have also increased during last couple days.  No fevers or chills with any of this, no other runny nose or sick symptoms.  No chest pain, no significant abdominal pain.  Does sometimes have discomfort before and after eating.    Assessment:      Areas visualized during today's visit: Sacrum/coccyx    Wound location: Coccyx  Photo did not save  Wound due to:  pressure injury/moisture  Wound history/plan of care: Patient state she has had a long time  Wound base: dermis/scab     Palpation of the wound bed: normal      Drainage: none     Description of drainage: none     Measurements (length x width x depth, in cm): 2 x 2cm     Tunneling: N/A     Undermining: N/A  Periwound skin: Intact      Color: normal and consistent with surrounding tissue      Temperature: normal   Odor: none  Pain: mild, tender  Pain interventions prior to dressing change: slow and gentle cares   Treatment goal: Heal   STATUS: initial assessment  Supplies ordered: supplies stored on unit       Treatment Plan:      Coccyx:  1. Soak wound and daly skin with Vashe moistened gauze for 5 minutes, dry gently  2. Cover with Mepilex  Change every other day        Orders: Written    RECOMMEND PRIMARY TEAM ORDER: None, at this time  Education provided: plan of care  Discussed plan of care with: Patient and Nurse  Notify WOC if wound(s) deteriorate.  Nursing to notify the Provider(s) and re-consult the WOC Nurse if new skin concern.    DATA:     Current support surface: Standard  ED cart  Containment of urine/stool: Incontinent pad in bed  BMI: Body mass index is 22.65 kg/m .   Active diet order: Orders Placed This Encounter      Combination Diet Full Liquid     Output: No intake/output data recorded.     Labs:   Recent Labs   Lab 04/03/25  0701   HGB 11.0*   WBC 5.9     Pressure injury risk assessment:   Sensory Perception: 3-->slightly limited  Moisture: 3-->occasionally moist  Activity: 2-->chairfast  Mobility: 2-->very limited  Nutrition: 2-->probably inadequate  Friction and Shear: 2-->potential problem  Ambrosio Score: 14    Rama Rajan, MADIN, RN, PHN, HNB-BC, CWOCN  Pager no longer is use, please contact through [employer] group: [facility] [employer] [facility]

## 2025-04-03 NOTE — DISCHARGE INSTRUCTIONS
WOC DISCHARGE INSTRUCTIONS:   Coccyx:  1. Soak wound and daly skin with Vashe moistened gauze for 5 minutes, dry gently  2. Cover with Mepilex  Change every other day

## 2025-04-03 NOTE — PROGRESS NOTES
Writer received report for this pt at 2130. Pt came to the new room at 2145 with family at bedside. Pt was alert and oriented times 2. She is confused and forgetful. Vitals mostly stable. She is on 5L oxymask. Pt has order for NPO with no exception. Paged house doctor to clarify if writer can give medication with sips and apple sauce. MD said to hold medications for tonight. Pt has order for speech consult. Yuanck in place.

## 2025-04-03 NOTE — PROGRESS NOTES
"   04/03/25 1300   Appointment Info   Signing Clinician's Name / Credentials (PT) Juana Saini, SHANE   Living Environment   People in Home facility resident   Current Living Arrangements assisted living   Home Accessibility wheelchair accessible   Transportation Anticipated family or friend will provide   Living Environment Comments Pt said she does get meals delivered to her apt.   Self-Care   Usual Activity Tolerance good   Current Activity Tolerance moderate   Equipment Currently Used at Home wheelchair, manual;walker, rolling  (FWW and 4WW.  Pt said she uses the WC to get around)   Fall history within last six months no   Activity/Exercise/Self-Care Comment Pt is Indep with WC mobility, and pivot transfers and does not use the walker for transfers.  Pt is assisted with meds, and some ADLs   General Information   Onset of Illness/Injury or Date of Surgery 04/02/25   Referring Physician Donald Calderon MD   Patient/Family Therapy Goals Statement (PT) none stated   Pertinent History of Current Problem (include personal factors and/or comorbidities that impact the POC) Per chart review, pt \"is a 89 year old female admitted on 4/2/2025. She has a history of recurrent UTI, HFpEF, RBBB, CAD, DM2, Fatty Liver, HTN, HLD, IBS, GERD, Thyroid Nodule who is admitted for worsening shortness of breath and hypoxia likely secondary to chronic lung disease.\"   Existing Precautions/Restrictions fall   Weight-Bearing Status - LLE weight-bearing as tolerated   Weight-Bearing Status - RLE weight-bearing as tolerated   Cognition   Orientation Status (Cognition) oriented to;person;place;situation   Pain Assessment   Patient Currently in Pain   (Pt does have gluteal area pain that is bad.  Nursing and MD aware of this.  Pt transfered to the chair and her pain was alittle better.)   Range of Motion (ROM)   Range of Motion ROM is WNL   ROM Comment bilat LEs with some discomfort at her gluteal area   Strength (Manual Muscle Testing)   Strength " (Manual Muscle Testing) Deficits observed during functional mobility   Strength Comments MMT grossly 4/5   Bed Mobility   Comment, (Bed Mobility) Supine>sit with CGA   Transfers   Comment, (Transfers) Min A x1 with pivot   Gait/Stairs (Locomotion)   [place] Level (Gait) not tested   Comment, (Gait/Stairs) Pt uses a WC for mobility   Balance   Balance Comments CGA with dyn bal.   Sensory Examination   Sensory Perception Comments Pt could feel light touch bilat LEs   Clinical Impression   Criteria for Skilled Therapeutic Intervention Yes, treatment indicated   PT Diagnosis (PT) Impaired functional mobility.   Influenced by the following impairments impaired strength, increased pain, dec activity carrillo   Functional limitations due to impairments bed mobility, transfers and WC mobility.   Clinical Presentation (PT Evaluation Complexity) stable   Clinical Presentation Rationale Pt presents medically diagnosed.   Clinical Decision Making (Complexity) moderate complexity   Planned Therapy Interventions (PT) balance training;bed mobility training;strengthening;transfer training;wheelchair management/propulsion training   Risk & Benefits of therapy have been explained evaluation/treatment results reviewed;care plan/treatment goals reviewed;risks/benefits reviewed;patient;daughter;participants voiced agreement with care plan   PT Total Evaluation Time   PT Eval, Moderate Complexity Minutes (28605) 10   Physical Therapy Goals   PT Frequency Daily   PT Predicted Duration/Target Date for Goal Attainment 04/10/25   PT: Bed Mobility Supervision/stand-by assist;Supine to/from sit;Rolling   PT: Transfers Supervision/stand-by assist;Bed to/from chair  (pivot transfers bed<>chair/WC)   PT: Wheelchair Mobility Caregiver Min assist;50 feet   Interventions   Interventions Quick Adds Therapeutic Activity;Therapeutic Procedure   Therapeutic Procedure/Exercise   Ther. Procedure: strength, endurance, ROM, flexibillity Minutes (31112) 10    Treatment Detail/Skilled Intervention Seated bilat LE ex x 15 to 20 reps with cues for techniqiue and increased time to rest between ex.   Therapeutic Activity   Therapeutic Activities: dynamic activities to improve functional performance Minutes (16362) 10   Symptoms Noted During/After Treatment Fatigue;Shortness of breath  (O2 at 5 L NC.  O2 SATs 94% and  after mobility.)   Treatment Detail/Skilled Intervention Supine>sit with HOB elevated with pt using the rail with CGA with cues for technique. Some increased time to rest at the EOB due to increased SOB.  Sit scoot with CGA.  Pivot transfer bed> chair with min A x 1.  Pt did need more time to rest.  Cues for PLB.  Pt felt better sitting up.  Pt positioned for comfort.   PT Discharge Planning   PT Plan Bring manual wc to practice pivot transfers & wc mobility; stands for standing tolerance, BLE strengthening  (WC mobility.)   PT Discharge Recommendation (DC Rec) Transitional Care Facility   PT Rationale for DC Rec The pt does get really tired with all mobility and ex.  Pt does also have increased SOB.  Pt does need min A with transfers.  TCU is recommended to improve strength for mobility carrillo at home.   PT Brief overview of current status PT eval, bed mobility with CGA, Pivot transfers w min A x 1.  Incr time to rest due to increased SOB and fatigue.  LE ex.   PT Total Distance Amb During Session (feet) 1   PT Equipment Needed at Discharge walker, rolling;wheelchair;wheelchair cushion   Physical Therapy Time and Intention   Timed Code Treatment Minutes 20   Total Session Time (sum of timed and untimed services) 30

## 2025-04-03 NOTE — PROGRESS NOTES
SPEECH PATHOLOGY CLINICAL SWALLOW EVALUATION     04/03/25 0900   Appointment Info   Signing Clinician's Name / Credentials (SLP) Diego Mason MA [facility] SLP   General Information   Onset of Illness/Injury or Date of Surgery 04/02/25   Referring Physician Donald Carson MD   Pertinent History of Current Problem 89 year old female with PMHx of UTI, HFpEF, RBBB, CAD, DM2, Fatty Liver, HTN, HLD, IBS, GERD, who presented with acute resp failure with hypoxia. Discharged from hosp 3/26/25 with dx sepsis due to acute hypoxic resp failure, possible aspiration, UTI. Home on O2. Some coughing uring PO intake reported by provider and family.   General Observations Patient is alert mildly confused.  She denies ongoing coughing or difficulty swallowing while eating and says she only coughed that 1 time.  There are notations that up until recently she did have some occasional emesis upon waking up in the morning.   Type of Evaluation   Type of Evaluation Swallow Evaluation   Oral Motor   Oral Musculature generally intact   Mucosal Quality dry   Dentition (Oral Motor)   Comment, Dentition (Oral Motor) She reports she has lost some weight and her upper dentures do not fit well and do impact her ability to chew some solid foods.  She avoids most meats and hard crunchy foods.   Dentition (Oral Motor) natural dentition;dental appliance/dentures   Dental Appliance/Denture (Oral Motor) upper;poor fit   Facial Symmetry (Oral Motor)   Facial Symmetry (Oral Motor) WNL   Lip Function (Oral Motor)   Lip Range of Motion (Oral Motor) WNL   Lip Strength (Oral Motor) WFL   Comment, Lip Function (Oral Motor) mild generalized weakness   Tongue Function (Oral Motor)   Tongue Strength (Oral Motor) WFL;strength decreased   Tongue Coordination/Speed (Oral Motor) reduced rate   Tongue ROM (Oral Motor) WNL   Comment, Tongue Function (Oral Motor) Mild generalized weakness   Jaw Function (Oral Motor)   Jaw Function (Oral Motor) WNL   Cough/Swallow/Gag  Reflex (Oral Motor)   Volitional Throat Clear/Cough (Oral Motor) reduced strength   Comment, Cough/Swallow/Gag Reflex (Oral Motor) Unable to produce dry swallow due to dry oral mucosa   Vocal Quality/Secretion Management (Oral Motor)   Vocal Quality (Oral Motor) WFL   Secretion Management (Oral Motor) WNL   General Swallowing Observations   Past History of Dysphagia No previous oral pharyngeal dysphagia known   Respiratory Support oxygen mask;other (see comments)  (5L)   Current Diet/Method of Nutritional Intake (General Swallowing Observations, NIS) NPO   Swallowing Evaluation Clinical swallow evaluation   Clinical Swallow Evaluation   Clinical Swallow Evaluation Textures Trialed thin liquids;pureed;soft & bite-sized   Clinical Swallow Eval: Thin Liquid Texture Trial   Mode of Presentation, Thin Liquids spoon;straw   Volume of Liquid or Food Presented Ice chips by spoon and single sips of thin water by straw   Oral Phase of Swallow WFL   Pharyngeal Phase of Swallow intact   Diagnostic Statement Occasional audible swallow or double swallow   Clinical Swallow Evaluation: Puree Solid Texture Trial   Mode of Presentation, Puree spoon   Volume of Puree Presented 3 bites   Oral Phase, Puree WFL   Pharyngeal Phase, Puree intact   Clinical Swallow Eval: Soft & Bite Sized   Mode of Presentation spoon   Volume Presented She agreed to 2 bites of diced peaches   Oral Phase WFL   Pharyngeal Phase intact   Diagnostic Statement Minimally prolonged mastication but functional no oral stasis no overt signs and symptoms of aspiration   Esophageal Phase of Swallow   Patient reports or presents with symptoms of esophageal dysphagia No   Esophageal comments Occasional belching observed after swallows of all consistencies   Swallowing Recommendations   Diet Consistency Recommendations full liquid diet   Supervision Level for Intake 1:1 supervision needed   Recommended Feeding/Eating Techniques (Swallow Eval) maintain upright sitting  position for eating   Medication Administration Recommendations, Swallowing (SLP) Whole in pudding   Instrumental Assessment Recommendations VFSS (videofluoroscopic swallowing study)   Comment, Swallowing Recommendations No overt signs and symptoms of aspiration during clinical exam.  She did have occasional belching after swallows.  Will proceed with video swallow study recommend full liquid diet small sips meds whole in pudding until study can be completed.   Clinical Impression   Clinical Impression Comments Patient shows mild generalized weakness she is on 5 L oxy mask saturations are stable at 94%.  She has overall generalized weakness but oral motor movements are within functional limits.  She takes single sips of thin liquid from straw, ice chips from spoon, bites of purée, bites of diced peaches.  Intake is limited due to patient resistant to taking too much.  She does have loose upper dentures that make it difficult for her to chew solid foods.  Oral mastication and manipulation is slow but functional for all consistencies.  She has no anterior oral loss.  Swallows are observed with occasional double swallows, occasional audible swallows particularly with thin liquids.  There is no overt signs and symptoms of aspiration, no cough, no throat clear, no change in vocal quality or oxygen saturations.  She has occasional belching after swallows of all consistencies.  She also reports to me that she has occasional heartburn and in the past had some vomiting upon waking up in the morning but this has not happened recently.   SLP Total Evaluation Time   Eval: oral/pharyngeal swallow function, clinical swallow Minutes (24161) 20   SLP Discharge Planning   SLP Plan Video swallow study and establish goals.  Recommend full liquid diet, small sips, meds whole in pudding until swallow study can be completed   SLP Rationale for DC Rec TBD pending VSS   SLP Brief overview of current status  No overt signs and symptoms of  aspiration during clinical exam. She did have occasional belching after swallows. Will proceed with video swallow study recommend full liquid diet small sips meds whole in pudding until study can be completed.

## 2025-04-03 NOTE — PROGRESS NOTES
[facility]    Progress Note - Hospitalist Service       Date of Admission:  4/2/2025    Assessment & Plan   Maranda Downing is a 89 year old female admitted on 4/2/2025. She has a history of recurrent UTI, HFpEF, RBBB, CAD, DM2, Fatty Liver, HTN, HLD, IBS, GERD, Thyroid Nodule who is admitted for worsening shortness of breath and hypoxia likely secondary to chronic lung disease with possible aspiration.     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 Discharged on 2L NC a week ago. Now worsening for 4 days on 4-5L NC on admission with nonproductive cough, CT on admission with worsening groundglass opacities and fibrosis, along with pneumonitis. Pulmonary HTN seen as well, echocardiogram 2021 with LVEF 55-60%. Rales diffusely on lung exam though related to likely lung disease.  Patient and daughter do report more coughing when eating and drinking lately, possible contribution of aspiration contributing. Otherwise vitally normal, no leukocytosis, procal normal reassuring against new acute infection. VBG with compensated respiratory acidosis.   - Pulmonology consulted, appreciate recs   - Suspect chronic aspiration   - Zosyn   - Methylprednisolone  - SLP    - Passed initial swallow study, video swallow study needed 4/3 afternoon  - Oxygen as needed, wean as able   - PT/OT  - Care management  - Delirium precautions     HFpEF  Paroxysmal Atrial Fibrillation  Elevated troponin  Afib noted on chart review, rate controlled on admission and not on meds. Not on anticoagulation with history of recurrent bleeding. Troponin 27 -> 23 on admission. BNP 2,500 on admission, similar to 1 week ago. Echocardiogram 2021 with LVEF 55-60%. Otherwise does not appear significantly hypervolemic on exam.  - Echo updated    - LVEF 55-60%  with diastolic dysfunction   - Moderate aortic and mitral regurgitation   - Elevated right ventricular systolic pressure  - PTA Kcl capsules 10 mEq BID  - PTA lasix 60 mg PO daily     Intermittent Vomiting  Patient notes she does sometimes vomit in AM, has not been a problem as much recently. Not nausea now. Could contribute to aspiration  - Will continue to monitor     Chronic T10, T12, L1, L3 Fractures  Noted on CXR, known to patient, stable.     T2DM  Not on PTA meds, multiple A1cs in last year under 6, most recent 9 months ago 5.5. Glucose on admission 116.   - Will monitor daily BMP, consider sliding scale insulin if needed or steroids given     Sacral Wound  - Noted on admission, mildly tender. Vitals and labs reassuring against systemic infection  - WOC consulted     Chronic conditions:  Depression/Anxiety - PTA sertraline, buspar, mirtazapine  Chronic diarrhea - PTA PRN loperamide  HTN, HLD, GERD, CAD - not on PTA meds          Diet: NPO for Medical/Clinical Reasons Except for: No Exceptions    DVT Prophylaxis: Enoxaparin (Lovenox) SQ  Driver Catheter: Not present  Fluids: NPO  Lines: None     Cardiac Monitoring: None  Code Status: No CPR- Do NOT Intubate      Clinically Significant Risk Factors Present on Admission                   # Hypertension: Noted on problem list               # Financial/Environmental Concerns:           Social Drivers of Health    Received from [employer]    [employer]         Disposition Plan     Medically Ready for Discharge: Anticipated in 2-4 Days         The patient's care was discussed with the Attending Physician, Dr. Torres .    Donald Calderon MD  Hospitalist Service  [facility]  Securely message with [employer] (more info)  Text page via [employer] Paging/Directory   ______________________________________________________________________    Interval History   NAEO. NPO overnight with concern for aspiration. SLP to  see today.     Feeling a little better today but about the same. Passed initial swallow study, will need video swallow study today.    Physical Exam   Vital Signs: Temp: 97.9  F (36.6  C) Temp src: Oral BP: 110/59 Pulse: 87   Resp: 20 SpO2: 93 % O2 Device: Oxymask Oxygen Delivery: 5 LPM  Weight: 129 lbs 0 oz    Constitutional: awake, alert, cooperative, lying in bed in no apparent distress  Eyes: Pupils equal, round and reactive to light, extra ocular muscles intact, sclera clear, conjunctiva normal  ENT: normocephalic, without obvious abnormality, atraumatic  Neck: No cervical or supraclavicular lymphadenopathy  Respiratory: Fine crackles diffusely in bilateral lung fields. Fair air movement throughout. No increased work of breathing, no wheezing  Cardiovascular: Irregularly irregular. 2/6 systolic murmur loudest RUSB/LUSB. Normal S1 and S2, no S3 or S4  GI: Normal bowel sounds, soft, non-distended, no masses palpated, no hepatosplenomegaly  Skin: No other rashes or lesions visualized on exposed skin. Trace to 1+ edema in BLE  Musculoskeletal: There is no redness, warmth, or swelling of the joints. Moves all four extremities spontaneously.  Neurologic: Cranial nerves II-XII are grossly intact.  Neuropsychiatric: General: normal, calm, and normal eye contact  Level of consciousness: alert / normal  Affect: normal and pleasant    Medical Decision Making       Please see A&P for additional details of medical decision making.      Data     I have personally reviewed the following data over the past 24 hrs:    5.9  \   11.0 (L)   / 316     141 98 24.1 (H) /  129 (H)   4.3 31 (H) 0.95 \     Trop: 23 (H) BNP: N/A       Imaging results reviewed over the past 24 hrs:   Recent Results (from the past 24 hours)   Echocardiogram Complete   Result Value    LVEF  55-60%    Narrative    [id number]  [id number]  [id number]  [id number]^[address]^[place] [address]     Name: LILLIAN  NNEKA GUPTA  MRN: [id number]  : 1935  Study Date: 2025 08:01 AM  Age: 89 yrs  Gender: Female  Patient Location: [facility]  Reason For Study: Pulmonary Hypertension  Ordering Physician: BISMARK DEWEY  Referring Physician: BISMARK DEWEY  Performed By:      BSA: 1.6 m2  Height: 62 in  Weight: 129 lb  HR: 92  BP: 138/59 mmHg  ______________________________________________________________________________  Procedure  Echocardiogram with two-dimensional, color and spectral Doppler. Definity (NDC  #[id number]) given intravenously. Adequate quality two-dimensional was  performed and interpreted.  ______________________________________________________________________________  Interpretation Summary     LV ejection fraction is 55-60%. Grade III or advanced diastolic dysfunction.  The right ventricle is mildly dilated. The right ventricular systolic function  is normal.  Mild valvular aortic stenosis. There is moderate (2+) aortic regurgitation.  There is moderate (2+) mitral regurgitation.  There is mild to moderate (1-2+) tricuspid regurgitation. The right  ventricular systolic pressure is approximated at 33.1 mmHg plus the right  atrial pressure.     ______________________________________________________________________________  Left Ventricle  The left ventricle is normal in size. There is normal left ventricular wall  thickness. A sigmoid septum is present. Left ventricular systolic function is  normal. The visual ejection fraction is 55-60%. Grade III or advanced  diastolic dysfunction. No regional wall motion abnormalities noted.     Right Ventricle  The right ventricle is mildly dilated. The right ventricular systolic function  is normal.     Atria  Normal left atrial size. The left atrium is moderate to severely dilated.  Right atrium not well visualized.     Mitral Valve  There is moderate to severe mitral annular calcification. There is moderate  (2+) mitral regurgitation. There is no  mitral valve stenosis.     Tricuspid Valve  Tricuspid valve leaflets appear normal. There is mild to moderate (1-2+)  tricuspid regurgitation. The right ventricular systolic pressure is  approximated at 33.1 mmHg plus the right atrial pressure.     Aortic Valve  The aortic valve is trileaflet with aortic valve sclerosis. There is moderate  (2+) aortic regurgitation. The mean AoV pressure gradient is 10.8 mmHg. Mild  valvular aortic stenosis.     Pulmonic Valve  The pulmonic valve is not well visualized. There is trace pulmonic valvular  regurgitation.     Vessels  The aorta root is normal.     Pericardium  There is no pericardial effusion.     Rhythm  Sinus rhythm was noted.  ______________________________________________________________________________  MMode/2D Measurements & Calculations  IVSd: 0.55 cm     LVIDd: 4.9 cm  LVIDs: 2.3 cm  LVPWd: 0.52 cm  FS: 52.2 %  LV mass(C)d: 80.0 grams  LV mass(C)dI: 50.4 grams/m2  Ao root diam: 2.7 cm  LA dimension: 4.2 cm  asc Aorta Diam: 3.4 cm  LA/Ao: 1.5  LVOT diam: 1.9 cm  LVOT area: 2.9 cm2  Ao root diam index Ht(cm/m): 1.7  Ao root diam index BSA (cm/m2): 1.7  Asc Ao diam index BSA (cm/m2): 2.1  Asc Ao diam index Ht(cm/m): 2.1  EF Biplane: 69.2 %     LA Volume Indexed (AL/bp): 46.0 ml/m2  RV Base: 3.7 cm  RWT: 0.21  TAPSE: 1.3 cm     Time Measurements  MM HR: 94.0 BPM     Doppler Measurements & Calculations  MV E max tiago: 130.0 cm/sec  MV A max tiago: 53.4 cm/sec  MV E/A: 2.4  MV max P.4 mmHg  MV mean P.4 mmHg  MV V2 VTI: 31.0 cm  MVA(VTI): 1.9 cm2  MV dec slope: 683.1 cm/sec2  MV dec time: 0.19 sec  Ao V2 max: 192.4 cm/sec  Ao max PG: 15.0 mmHg  Ao V2 mean: 156.2 cm/sec  Ao mean PG: 10.8 mmHg  Ao V2 VTI: 46.7 cm  KETTY(I,D): 1.3 cm2  KETTY(V,D): 1.3 cm2  AI P1/2t: 308.4 msec  LV V1 max PG: 3.1 mmHg  LV V1 max: 88.6 cm/sec  LV V1 VTI: 20.5 cm     SV(LVOT): 58.6 ml  SI(LVOT): 36.9 ml/m2  PA acc time: 0.07 sec  TR max tiago: 287.8 cm/sec  TR max P.1 mmHg  AV Tiago Ratio  (DI): 0.46  KETTY Index (cm2/m2): 0.79  E/E' av.3  Lateral E/e': 13.2  Medial E/e': 21.3  RV S Tiago: 12.2 cm/sec     ______________________________________________________________________________  Report approved by: Heri Lisa on 2025 09:12 AM

## 2025-04-03 NOTE — PROGRESS NOTES
"   04/03/25 1030   Appointment Info   Signing Clinician's Name / Credentials (OT) Micki Dejeuss, OTR/L   Living Environment   People in Home facility resident   Current Living Arrangements assisted living   Home Accessibility wheelchair accessible   Living Environment Comments Pt states she has a toilet with a grab bar.   Self-Care   Equipment Currently Used at Home wheelchair, manual   Fall history within last six months no   Activity/Exercise/Self-Care Comment Independent with ADLs and pivot transfers W/C-based at baseline per pt report. Per chart, has assist with dressing and bathing at baseline.   Instrumental Activities of Daily Living (IADL)   IADL Comments Pt states she has assist with IADLs through jail.   General Information   Onset of Illness/Injury or Date of Surgery 04/02/25   Referring Physician Donald Calderon MD   Patient/Family Therapy Goal Statement (OT) none stated   Additional Occupational Profile Info/Pertinent History of Current Problem Per chart review, pt \"is a 89 year old female admitted on 4/2/2025. She has a history of recurrent UTI, HFpEF, RBBB, CAD, DM2, Fatty Liver, HTN, HLD, IBS, GERD, Thyroid Nodule who is admitted for worsening shortness of breath and hypoxia likely secondary to chronic lung disease.\"   Existing Precautions/Restrictions fall;oxygen therapy device and L/min  (4 LPM via NC during eval)   General Observations and Info Desats and SOB with minimal activity.   Cognitive Status Examination   Orientation Status orientation to person, place and time  (A&Ox3)   Cognitive Status Comments difficult to obtain straightforward answers from pt   Pain Assessment   Patient Currently in Pain Yes, see Vital Sign flowsheet  (buttocks wounds)   Range of Motion Comprehensive   General Range of Motion no range of motion deficits identified   Strength Comprehensive (MMT)   Comment, General Manual Muscle Testing (MMT) Assessment Generalized weakness noted functionally.   Bed Mobility   Bed " Mobility scooting/bridging   Scooting/Bridging [place] (Bed Mobility) dependent (less than 25% patient effort)   Supine-Sit [place] (Bed Mobility) minimum assist (75% patient effort)   Sit-Supine [place] (Bed Mobility) minimum assist (75% patient effort)   Assistive Device (Bed Mobility) bed rails   Transfers   Transfers toilet transfer   Toilet Transfer   Type (Toilet Transfer) squat pivot   Toilet Transfer Comments first attempt unsuccessful with Mod A, needed to return to seated position on EOB for safety   Balance   Balance Comments SBA unsupported sitting balance   Activities of Daily Living   BADL Assessment/Intervention lower body dressing   Lower Body Dressing Assessment/Training   Comment, (Lower Body Dressing) SBA doffing/donning socks seated EOB. Per clinical reasoning, anticipate pt would have difficulty performing lower body dressing pants d/t low activity tolerance.   Clinical Impression   Criteria for Skilled Therapeutic Interventions Met (OT) Yes, treatment indicated   OT Diagnosis Impaired ability to perform ADLs and transfers.   Influenced by the following impairments ILD   OT Problem List-Impairments impacting ADL problems related to;activity tolerance impaired;cognition;fear & anxiety;strength;mobility   Assessment of Occupational Performance 3-5 Performance Deficits   Identified Performance Deficits toileting, lower body dressing, cognition, functional endurance   Planned Therapy Interventions (OT) ADL retraining;balance training;cognition;strengthening;transfer training;home program guidelines;risk factor education;progressive activity/exercise   Clinical Decision Making Complexity (OT) detailed assessment/moderate complexity   Risk & Benefits of therapy have been explained evaluation/treatment results reviewed;care plan/treatment goals reviewed;risks/benefits reviewed;current/potential barriers reviewed;participants voiced agreement with care plan;participants included;patient    OT Total Evaluation Time   OT Eval, Moderate Complexity Minutes (59470) 10   OT Goals   Therapy Frequency (OT) 6 times/week   OT Predicted Duration/Target Date for Goal Attainment 04/10/25   OT: Hygiene/Grooming modified independent;using adaptive equipment;while standing   OT: Lower Body Dressing Modified independent;using adaptive equipment   OT: Toilet Transfer/Toileting Modified independent;toilet transfer;cleaning and garment management;using adaptive equipment   OT: Cognitive Patient/caregiver will verbalize understanding of cognitive assessment results/recommendations as needed for safe discharge planning   Self-Care/Home Management   Self-Care/Home Mgmt/ADL, Compensatory, Meal Prep Minutes (43232) 23   Symptoms Noted During/After Treatment (Meal Preparation/Planning Training) fatigue;shortness of breath;significant change in vital signs   Treatment Detail/Skilled Intervention Provided cueing for hand placement for additional transfer trial EOB>BSC, able to complete with Min A following cueing. Pt with SOB and desat to 82% on 4 LPM via NC following transfer, required extended seated rest break and cueing for PLB tech to recover to 90%. CGA for pivot transfer BSC>EOB. Pt desat again to 84% on 4 LPM via NC requiring >4 min to recover to 90%. Pt desat following lower body dressing task, required cueing for PLB tech while seated EOB to recover. Pt engaged in rolling in bed for repositioning with Min A, fatigued quickly. Pt left in bed at end of session with bed alarm on, call light in reach, and RN present in room.   OT Discharge Planning   OT Plan close transfers (pt only pivots at baseline), lower body dressing, functional endurance (monitor O2), consider SLUMS   OT Discharge Recommendation (DC Rec) Transitional Care Facility   OT Rationale for DC Rec Pt requiring Ax1 for all ADLs and transfers, fatiguing very quickly. Pt reports she is independent with transfers/ADLs typically, has the help available through  "[facility] staff but \"they are not very prompt\". At this point, pt would likely benefit from TCU stay to promote safety and functional endurance. With increased assistance from [facility] pt may progress to discharging back to facility with HC therapy services.   OT Brief overview of current status Min A bed mobility, CGA-Min A pivot transfer, SBA seated lower body dressing   OT Total Distance Amb During Session (feet) 0   Total Session Time   Timed Code Treatment Minutes 23   Total Session Time (sum of timed and untimed services) 33     "

## 2025-04-03 NOTE — PROGRESS NOTES
SPEECH PATHOLOGY VIDEO SWALLOW STUDY     04/03/25 1500   Appointment Info   Signing Clinician's Name / Credentials (SLP) Diego Mason MA [facility]   General Information   Onset of Illness/Injury or Date of Surgery 04/03/25   Referring Physician Donald Calderon MD   Pertinent History of Current Problem 89 year old female with PMHx of UTI, HFpEF, RBBB, CAD, DM2, Fatty Liver, HTN, HLD, IBS, GERD, who presented with acute resp failure with hypoxia. Discharged from hosp 3/26/25 with dx sepsis due to acute hypoxic resp failure, possible aspiration, UTI. Home on O2. Some coughing uring PO intake reported by provider and family. She had no s/s aspiration during clinical bedside exam so proceeding with video swallow study.   General Swallowing Observations   Swallowing Evaluation Videofluoroscopic swallow study (VFSS)   VFSS Evaluation   Radiologist Dr. Vaca   Views Taken left lateral   VFSS Textures Trialed thin liquids;mildly thick liquids;pureed;soft & bite-sized   VFSS Eval: Thin Liquid Texture Trial   Mode of Presentation, Thin Liquid cup;straw   Order of Presentation 1, 2, 3, 4, 10, 11   Preparatory Phase other (see comments)  (piecemeal with smal amount held after initial swallow)   Oral Phase, Thin Liquid premature pharyngeal entry;residue in oral cavity   Bolus Location When Swallow Triggered posterior laryngeal surface of epiglottis;pyriforms   Pharyngeal Phase, Thin Liquid WFL   Rosenbek's Penetration Aspiration Scale: Thin Liquid Trial Results 3 - contrast remains above the vocal cords, visible residue remains (penetration)   Strategies and Compensations reduce bolus size;double swallow;chin tuck   Diagnostic Statement Was not very aware of oral residual, when cued to swallow again, swallow was significantly delayed and disorganized and deep laryngeal penetration occurred. Inconsistent shallow-mid penetration, trace undercoating of the epiglottis that eventually trickled down to vocal cords with spontaneous  throat clear. No significant benefit with chin tuck to reduce delay and oral stasis remained.   VFSS Eval: Mildly Thick Liquids   Mode of Presentation cup   Order of Presentation 5, 6   Preparatory Phase other (see comments)  (similar to thin liquids)   Oral Phase premature pharyngeal entry;residue in oral cavity   Bolus Location When Swallow Triggered posterior laryngeal surface of epiglottis;pyriforms   Pharyngeal Phase WFL   Rosenbek's Penetration Aspiration Scale 3 - contrast remains above the vocal cords, visible residue remains (penetration)   Diagnostic Statement Trace residual undercoating epiglottis with laryngeal penetration. Needed cues to swallow oral residual.   VFSS Evaluation: Puree Solid Texture Trial   Mode of Presentation, Puree spoon   Order of Presentation 7, 8   Preparatory Phase prolonged bolus preparation;other (see comments)  (piecemeal swallows)   Oral Phase, Puree residue in oral cavity   Bolus Location When Swallow Triggered posterior angle of ramus;valleculae   Pharyngeal Phase, Puree WFL   Rosenbek's Penetration Aspiration Scale: Puree Food Trial Results 1 - no aspiration, contrast does not enter airway   Diagnostic Statement Piecemeal oral transit/swallow, appeared more aware of residual and had spontaneous swallows to clear.   VFSS Eval: Soft & Bite Sized   Mode of Presentation spoon   Order of presentation 9, 11   Preparatory Phase prolonged bolus preparation;other (see comments)  (piecemeal)   Oral Phase residue in oral cavity   Bolus Location When Swallow Triggered posterior angle of ramus;valleculae   Pharyngeal Phase WFL;pharyngeal wall coating;residue in vallecula   Rosenbek's Penetration Aspiration Scale 1 - no aspiration, contrast does not enter airway   Strategies and Compensations liquid wash   Diagnostic Statement Piecemeal oral transit/swallow, appeared more aware of residual and had spontaneous swallows to clear. Added sip of thin to further clear trace amounts to  valleculae, tongue back, and posterior pharyngeal wall without penetration.   Esophageal Phase of Swallow   Patient reports or presents with symptoms of esophageal dysphagia Yes   Esophageal comments frequent belching and belching followed by cough after the end of the study   Swallowing Recommendations   Diet Consistency Recommendations thin liquids (level 0);easy to chew (level 7)   Supervision Level for Intake distant supervision needed   Mode of Delivery Recommendations bolus size, small;no straws   Swallowing Maneuver Recommendations double dry swallow   Recommended Feeding/Eating Techniques (Swallow Eval) maintain upright sitting position for eating;maintain upright posture during/after eating for 30 minutes   Medication Administration Recommendations, Swallowing (SLP) whole in puddding   Comment, Swallowing Recommendations Inconsistent laryngeal penetration with thin and mildly thick liquids due to delayed swallow and oral residual. No aspiration occurred. Recommend Easy to Chew diet w/thin liquids, no straws, meds whole in pudding. Frequent belching followed by cough with intake. May have esophageal dysphagia contributing to aspiration risks.   Clinical Impression   Criteria for Skilled Therapeutic Interventions Met (SLP Eval) Yes, treatment indicated   SLP Diagnosis Dysphagia   Problem List (SLP) risk of aspiration   Risks & Benefits of therapy have been explained evaluation/treatment results reviewed;care plan/treatment goals reviewed;risks/benefits reviewed;current/potential barriers reviewed;participants voiced agreement with care plan;participants included;patient   Clinical Impression Comments Video swallow study completed showing prolonged oral phase with all consistencies. She tends to swallow majority of boluses on first swallow with small remaining bolus mid tongue. With thin and mildly thick she appeared to hold bolus or was unaware it was there. Cues to swallow again resulted in significant delay  and disorganized swallow, greater risk of laryngeal penetration. Swallow response is overall delayed particularly with thin and mildly thick, triggering as boluses pass the epiglottis or enter the pyriform sinuses. Inconsistent laryngeal penetration occurred with thin and mildly thick at the start or during the swallows. No true aspiration occurred. There was occasional trace residual undercoating of the epiglottis that trickled to the vocal cords, she did have spontaneous productive throat clear/cough. Once triggered pharyngeal swallow is largely intact and functional for patients age. She has good hyolaryngeal movement, epiglottic inversion, and UES opening. There may be trace residual of thicker consistencies along the tongue back, valleculae, and posterior pharyngeal wall which is considered WNL for patients age. She is at greater risk for penetration/aspiration with use of a straw due to delay. A chin tuck resulting in equally delayed swallow and spill to the pyriform sinuses and oral residual which was more difficult to transfer orally with chin tucked. Of note she has frequent belching followed by coughing with intake, concerning for esophageal dysphagia which may contribute to aspiration risk.   SLP Total Evaluation Time   Eval: oral/pharyngeal swallow function, clinical swallow Minutes (98704) 25   SLP Goals   Therapy Frequency (SLP Eval) 4 times/week   SLP Predicted Duration/Target Date for Goal Attainment 04/11/25   SLP Goals Swallow   SLP: Safely tolerate diet without signs/symptoms of aspiration Easy to chew diet;Thin liquids;With use of compensatory swallow strategies   SLP Discharge Planning   SLP Plan Fri 0/4; EC w/thin, try denture adhesive vs no upper denture if she's willing. Indep strategies: no straws, small sips, double swallow to clear oral cavity, upright positioning. Belching after swallows, ?esophageal dysphagia increasing asp risk.   SLP Brief overview of current status  Inconsistent  laryngeal penetration with thin and mildly thick liquids due to delayed swallow and oral residual. No aspiration occurred. Recommend Easy to Chew diet w/thin liquids, no straws, meds whole in pudding. Frequent belching followed by cough with intake. May have esophageal dysphagia contributing to aspiration risks.   SLP Time and Intention   Total Session Time (sum of timed and untimed services) 25

## 2025-04-03 NOTE — PLAN OF CARE
Problem: Delirium  Goal: Improved Behavioral Control  Outcome: Progressing  Intervention: Minimize Safety Risk  Recent Flowsheet Documentation  Taken 4/3/2025 0800 by Matias Myers RN  Enhanced Safety Measures: review medications for side effects with activity  Trust Relationship/Rapport: care explained     Problem: Gas Exchange Impaired  Goal: Optimal Gas Exchange  Outcome: Progressing  Intervention: Optimize Oxygenation and Ventilation  Recent Flowsheet Documentation  Taken 4/3/2025 0800 by Matias Myers RN  Head of Bed (HOB) Positioning: HOB at 20-30 degrees   Goal Outcome Evaluation:       Pt alert but disoriented to place and situation. Forgetful. Denies acute pain. SOB w/ exertion, desats with activity. Loose BM x 1. On 4-5L NC.

## 2025-04-03 NOTE — PROGRESS NOTES
Pulmonary/Critical Care Consult Team Note    Maranda Downing,  1935, MRN [id number]  Admitting Dx: Hypoxia [R09.02]  ILD (interstitial lung disease) (H) [J84.9]  Date / Time of Admission:  2025 10:12 AM    She is feeling much better today  She is up in the chair and much more interactive  She worked with PT  She is on 4l NC    Assessment/Plan: Maranda Downing is a 89 year old female with PMHx of UTI, HFpEF, RBBB, CAD, DM2, Fatty Liver, HTN, HLD, IBS, GERD, who presented with acute resp failure with hypoxia due to chronic aspiration.    Acute resp failure with hypoxia due to chronic aspiration.  - SLP video swallow  - Zosyn  - Steroids for the pneumonitis    Medical Care Time excluding procedures and family discussions greater than: 45 Minutes    Risk Factors Present on Admission:  Clinically Significant Risk Factors Present on Admission                   # Hypertension: Noted on problem list  # Chronic heart failure with preserved ejection fraction: heart failure noted on problem list and last echo with EF >50%              # Financial/Environmental Concerns:         Code Status: No CPR- Do NOT Intubate         Gisela Paniagua, DO  Pulmonary and Critical Care Attending  pgr [phone]    Allergies   Allergen Reactions    Codeine Nausea    Hydrocodone      Other reaction(s): GI Upset  nausea    Iodinated Contrast Media     Levofloxacin      Other reaction(s): Intolerance-Can't Take  Made leg pain worse.    Lisinopril Cough    Morphine Nausea and Vomiting     Other reaction(s): Nausea/Vomiting    Sulfa Antibiotics Nausea    Penicillins Rash     Tolerated zosyn 2025       Meds: See MAR    Physical Exam:  /59 (BP Location: Left arm)   Pulse 90   Temp 97.9  F (36.6  C) (Oral)   Resp 20   Wt 58.5 kg (129 lb)   SpO2 96%   BMI 23.59 kg/m    Intake/Output this shift:  No intake/output data recorded.  GEN: sitting up in a chair, NAD  HEENT: MMM, NC in place  CVS: regular rhythm, no  murmurs  RESP: bilateral rhonchi, no wheezing  ABD: Soft, No abdominal pain with palpation, no guarding, no rigidity  EXT: Warm, well perfused, no LE edema   NEURO:  no focal deficits  PSYCH: pleasant    Pertinent Labs: Latest lab results in EHR personally reviewed.   CMP  Recent Labs   Lab 04/03/25  1125 04/03/25  0701 04/02/25  1116 03/31/25  1135   NA  --  141 143 141   POTASSIUM  --  4.3 4.4 4.3   CHLORIDE  --  98 98 101   CO2  --  31* 35* 28   ANIONGAP  --  12 10 12   * 114* 116* 113*   BUN  --  24.1* 17.7 21.3   CR  --  0.95 0.89 0.80   GFRESTIMATED  --  57* 61 70   HETAL  --  9.0 9.2 9.2     CBC  Recent Labs   Lab 04/03/25  0701 04/02/25  1116 03/31/25  1135   WBC 5.9 7.9 9.1   RBC 3.65* 3.80 3.61*   HGB 11.0* 11.6* 11.1*   HCT 34.8* 35.9 35.9   MCV 95 95 99   MCH 30.1 30.5 30.7   MCHC 31.6 32.3 30.9*   RDW 12.2 12.6 12.6    319 347     INRNo lab results found in last 7 days.  Arterial Blood Gas  Recent Labs   Lab 04/02/25  1116   O2PER 40       Cultures: not yet available.      Imaging: personally reviewed.     EXAM: CT CHEST W/O CONTRAST  LOCATION: [facility]  DATE: 4/2/2025     INDICATION: Worsening hypoxia and dyspnea  COMPARISON: Chest x-ray 3/26/2025, CT chest 20 2022 and older studies  TECHNIQUE: CT chest without IV contrast. Multiplanar reformats were obtained. Dose reduction techniques were used.  CONTRAST: None.     FINDINGS:   LUNGS AND PLEURA: Extensive, heterogeneous groundglass opacities are noted bilaterally, left greater than right with areas of segmental sparing posteriorly in the right upper and middle lobes and anteriorly in the right lower lobe. No intralobular septal   thickening. Changes are superimposed over chronic areas of subpleural reticulation, traction bronchiectasis and a few subpleural cysts in the left upper lobe anteriorly. There is a very small left pleural effusion.     MEDIASTINUM/AXILLAE: Less than 1 cm right thyroid nodule is  unchanged. Main pulmonary artery is enlarged at 3.8 cm. Extensive calcification aorta and branches. No aneurysm. Extensive mitral annular calcifications.     CORONARY ARTERY CALCIFICATION: Mild.     UPPER ABDOMEN: Quite distended gallbladder with large gallstone in the neck and hyperdense bile and a few layering smaller stones dependently. Appearance is unchanged and there are no inflammatory changes about the gallbladder. Scattered colonic   diverticulosis. Left renal cyst again noted. Aorta and branches are heavily calcified.     MUSCULOSKELETAL: Patient's developed new vertebral body compression fractures since 2022. There is a new vertebral plana at L1, compression of the superior endplate of L3 with sclerosis and 50% loss of vertebral body height. Compression fractures at T12   and T10 are unchanged.                                                                      IMPRESSION:   1.  Patient has developed heterogeneous, diffuse groundglass opacities without intralobular septal thickening, left greater than right. Changes are superimposed over underlying pulmonary fibrosis, non-UIP pattern as noted above. Pattern suggests acute   pneumonitis. Consider pulmonary consultation.  2.  Trace left effusion without  intralobular septal thickening to suggest failure.  3.  Enlarged main pulmonary artery consistent with pulmonary arterial hypertension.  4.  Compression fractures at L1 and L3, new since 2022.  5.  Cholelithiasis with very large gallstone in the gallbladder neck and no signs of acute inflammation, unchanged.  6.  Other noncritical findings as noted above.    Results for orders placed during the hospital encounter of 03/24/25    XR Chest Port 1 View    Narrative  EXAM: XR CHEST PORT 1 VIEW  LOCATION: [facility]  DATE: 3/26/2025    INDICATION: Difficulty weaning oxygen, evaluate for new changes  COMPARISON: X-ray 3/24/2025    Impression  IMPRESSION: Persistent low lung volumes.  Stable central vascular congestion. Diffuse bilateral multilobar reticulation with superimposed patchy opacities. Slightly improved aeration within the left upper lobe and left midlung. Otherwise, no significant  change since the prior exam. No definite pleural effusion. Stable heart size. Heavily calcified thoracic aorta.      Patient Active Problem List   Diagnosis    Type 2 diabetes mellitus with diabetic chronic kidney disease, unspecified CKD stage, unspecified whether long term insulin use (H)    Chronic diarrhea    Anxiety state    Allergic rhinitis    AK (actinic keratosis)    Acquired absence of other left toe(s)    Abdominal pain    Coronary artery disease of native artery of native heart with stable angina pectoris    Cystitis cystica    Constipation    Dysesthesia    Fall    Fatty liver    Fibromyalgia    Gastroesophageal reflux disease without esophagitis    Genital herpes    Hammertoe of left foot    Hypercalcemia    Hypercholesteremia    Hypertension    Hypokalemia    Hypoxia    Osteoporosis    OA (osteoarthritis) of knee    Nausea & vomiting    Mild cognitive impairment    Midline thoracic back pain    Irritable bowel syndrome    Overflow diarrhea    Vitamin B 12 deficiency    Stage 3 chronic kidney disease, unspecified whether stage 3a or 3b CKD (H)    Sensorineural hearing loss, bilateral    Rosacea    Right bundle branch block    Pulmonary hypertension (H)    PPD positive    Paroxysmal atrial tachycardia    Noninfectious gastroenteritis    Elevated troponin    Diverticular disease    Acute on chronic diastolic congestive heart failure (H)    Urinary retention    Acute cystitis without hematuria    History of aspiration pneumonia    Paroxysmal atrial fibrillation (H)    Acute and chronic respiratory failure with hypoxia (H)    Failure to attend appointment    Pneumonia of left lower lobe due to other aerobic gram-negative bacteria (H)    Sepsis due to undetermined organism (H)    Fresh blood passed  per rectum    Bronchiectasis with acute lower respiratory infection (H)    Abnormal CT scan of lung    Meconium aspiration pneumonia, unspecified laterality, unspecified part of lung    Dysphagia, unspecified type    Acute pulmonary embolism, unspecified pulmonary embolism type, unspecified whether acute cor pulmonale present (H)    Gastroesophageal reflux disease with esophagitis without hemorrhage    Acute deep vein thrombosis (DVT) of left lower extremity, unspecified vein (H)    Acute respiratory failure with hypoxia (H)    Congestive heart failure, unspecified HF chronicity, unspecified heart failure type (H)    Acute cystitis with hematuria    ILD (interstitial lung disease) (H)     Gisela Paniagua DO  Pulmonary and Critical Care Attending  pgr [phone]    Securely message with the Vocera Web Console (learn more here)

## 2025-04-03 NOTE — PLAN OF CARE
Problem: Adult Inpatient Plan of Care  Goal: Plan of Care Review  Description: The Plan of Care Review/Shift note should be completed every shift.  The Outcome Evaluation is a brief statement about your assessment that the patient is improving, declining, or no change.  This information will be displayed automatically on your shiftnote.  Outcome: Progressing     Problem: Adult Inpatient Plan of Care  Goal: Optimal Comfort and Wellbeing  Outcome: Progressing  Intervention: Provide Person-Centered Care  Recent Flowsheet Documentation  Taken 4/2/2025 1774 by Manuela Patel RN  Trust Relationship/Rapport: care explained   Goal Outcome Evaluation:    Patient is alert and oriented x2, confused. Vitally stable. On 5L [place], sats >90%. NPO until speech assess. Pt had one large incontinent BM, incontinent care provided. Ambulate to bedside commode with assist of 2. Bed alarm on for safety.

## 2025-04-04 ENCOUNTER — APPOINTMENT (OUTPATIENT)
Dept: SPEECH THERAPY | Facility: HOSPITAL | Age: OVER 89
DRG: 196 | End: 2025-04-04
Payer: MEDICARE

## 2025-04-04 ENCOUNTER — APPOINTMENT (OUTPATIENT)
Dept: OCCUPATIONAL THERAPY | Facility: HOSPITAL | Age: OVER 89
DRG: 196 | End: 2025-04-04
Payer: MEDICARE

## 2025-04-04 ENCOUNTER — TELEPHONE (OUTPATIENT)
Dept: PULMONOLOGY | Facility: CLINIC | Age: OVER 89
End: 2025-04-04
Payer: MEDICARE

## 2025-04-04 VITALS
WEIGHT: 127.87 LBS | BODY MASS INDEX: 22.66 KG/M2 | TEMPERATURE: 98.6 F | SYSTOLIC BLOOD PRESSURE: 113 MMHG | RESPIRATION RATE: 18 BRPM | OXYGEN SATURATION: 97 % | HEIGHT: 63 IN | DIASTOLIC BLOOD PRESSURE: 60 MMHG | HEART RATE: 94 BPM

## 2025-04-04 LAB
ANION GAP SERPL CALCULATED.3IONS-SCNC: 8 MMOL/L (ref 7–15)
BUN SERPL-MCNC: 32.6 MG/DL (ref 8–23)
CALCIUM SERPL-MCNC: 8.8 MG/DL (ref 8.8–10.4)
CHLORIDE SERPL-SCNC: 100 MMOL/L (ref 98–107)
CREAT SERPL-MCNC: 1.09 MG/DL (ref 0.51–0.95)
EGFRCR SERPLBLD CKD-EPI 2021: 48 ML/MIN/1.73M2
ERYTHROCYTE [DISTWIDTH] IN BLOOD BY AUTOMATED COUNT: 12.4 % (ref 10–15)
GLUCOSE SERPL-MCNC: 112 MG/DL (ref 70–99)
HCO3 SERPL-SCNC: 34 MMOL/L (ref 22–29)
HCT VFR BLD AUTO: 32.2 % (ref 35–47)
HGB BLD-MCNC: 10.2 G/DL (ref 11.7–15.7)
MCH RBC QN AUTO: 30.3 PG (ref 26.5–33)
MCHC RBC AUTO-ENTMCNC: 31.7 G/DL (ref 31.5–36.5)
MCV RBC AUTO: 96 FL (ref 78–100)
PLATELET # BLD AUTO: 330 10E3/UL (ref 150–450)
POTASSIUM SERPL-SCNC: 4.3 MMOL/L (ref 3.4–5.3)
RBC # BLD AUTO: 3.37 10E6/UL (ref 3.8–5.2)
SODIUM SERPL-SCNC: 142 MMOL/L (ref 135–145)
WBC # BLD AUTO: 7.4 10E3/UL (ref 4–11)

## 2025-04-04 PROCEDURE — 97535 SELF CARE MNGMENT TRAINING: CPT | Mod: GO

## 2025-04-04 PROCEDURE — 99232 SBSQ HOSP IP/OBS MODERATE 35: CPT | Performed by: INTERNAL MEDICINE

## 2025-04-04 PROCEDURE — 99232 SBSQ HOSP IP/OBS MODERATE 35: CPT | Mod: GC

## 2025-04-04 PROCEDURE — 250N000012 HC RX MED GY IP 250 OP 636 PS 637: Performed by: INTERNAL MEDICINE

## 2025-04-04 PROCEDURE — 85014 HEMATOCRIT: CPT

## 2025-04-04 PROCEDURE — 250N000011 HC RX IP 250 OP 636

## 2025-04-04 PROCEDURE — 250N000013 HC RX MED GY IP 250 OP 250 PS 637

## 2025-04-04 PROCEDURE — 80048 BASIC METABOLIC PNL TOTAL CA: CPT

## 2025-04-04 PROCEDURE — 250N000011 HC RX IP 250 OP 636: Performed by: INTERNAL MEDICINE

## 2025-04-04 PROCEDURE — 36415 COLL VENOUS BLD VENIPUNCTURE: CPT

## 2025-04-04 PROCEDURE — 120N000001 HC R&B MED SURG/OB

## 2025-04-04 PROCEDURE — 92526 ORAL FUNCTION THERAPY: CPT | Mod: GN

## 2025-04-04 RX ORDER — FUROSEMIDE 10 MG/ML
60 INJECTION INTRAMUSCULAR; INTRAVENOUS ONCE
Status: COMPLETED | OUTPATIENT
Start: 2025-04-04 | End: 2025-04-04

## 2025-04-04 RX ORDER — PREDNISONE 20 MG/1
40 TABLET ORAL DAILY
Status: DISCONTINUED | OUTPATIENT
Start: 2025-04-04 | End: 2025-04-07 | Stop reason: HOSPADM

## 2025-04-04 RX ADMIN — BUSPIRONE HYDROCHLORIDE 10 MG: 10 TABLET ORAL at 08:14

## 2025-04-04 RX ADMIN — ACETAMINOPHEN 650 MG: 325 TABLET ORAL at 14:56

## 2025-04-04 RX ADMIN — PIPERACILLIN AND TAZOBACTAM 3.38 G: 3; .375 INJECTION, POWDER, FOR SOLUTION INTRAVENOUS at 00:09

## 2025-04-04 RX ADMIN — PREDNISONE 40 MG: 20 TABLET ORAL at 14:53

## 2025-04-04 RX ADMIN — BUSPIRONE HYDROCHLORIDE 10 MG: 10 TABLET ORAL at 20:25

## 2025-04-04 RX ADMIN — GABAPENTIN 100 MG: 100 CAPSULE ORAL at 08:14

## 2025-04-04 RX ADMIN — ENOXAPARIN SODIUM 40 MG: 40 INJECTION SUBCUTANEOUS at 15:52

## 2025-04-04 RX ADMIN — SERTRALINE HYDROCHLORIDE 150 MG: 100 TABLET ORAL at 08:14

## 2025-04-04 RX ADMIN — MIRTAZAPINE 7.5 MG: 7.5 TABLET, FILM COATED ORAL at 20:25

## 2025-04-04 RX ADMIN — POLYPROPYLENE GLYCOL 400, PROPYLENE GLYCOL 2 DROP: .4; .3 LIQUID OPHTHALMIC at 08:20

## 2025-04-04 RX ADMIN — GABAPENTIN 100 MG: 100 CAPSULE ORAL at 20:24

## 2025-04-04 RX ADMIN — FUROSEMIDE 60 MG: 10 INJECTION, SOLUTION INTRAMUSCULAR; INTRAVENOUS at 12:31

## 2025-04-04 RX ADMIN — PIPERACILLIN AND TAZOBACTAM 3.38 G: 3; .375 INJECTION, POWDER, FOR SOLUTION INTRAVENOUS at 08:21

## 2025-04-04 RX ADMIN — POTASSIUM CHLORIDE 10 MEQ: 750 TABLET, EXTENDED RELEASE ORAL at 20:25

## 2025-04-04 RX ADMIN — FUROSEMIDE 60 MG: 20 TABLET ORAL at 08:14

## 2025-04-04 RX ADMIN — POTASSIUM CHLORIDE 10 MEQ: 750 TABLET, EXTENDED RELEASE ORAL at 08:14

## 2025-04-04 RX ADMIN — PIPERACILLIN AND TAZOBACTAM 3.38 G: 3; .375 INJECTION, POWDER, FOR SOLUTION INTRAVENOUS at 15:52

## 2025-04-04 RX ADMIN — METHYLPREDNISOLONE SODIUM SUCCINATE 40 MG: 40 INJECTION INTRAMUSCULAR; INTRAVENOUS at 04:54

## 2025-04-04 RX ADMIN — LOPERAMIDE HYDROCHLORIDE 2 MG: 2 CAPSULE ORAL at 13:06

## 2025-04-04 ASSESSMENT — ACTIVITIES OF DAILY LIVING (ADL)
ADLS_ACUITY_SCORE: 86
ADLS_ACUITY_SCORE: 83
ADLS_ACUITY_SCORE: 83
ADLS_ACUITY_SCORE: 91
ADLS_ACUITY_SCORE: 86
ADLS_ACUITY_SCORE: 83
ADLS_ACUITY_SCORE: 86
ADLS_ACUITY_SCORE: 91
ADLS_ACUITY_SCORE: 86
ADLS_ACUITY_SCORE: 86
ADLS_ACUITY_SCORE: 91
ADLS_ACUITY_SCORE: 91
ADLS_ACUITY_SCORE: 83
ADLS_ACUITY_SCORE: 83
ADLS_ACUITY_SCORE: 86
ADLS_ACUITY_SCORE: 83
ADLS_ACUITY_SCORE: 83
ADLS_ACUITY_SCORE: 86
ADLS_ACUITY_SCORE: 91
ADLS_ACUITY_SCORE: 86
ADLS_ACUITY_SCORE: 86

## 2025-04-04 NOTE — PROGRESS NOTES
Pulmonary/Critical Care Consult Team Note    Maranda Downing,  1935, MRN [id number]  Admitting Dx: Hypoxia [R09.02]  ILD (interstitial lung disease) (H) [J84.9]  Date / Time of Admission:  2025 10:12 AM    She is getting ready to leave the hospital  Brother at bedside  I explained the below plan and her follow up and they both understand     Assessment/Plan: Maranda Downing is a 89 year old female with PMHx of UTI, HFpEF, RBBB, CAD, DM2, Fatty Liver, HTN, HLD, IBS, GERD, who presented with acute resp failure with hypoxia due to chronic aspiration.    Acute resp failure with hypoxia due to chronic aspiration.  - SLP following  - Zosyn - finish course of PO Abx  - Steroids for the pneumonitis - Changed to PO 40mg prednisone for 7 days  - we discussed that she will need follow up in our pulmonary clinic with CT non-contrast Chest and PFTs in about 4 weeks - I placed the order and alerted the clinic  - home O2 eval    Will sign off, please let our service know if any change in clinical condition or questions.      Medical Care Time excluding procedures and family discussions greater than: 45 Minutes    Risk Factors Present on Admission:  Clinically Significant Risk Factors                   # Hypertension: Noted on problem list  # Acute heart failure with preserved ejection fraction: heart failure noted on problem list, last echo with EF >50%, and receiving IV diuretics               # Financial/Environmental Concerns:          Code Status: No CPR- Do NOT Intubate         Gisela Paniagua, DO  Pulmonary and Critical Care Attending  pgr [phone]    Allergies   Allergen Reactions    Codeine Nausea    Hydrocodone      Other reaction(s): GI Upset  nausea    Iodinated Contrast Media     Levofloxacin      Other reaction(s): Intolerance-Can't Take  Made leg pain worse.    Lisinopril Cough    Morphine Nausea and Vomiting     Other reaction(s): Nausea/Vomiting    Sulfa Antibiotics Nausea    Penicillins Rash      "Tolerated zosyn 4/2/2025       Meds: See MAR    Physical Exam:  /70 (BP Location: Left arm)   Pulse 99   Temp 98.6  F (37  C) (Oral)   Resp 18   Ht 1.6 m (5' 3\")   Wt 58 kg (127 lb 13.9 oz)   SpO2 94%   BMI 22.65 kg/m    Intake/Output this shift:  I/O this shift:  In: 360 [P.O.:360]  Out: -   GEN: sitting up in bed, NAD  HEENT: MMM, NC in place  CVS: regular rhythm, no murmurs  RESP: bilateral rhonchi, no wheezing  ABD: Soft, No abdominal pain with palpation, no guarding, no rigidity  EXT: Warm, well perfused, no LE edema   NEURO:  no focal deficits  PSYCH: pleasant    Pertinent Labs: Latest lab results in EHR personally reviewed.   CMP  Recent Labs   Lab 04/04/25  0500 04/03/25  1125 04/03/25  0701 04/02/25  1116 03/31/25  1135     --  141 143 141   POTASSIUM 4.3  --  4.3 4.4 4.3   CHLORIDE 100  --  98 98 101   CO2 34*  --  31* 35* 28   ANIONGAP 8  --  12 10 12   * 129* 114* 116* 113*   BUN 32.6*  --  24.1* 17.7 21.3   CR 1.09*  --  0.95 0.89 0.80   GFRESTIMATED 48*  --  57* 61 70   HETAL 8.8  --  9.0 9.2 9.2     CBC  Recent Labs   Lab 04/04/25  0500 04/03/25  0701 04/02/25  1116 03/31/25  1135   WBC 7.4 5.9 7.9 9.1   RBC 3.37* 3.65* 3.80 3.61*   HGB 10.2* 11.0* 11.6* 11.1*   HCT 32.2* 34.8* 35.9 35.9   MCV 96 95 95 99   MCH 30.3 30.1 30.5 30.7   MCHC 31.7 31.6 32.3 30.9*   RDW 12.4 12.2 12.6 12.6    316 319 347     INRNo lab results found in last 7 days.  Arterial Blood Gas  Recent Labs   Lab 04/02/25  1116   O2PER 40       Cultures: not yet available.      Imaging: personally reviewed.     EXAM: CT CHEST W/O CONTRAST  LOCATION: [facility]  DATE: 4/2/2025     INDICATION: Worsening hypoxia and dyspnea  COMPARISON: Chest x-ray 3/26/2025, CT chest 20 2022 and older studies  TECHNIQUE: CT chest without IV contrast. Multiplanar reformats were obtained. Dose reduction techniques were used.  CONTRAST: None.     FINDINGS:   LUNGS AND PLEURA: Extensive, heterogeneous " groundglass opacities are noted bilaterally, left greater than right with areas of segmental sparing posteriorly in the right upper and middle lobes and anteriorly in the right lower lobe. No intralobular septal   thickening. Changes are superimposed over chronic areas of subpleural reticulation, traction bronchiectasis and a few subpleural cysts in the left upper lobe anteriorly. There is a very small left pleural effusion.     MEDIASTINUM/AXILLAE: Less than 1 cm right thyroid nodule is unchanged. Main pulmonary artery is enlarged at 3.8 cm. Extensive calcification aorta and branches. No aneurysm. Extensive mitral annular calcifications.     CORONARY ARTERY CALCIFICATION: Mild.     UPPER ABDOMEN: Quite distended gallbladder with large gallstone in the neck and hyperdense bile and a few layering smaller stones dependently. Appearance is unchanged and there are no inflammatory changes about the gallbladder. Scattered colonic   diverticulosis. Left renal cyst again noted. Aorta and branches are heavily calcified.     MUSCULOSKELETAL: Patient's developed new vertebral body compression fractures since 2022. There is a new vertebral plana at L1, compression of the superior endplate of L3 with sclerosis and 50% loss of vertebral body height. Compression fractures at T12   and T10 are unchanged.                                                                      IMPRESSION:   1.  Patient has developed heterogeneous, diffuse groundglass opacities without intralobular septal thickening, left greater than right. Changes are superimposed over underlying pulmonary fibrosis, non-UIP pattern as noted above. Pattern suggests acute   pneumonitis. Consider pulmonary consultation.  2.  Trace left effusion without  intralobular septal thickening to suggest failure.  3.  Enlarged main pulmonary artery consistent with pulmonary arterial hypertension.  4.  Compression fractures at L1 and L3, new since 2022.  5.  Cholelithiasis with very  large gallstone in the gallbladder neck and no signs of acute inflammation, unchanged.  6.  Other noncritical findings as noted above.    Results for orders placed during the hospital encounter of 03/24/25    XR Chest Port 1 View    Narrative  EXAM: XR CHEST PORT 1 VIEW  LOCATION: [facility]  DATE: 3/26/2025    INDICATION: Difficulty weaning oxygen, evaluate for new changes  COMPARISON: X-ray 3/24/2025    Impression  IMPRESSION: Persistent low lung volumes. Stable central vascular congestion. Diffuse bilateral multilobar reticulation with superimposed patchy opacities. Slightly improved aeration within the left upper lobe and left midlung. Otherwise, no significant  change since the prior exam. No definite pleural effusion. Stable heart size. Heavily calcified thoracic aorta.      Patient Active Problem List   Diagnosis    Type 2 diabetes mellitus with diabetic chronic kidney disease, unspecified CKD stage, unspecified whether long term insulin use (H)    Chronic diarrhea    Anxiety state    Allergic rhinitis    AK (actinic keratosis)    Acquired absence of other left toe(s)    Abdominal pain    Coronary artery disease of native artery of native heart with stable angina pectoris    Cystitis cystica    Constipation    Dysesthesia    Fall    Fatty liver    Fibromyalgia    Gastroesophageal reflux disease without esophagitis    Genital herpes    Hammertoe of left foot    Hypercalcemia    Hypercholesteremia    Hypertension    Hypokalemia    Hypoxia    Osteoporosis    OA (osteoarthritis) of knee    Nausea & vomiting    Mild cognitive impairment    Midline thoracic back pain    Irritable bowel syndrome    Overflow diarrhea    Vitamin B 12 deficiency    Stage 3 chronic kidney disease, unspecified whether stage 3a or 3b CKD (H)    Sensorineural hearing loss, bilateral    Rosacea    Right bundle branch block    Pulmonary hypertension (H)    PPD positive    Paroxysmal atrial tachycardia     Noninfectious gastroenteritis    Elevated troponin    Diverticular disease    Acute on chronic diastolic congestive heart failure (H)    Urinary retention    Acute cystitis without hematuria    History of aspiration pneumonia    Paroxysmal atrial fibrillation (H)    Acute and chronic respiratory failure with hypoxia (H)    Failure to attend appointment    Pneumonia of left lower lobe due to other aerobic gram-negative bacteria (H)    Sepsis due to undetermined organism (H)    Fresh blood passed per rectum    Bronchiectasis with acute lower respiratory infection (H)    Abnormal CT scan of lung    Meconium aspiration pneumonia, unspecified laterality, unspecified part of lung    Dysphagia, unspecified type    Acute pulmonary embolism, unspecified pulmonary embolism type, unspecified whether acute cor pulmonale present (H)    Gastroesophageal reflux disease with esophagitis without hemorrhage    Acute deep vein thrombosis (DVT) of left lower extremity, unspecified vein (H)    Acute respiratory failure with hypoxia (H)    Congestive heart failure, unspecified HF chronicity, unspecified heart failure type (H)    Acute cystitis with hematuria    ILD (interstitial lung disease) (H)     Gisela Paniagua DO  Pulmonary and Critical Care Attending  pgr [phone]    Securely message with the [employer]oc[employer] Web Console (learn more here)

## 2025-04-04 NOTE — CONSULTS
Care Management Follow Up    Length of Stay (days): 2    Expected Discharge Date: 04/05/2025    Anticipated Discharge Plan: Transitional Care Facility      Transportation: Anticipate Family/friend    PT Recommendations: Transitional Care Facility  OT Recommendations:  Transitional Care Facility     Barriers to Discharge: medical stability, placement    Prior Living Situation: apartment, assisted living with alone    Discussed  Partnership in Safe Discharge Planning  document with patient/family: No     Handoff Completed: No, handoff not indicated or clinically appropriate    Patient/Spokesperson Updated: Yes. Who? Patient and daughter Carmella    Additional Information:  12:45 PM  REKHA attempted to meet with Pt to discuss therapy recommendations for TCU. Pt requested that CM stop by later as she was hoping to finish watching her TV show. Pt gave CM permission to call her daughter Carmella to discuss discharge planning. SW left a TCU list with Pt on her tray table.    REKHA received a phone call from Pt's Satinder Arboleda who called for hospitalization updates. REKHA updated Nkechi that therapy is recommending TCU at this time. Nkechi reported she is available if CM needs any assistance with discharge planning.     REKHA called and spoke to Pt's daughter Carmella about recommendations for TCU. Carmella reported that Pt is more agreeable with TCU placement this admission and that she will review the TCU list with Pt when she visits this afternoon.     3:27 PM  [facility] declined referral due to acuity. REKHA met with Pt to discuss TCU. Pt reported that she would only want to go to [facility] but was agreeable to consider a couple other options if CM called her daughter to revisit the discussion. REKHA first called and spoke with Glenda at [facility] who reported that she would reconsider Pt's referral if CM followed up with her again on Monday (4/7). REKHA called and updated Carmella who agreed to  look at the TCU list to choose a couple more options for CM to send referrals to just in case [facility] still declines.     Contacts:  Carmella, daughter: [phone]  Tiffani Kymberly at [facility])  Phone: [phone]  JAROD Arboleda Complex Case Manager - [facility]: [phone]  [facility]: [phone]    Next Steps: CM to follow up with Pt and family for additional TCU choices. CM to follow up with [facility] on Monday (4/7) to have them reconsider Pt's TCU referral.        MAYKEL Beal

## 2025-04-04 NOTE — PLAN OF CARE
Problem: Adult Inpatient Plan of Care  Goal: Absence of Hospital-Acquired Illness or Injury  Intervention: Identify and Manage Fall Risk  Recent Flowsheet Documentation  Taken 4/3/2025 1545 by Svetlana Reilly RN  Safety Promotion/Fall Prevention: safety round/check completed  Intervention: Prevent Skin Injury  Recent Flowsheet Documentation  Taken 4/3/2025 1545 by Svetlana Reilly RN  Skin Protection: silicone foam dressing in place  Intervention: Prevent Infection  Recent Flowsheet Documentation  Taken 4/3/2025 1545 by Svetlana Reilly RN  Infection Prevention:   hand hygiene promoted   rest/sleep promoted  Goal: Optimal Comfort and Wellbeing  Intervention: Provide Person-Centered Care  Recent Flowsheet Documentation  Taken 4/3/2025 1545 by Svetlana Reilly RN  Trust Relationship/Rapport: care explained  Goal: Readiness for Transition of Care  Intervention: Mutually Develop Transition Plan  Recent Flowsheet Documentation  Taken 4/3/2025 1700 by Svetlana Reilly RN  Equipment Currently Used at Home: walker, standard     Problem: Delirium  Goal: Improved Behavioral Control  Intervention: Minimize Safety Risk  Recent Flowsheet Documentation  Taken 4/3/2025 1545 by Svetlana Reilly RN  Enhanced Safety Measures: review medications for side effects with activity  Trust Relationship/Rapport: care explained  Goal: Improved Attention and Thought Clarity  Intervention: Maximize Cognitive Function  Recent Flowsheet Documentation  Taken 4/3/2025 1545 by Svetlana Reilly RN  Sensory Stimulation Regulation:   care clustered   television on  Reorientation Measures: clock in view  Goal: Improved Sleep  Intervention: Promote Sleep  Recent Flowsheet Documentation  Taken 4/3/2025 1545 by Svetlana Reilly RN  Sleep/Rest Enhancement: awakenings minimized   Goal Outcome Evaluation:  Pt reports weakness more than usual but at baseline she is dependent for ADLs and mobility.  Pt tolerated diet and no straw.  Pt has excoriated sacrum/coccyx.  Cleaned with Vashe  and covered with Mepilex.

## 2025-04-04 NOTE — PLAN OF CARE
Pt is A&O to self and situation, disoriented to time and place, calm, cooperative  - vitals stable on 4L O2 nasal cannula  - IV antibiotics given overnight  - meplex in place on sacrum/coccyx   - denies pain  - uneventful shift, pt slept well    ----------------------------------------------------------------------------------------------    Goal Outcome Evaluation:      Plan of Care Reviewed With: patient    Overall Patient Progress: improving      Problem: Adult Inpatient Plan of Care  Goal: Absence of Hospital-Acquired Illness or Injury  Intervention: Identify and Manage Fall Risk  Recent Flowsheet Documentation  Taken 4/4/2025 0000 by Radha Hargrove RN  Safety Promotion/Fall Prevention:   safety round/check completed   clutter free environment maintained   patient and family education   assistive device/personal items within reach     Problem: Delirium  Goal: Optimal Coping  Outcome: Progressing  Goal: Improved Behavioral Control  Outcome: Progressing  Intervention: Minimize Safety Risk  Recent Flowsheet Documentation  Taken 4/4/2025 0000 by Radha Hargrove RN  Enhanced Safety Measures: review medications for side effects with activity  Trust Relationship/Rapport: care explained  Goal: Improved Attention and Thought Clarity  Outcome: Progressing  Intervention: Maximize Cognitive Function  Recent Flowsheet Documentation  Taken 4/4/2025 0000 by Radha Hargrove RN  Sensory Stimulation Regulation:   care clustered   television on  Reorientation Measures: clock in view  Goal: Improved Sleep  Outcome: Progressing  Intervention: Promote Sleep  Recent Flowsheet Documentation  Taken 4/4/2025 0000 by Radha Hargrove, RN  Sleep/Rest Enhancement: awakenings minimized

## 2025-04-04 NOTE — PROGRESS NOTES
[facility]    Progress Note - Hospitalist Service       Date of Admission:  4/2/2025    Assessment & Plan   Maranda Downing is a 89 year old female admitted on 4/2/2025. She has a history of recurrent UTI, HFpEF, RBBB, CAD, DM2, Fatty Liver, HTN, HLD, IBS, GERD, Thyroid Nodule who is admitted for worsening shortness of breath and hypoxia with likely underlying chronic lung disease.     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 Discharged on 2L NC a week ago. Now worsening for 4 days on 4-5L NC on admission with nonproductive cough, CT on admission with worsening groundglass opacities and fibrosis, along with pneumonitis. Pulmonary HTN seen as well, echocardiogram 2021 with LVEF 55-60%. Rales diffusely on lung exam though related to likely lung disease.  Patient and daughter do report more coughing when eating and drinking lately, possible contribution of aspiration contributing though passed video swallow study. Otherwise has been vitally normal, no leukocytosis, procal normal reassuring against new acute infection. VBG with compensated respiratory acidosis.   - Pulmonology consulted, appreciate recs   - Zosyn   - Methylprednisolone  - SLP    - Passed initial swallow study and video swallow study  - Oxygen as needed, wean as able   - PT/OT   - TCU recommended  - Care management for dispo planning  - Delirium precautions     HFpEF  Paroxysmal Atrial Fibrillation  Elevated troponin  Afib noted on chart review, rate controlled on admission and not on meds. Not on anticoagulation with history of recurrent bleeding. Troponin 27 -> 23 on admission. BNP 2,500 on admission, similar to 1 week ago. Echocardiogram 2021 with LVEF 55-60%. Otherwise does not appear significantly hypervolemic on exam.  - Echo updated    -  LVEF 55-60% with diastolic dysfunction   - Moderate aortic and mitral regurgitation   - Elevated right ventricular systolic pressure  - PTA Kcl capsules 10 mEq BID  - PTA lasix 60 mg PO daily  - Spot dose of lasix 60 mg IV 4/4     Intermittent Vomiting  Patient notes she does sometimes vomit in AM, has not been a problem as much recently. Not nausea now. Could contribute to aspiration  - Will continue to monitor     Chronic T10, T12, L1, L3 Fractures  Noted on CXR, known to patient, stable.     T2DM  Not on PTA meds, multiple A1cs in last year under 6, most recent 9 months ago 5.5. Glucose on admission 116.   - Will monitor daily BMP, consider sliding scale insulin if needed or steroids given     Sacral Wound  - Noted on admission, mildly tender. Vitals and labs reassuring against systemic infection  - WOC consulted     Chronic conditions:  Depression/Anxiety - PTA sertraline, buspar, mirtazapine  Chronic diarrhea - PTA PRN loperamide  HTN, HLD, GERD, CAD - not on PTA meds          Diet: Combination Diet Easy to Chew (level 7); Thin Liquids (level 0)    DVT Prophylaxis: Enoxaparin (Lovenox) SQ  Driver Catheter: Not present  Fluids: NPO  Lines: None     Cardiac Monitoring: None  Code Status: No CPR- Do NOT Intubate      Clinically Significant Risk Factors                   # Hypertension: Noted on problem list  # Chronic heart failure with preserved ejection fraction: heart failure noted on problem list and last echo with EF >50%               # Financial/Environmental Concerns:           Social Drivers of Health    [facility] [employer] [facility]    Social Connections         Disposition Plan     Medically Ready for Discharge: Anticipated in 2-4 Days         The patient's care was discussed with the Attending Physician, Dr. Torres .    Donald Calderon MD  Hospitalist Service  [facility]  Securely message with [employer] (more info)  Text page via [employer] Paging/Directory    ______________________________________________________________________    Interval History   NAEO. Feeling a little better but still short of breath. Otherwise no new concerns. Not very hungry. Discussed recommendations for TCU, patient is open to exploring.    Physical Exam   Vital Signs: Temp: 98.6  F (37  C) Temp src: Oral BP: 127/70 Pulse: 99   Resp: 18 SpO2: 94 % O2 Device: Nasal cannula Oxygen Delivery: 2 LPM  Weight: 127 lbs 13.87 oz    Constitutional: awake, alert, cooperative, no apparent distress  Eyes: Extra ocular muscles intact, sclera clear, conjunctiva normal  Respiratory: Fine crackles diffusely in bilateral lung fields. Fair air movement throughout. No increased work of breathing, no wheezing  Cardiovascular: Irregularly irregular. 2/6 systolic murmur loudest RUSB/LUSB. Normal S1 and S2, no S3 or S4  GI: Normal bowel sounds, soft, non-distended, no masses palpated, no hepatosplenomegaly  Skin: No other rashes or lesions visualized on exposed skin. 1+ edema in BLE  Musculoskeletal: There is no redness, warmth, or swelling of the joints. Moves all four extremities spontaneously.  Neurologic: Cranial nerves II-XII are grossly intact.  Neuropsychiatric: General: normal, calm, and normal eye contact  Level of consciousness: alert / normal  Affect: normal and pleasant    Medical Decision Making       Please see A&P for additional details of medical decision making.      Data     I have personally reviewed the following data over the past 24 hrs:    7.4  \   10.2 (L)   / 330     142 100 32.6 (H) /  112 (H)   4.3 34 (H) 1.09 (H) \       Imaging results reviewed over the past 24 hrs:   Recent Results (from the past 24 hours)   XR Video Swallow with SLP or OT    Narrative    EXAM: XR VIDEO SWALLOW WITH SLP OR OT  LOCATION: [facility]  DATE: 4/3/2025    INDICATION: Difficulty swallowing.  COMPARISON: None.    TECHNIQUE: Routine swallow study with speech pathology using multiple  barium thicknesses.    RADIATION DOSE: Dose Area Product 77.23 microGy-m2    FINDINGS:   Swallow study with Speech Pathology using multiple barium thicknesses.     There are variable amounts of penetration with thin barium. This does not always clear and slowly dropped to the level of the cords during the study which was symptomatic with a cough.    Patient also does not swallow everything she takes into the oral cavity at once. The subsequent swallow of the retained component is much more discoordinated than the initial swallow and one episode of deep penetration this occurred.    No appreciable difference with a chin tuck.    No significant stasis in the piriform sinuses or vallecula.    Please see speech pathology note

## 2025-04-04 NOTE — PLAN OF CARE
Problem: Adult Inpatient Plan of Care  Goal: Absence of Hospital-Acquired Illness or Injury  Intervention: Identify and Manage Fall Risk  Recent Flowsheet Documentation  Taken 4/4/2025 0814 by Gaviria, Sydnie, RN  Safety Promotion/Fall Prevention:   safety round/check completed   clutter free environment maintained   patient and family education   assistive device/personal items within reach  Intervention: Prevent Skin Injury  Recent Flowsheet Documentation  Taken 4/4/2025 1358 by Gaviria, Sydnie, RN  Body Position:   supine, head elevated   heels elevated   foot of bed elevated  Taken 4/4/2025 0814 by Gaviria, Sydnie, RN  Body Position:   left   turned   right   foot of bed elevated   heels elevated  Skin Protection: silicone foam dressing in place  Intervention: Prevent Infection  Recent Flowsheet Documentation  Taken 4/4/2025 0814 by Sydnie Gaviria RN  Infection Prevention:   hand hygiene promoted   rest/sleep promoted  Goal: Optimal Comfort and Wellbeing  Intervention: Provide Person-Centered Care  Recent Flowsheet Documentation  Taken 4/4/2025 0814 by Sydnie Gaviria RN  Trust Relationship/Rapport: care explained     Problem: Delirium  Goal: Improved Behavioral Control  Intervention: Minimize Safety Risk  Recent Flowsheet Documentation  Taken 4/4/2025 0814 by Sydnie Gaviria RN  Enhanced Safety Measures: review medications for side effects with activity  Trust Relationship/Rapport: care explained  Goal: Improved Attention and Thought Clarity  Intervention: Maximize Cognitive Function  Recent Flowsheet Documentation  Taken 4/4/2025 0814 by Sydnie Gaviria RN  Sensory Stimulation Regulation:   care clustered   television on  Reorientation Measures: clock in view     Problem: Gas Exchange Impaired  Goal: Optimal Gas Exchange  Intervention: Optimize Oxygenation and Ventilation  Recent Flowsheet Documentation  Taken 4/4/2025 1358 by Sydnie Gaviria RN  Head of Bed (HOB)  Positioning: HOB at 20-30 degrees  Taken 4/4/2025 0814 by Sydnie Gaviria RN  Head of Bed (HOB) Positioning: HOB at 20-30 degrees     Problem: Comorbidity Management  Goal: Blood Glucose Levels Within Targeted Range  Intervention: Monitor and Manage Glycemia  Recent Flowsheet Documentation  Taken 4/4/2025 0814 by Sydnie Gaviria RN  Medication Review/Management: medications reviewed  Goal: Maintenance of Heart Failure Symptom Control  Intervention: Maintain Heart Failure Management  Recent Flowsheet Documentation  Taken 4/4/2025 0814 by Sydnie Gaviria RN  Medication Review/Management: medications reviewed  Goal: Blood Pressure in Desired Range  Intervention: Maintain Blood Pressure Management  Recent Flowsheet Documentation  Taken 4/4/2025 0814 by Sydnie Gaviria RN  Medication Review/Management: medications reviewed  Goal: Maintenance of Behavioral Health Symptom Control  Intervention: Maintain Behavioral Health Symptom Control  Recent Flowsheet Documentation  Taken 4/4/2025 0814 by Sydnie Gaviria RN  Medication Review/Management: medications reviewed   Goal Outcome Evaluation:  Patient alert and oriented to self, place and situation. Moving with assist of 2 using gait belt. Up in chair during the day. Vitals stable on 2 L oxygen via nasal cannula. Easy to chew diet with thin liquids. IV antibiotics. Saline locked. IV steroids. SOB with exertion. Loose stools, PRN imodium given. Mepilex dressing on coccyx changed. Redness noted on heels, mepilex applied for protection. Endorses some pain in coccyx, PRN tylenol given. Brother present for support in afternoon.

## 2025-04-05 ENCOUNTER — APPOINTMENT (OUTPATIENT)
Dept: SPEECH THERAPY | Facility: HOSPITAL | Age: OVER 89
DRG: 196 | End: 2025-04-05
Payer: MEDICARE

## 2025-04-05 LAB
ANION GAP SERPL CALCULATED.3IONS-SCNC: 9 MMOL/L (ref 7–15)
BUN SERPL-MCNC: 35.6 MG/DL (ref 8–23)
CALCIUM SERPL-MCNC: 9.2 MG/DL (ref 8.8–10.4)
CHLORIDE SERPL-SCNC: 98 MMOL/L (ref 98–107)
CREAT SERPL-MCNC: 1.13 MG/DL (ref 0.51–0.95)
EGFRCR SERPLBLD CKD-EPI 2021: 46 ML/MIN/1.73M2
ERYTHROCYTE [DISTWIDTH] IN BLOOD BY AUTOMATED COUNT: 12.2 % (ref 10–15)
GLUCOSE SERPL-MCNC: 117 MG/DL (ref 70–99)
HCO3 SERPL-SCNC: 35 MMOL/L (ref 22–29)
HCT VFR BLD AUTO: 34.2 % (ref 35–47)
HGB BLD-MCNC: 10.9 G/DL (ref 11.7–15.7)
MCH RBC QN AUTO: 30.2 PG (ref 26.5–33)
MCHC RBC AUTO-ENTMCNC: 31.9 G/DL (ref 31.5–36.5)
MCV RBC AUTO: 95 FL (ref 78–100)
PLATELET # BLD AUTO: 298 10E3/UL (ref 150–450)
POTASSIUM SERPL-SCNC: 3.9 MMOL/L (ref 3.4–5.3)
RBC # BLD AUTO: 3.61 10E6/UL (ref 3.8–5.2)
SODIUM SERPL-SCNC: 142 MMOL/L (ref 135–145)
WBC # BLD AUTO: 7.2 10E3/UL (ref 4–11)

## 2025-04-05 PROCEDURE — 250N000013 HC RX MED GY IP 250 OP 250 PS 637

## 2025-04-05 PROCEDURE — 85014 HEMATOCRIT: CPT

## 2025-04-05 PROCEDURE — 36415 COLL VENOUS BLD VENIPUNCTURE: CPT

## 2025-04-05 PROCEDURE — 120N000001 HC R&B MED SURG/OB

## 2025-04-05 PROCEDURE — 92526 ORAL FUNCTION THERAPY: CPT | Mod: GN | Performed by: SPEECH-LANGUAGE PATHOLOGIST

## 2025-04-05 PROCEDURE — 82374 ASSAY BLOOD CARBON DIOXIDE: CPT

## 2025-04-05 PROCEDURE — 82435 ASSAY OF BLOOD CHLORIDE: CPT

## 2025-04-05 PROCEDURE — 250N000012 HC RX MED GY IP 250 OP 636 PS 637: Performed by: INTERNAL MEDICINE

## 2025-04-05 PROCEDURE — 250N000011 HC RX IP 250 OP 636

## 2025-04-05 PROCEDURE — 80048 BASIC METABOLIC PNL TOTAL CA: CPT

## 2025-04-05 PROCEDURE — 250N000011 HC RX IP 250 OP 636: Performed by: INTERNAL MEDICINE

## 2025-04-05 RX ORDER — NYSTATIN 100000 U/G
CREAM TOPICAL 2 TIMES DAILY
Status: DISCONTINUED | OUTPATIENT
Start: 2025-04-05 | End: 2025-04-07 | Stop reason: HOSPADM

## 2025-04-05 RX ADMIN — FUROSEMIDE 60 MG: 20 TABLET ORAL at 09:08

## 2025-04-05 RX ADMIN — MIRTAZAPINE 7.5 MG: 7.5 TABLET, FILM COATED ORAL at 21:17

## 2025-04-05 RX ADMIN — NYSTATIN: 100000 CREAM TOPICAL at 18:15

## 2025-04-05 RX ADMIN — PIPERACILLIN AND TAZOBACTAM 3.38 G: 3; .375 INJECTION, POWDER, FOR SOLUTION INTRAVENOUS at 09:10

## 2025-04-05 RX ADMIN — POLYPROPYLENE GLYCOL 400, PROPYLENE GLYCOL 2 DROP: .4; .3 LIQUID OPHTHALMIC at 19:58

## 2025-04-05 RX ADMIN — LOPERAMIDE HYDROCHLORIDE 2 MG: 2 CAPSULE ORAL at 09:28

## 2025-04-05 RX ADMIN — POLYPROPYLENE GLYCOL 400, PROPYLENE GLYCOL 2 DROP: .4; .3 LIQUID OPHTHALMIC at 09:09

## 2025-04-05 RX ADMIN — PREDNISONE 40 MG: 20 TABLET ORAL at 09:08

## 2025-04-05 RX ADMIN — PIPERACILLIN AND TAZOBACTAM 3.38 G: 3; .375 INJECTION, POWDER, FOR SOLUTION INTRAVENOUS at 23:59

## 2025-04-05 RX ADMIN — GABAPENTIN 100 MG: 100 CAPSULE ORAL at 09:08

## 2025-04-05 RX ADMIN — BUSPIRONE HYDROCHLORIDE 10 MG: 10 TABLET ORAL at 19:56

## 2025-04-05 RX ADMIN — PIPERACILLIN AND TAZOBACTAM 3.38 G: 3; .375 INJECTION, POWDER, FOR SOLUTION INTRAVENOUS at 00:20

## 2025-04-05 RX ADMIN — LOPERAMIDE HYDROCHLORIDE 2 MG: 2 CAPSULE ORAL at 21:17

## 2025-04-05 RX ADMIN — PIPERACILLIN AND TAZOBACTAM 3.38 G: 3; .375 INJECTION, POWDER, FOR SOLUTION INTRAVENOUS at 17:54

## 2025-04-05 RX ADMIN — POTASSIUM CHLORIDE 10 MEQ: 750 TABLET, EXTENDED RELEASE ORAL at 09:08

## 2025-04-05 RX ADMIN — GABAPENTIN 100 MG: 100 CAPSULE ORAL at 19:52

## 2025-04-05 RX ADMIN — SERTRALINE HYDROCHLORIDE 150 MG: 100 TABLET ORAL at 09:08

## 2025-04-05 RX ADMIN — LOPERAMIDE HYDROCHLORIDE 2 MG: 2 CAPSULE ORAL at 12:51

## 2025-04-05 RX ADMIN — NYSTATIN: 100000 CREAM TOPICAL at 21:17

## 2025-04-05 RX ADMIN — POTASSIUM CHLORIDE 10 MEQ: 750 TABLET, EXTENDED RELEASE ORAL at 19:52

## 2025-04-05 RX ADMIN — ENOXAPARIN SODIUM 40 MG: 40 INJECTION SUBCUTANEOUS at 17:53

## 2025-04-05 RX ADMIN — BUSPIRONE HYDROCHLORIDE 10 MG: 10 TABLET ORAL at 09:08

## 2025-04-05 ASSESSMENT — ACTIVITIES OF DAILY LIVING (ADL)
ADLS_ACUITY_SCORE: 95
ADLS_ACUITY_SCORE: 91
ADLS_ACUITY_SCORE: 93
ADLS_ACUITY_SCORE: 99
ADLS_ACUITY_SCORE: 91
ADLS_ACUITY_SCORE: 95
ADLS_ACUITY_SCORE: 95
ADLS_ACUITY_SCORE: 91
ADLS_ACUITY_SCORE: 89
ADLS_ACUITY_SCORE: 91
ADLS_ACUITY_SCORE: 89
ADLS_ACUITY_SCORE: 93
ADLS_ACUITY_SCORE: 91
ADLS_ACUITY_SCORE: 95
ADLS_ACUITY_SCORE: 99
ADLS_ACUITY_SCORE: 99
ADLS_ACUITY_SCORE: 93
ADLS_ACUITY_SCORE: 88
ADLS_ACUITY_SCORE: 89
ADLS_ACUITY_SCORE: 95
ADLS_ACUITY_SCORE: 91

## 2025-04-05 NOTE — PROGRESS NOTES
Paged family Resident : Pt has left breast red and small crack; sweaty, can she get something. Also refused to use virginal cream last night will try today. Family says does not use all the time.

## 2025-04-05 NOTE — PLAN OF CARE
Problem: Adult Inpatient Plan of Care  Goal: Absence of Hospital-Acquired Illness or Injury  Outcome: Progressing  Intervention: Identify and Manage Fall Risk  Recent Flowsheet Documentation  Taken 4/5/2025 0018 by Maranda Quiles RN  Safety Promotion/Fall Prevention:   activity supervised   assistive device/personal items within reach   mobility aid in reach   nonskid shoes/slippers when out of bed   safety round/check completed   lighting adjusted   clutter free environment maintained  Intervention: Prevent Skin Injury  Recent Flowsheet Documentation  Taken 4/5/2025 0451 by Maranda Quiles RN  Body Position:   turned   left   heels elevated  Taken 4/5/2025 0251 by Maranda Quiles RN  Body Position:   turned   right   heels elevated  Intervention: Prevent Infection  Recent Flowsheet Documentation  Taken 4/5/2025 0018 by Maranda Quiles RN  Infection Prevention:   hand hygiene promoted   rest/sleep promoted   single patient room provided  Goal: Optimal Comfort and Wellbeing  Outcome: Progressing     Problem: Delirium  Goal: Improved Sleep  Outcome: Progressing     Problem: Gas Exchange Impaired  Goal: Optimal Gas Exchange  Outcome: Progressing  Intervention: Optimize Oxygenation and Ventilation  Recent Flowsheet Documentation  Taken 4/5/2025 0451 by Maranda Quiles RN  Head of Bed ([facility]) Positioning: HOB at 20-30 degrees  Taken 4/5/2025 0251 by Maranda Quiles RN  Head of Bed ([facility]) Positioning: HOB at 20-30 degrees     Problem: Wound  Goal: Skin Health and Integrity  Outcome: Progressing  Intervention: Optimize Skin Protection  Recent Flowsheet Documentation  Taken 4/5/2025 0451 by Maranda Quiles RN  Head of Bed ([facility]) Positioning: HOB at 20-30 degrees  Taken 4/5/2025 0251 by Maranda Quiles RN  Head of Bed ([facility]) Positioning: HOB at 20-30 degrees  Taken 4/5/2025 0018 by Maranda Quiles RN  Activity Management:   activity adjusted per tolerance   activity encouraged     Problem: Diarrhea  Goal: Effective Diarrhea Management  Outcome: Progressing  Intervention:  Manage Diarrhea  Recent Flowsheet Documentation  Taken 4/5/2025 0018 by Maranda Quiles, RN  Medication Review/Management: medications reviewed     Problem: Pain Acute  Goal: Optimal Pain Control and Function  Outcome: Progressing  Intervention: Prevent or Manage Pain  Recent Flowsheet Documentation  Taken 4/5/2025 0018 by Maranda Quiles, RN  Medication Review/Management: medications reviewed     Goal Outcome Evaluation:       Pt is A&Ox3-4 w/ intermittent forgetfulness & confusion. Pt denies pain & SOB at rest. No loose BM overnight. Purewick in use. Coccyx mepliex C/D/I. On 2L NC. Pt slept in between cares.

## 2025-04-05 NOTE — PROGRESS NOTES
DR. Elie Hoang ordered WOC:  Elie Hoang - 3:57 pm  Dry cracked skin? Does it look candidial? Does she just want barrier cream or something for pain?  RS  Read - 4:00 pm  Wet skin, no pain looks yeast like, also coccyx is small open we are using Mapilex no WOC has seen her.  AF  Elie Hoang - 4:01 pm  WOC is consulted and gave some recs a few days ago, didn't comment on the chest. I ordered a topical antifungal for the breast  RS

## 2025-04-05 NOTE — PROGRESS NOTES
[facility]    Medicine Progress Note - Hospitalist Service    Date of Admission:  4/2/2025    Assessment & Plan   Maranda Downing is a 89 year old female admitted on 4/2/2025. She has a history of recurrent UTI, HFpEF, RBBB, CAD, DM2, Fatty Liver, HTN, HLD, IBS, GERD, Thyroid Nodule who is admitted for worsening shortness of breath and hypoxia with likely underlying chronic lung disease.     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 Discharged on 2L NC a week ago. Now worsening for 4 days on 4-5L NC on admission with nonproductive cough, CT on admission with worsening groundglass opacities and fibrosis, along with pneumonitis. Pulmonary HTN seen as well, echocardiogram 2021 with LVEF 55-60%. Rales diffusely on lung exam though related to likely lung disease.  Patient and daughter do report more coughing when eating and drinking lately, possible contribution of aspiration contributing though passed video swallow study. Otherwise has been vitally normal, no leukocytosis, procal normal reassuring against new acute infection. VBG with compensated respiratory acidosis. 4/5 continues to require 2-4L O2 to maintain O2 sats.   - Complete abx course w/ IV zosyn given only 1 day left in course and listed allergies to penicillins and flouroquinolones  - Pulmonology consulted, appreciate recs   > Steroids for the pneumonitis - Changed to PO 40mg prednisone for 7 days  > we discussed that she will need follow up in our pulmonary clinic with CT non-contrast Chest and PFTs in about 4 weeks - I placed the order and alerted the clinic  > Pulm now signed off  - SLP consulted, appreciate recs   - Passed initial swallow study and video swallow study  - Home O2 assessment complete, O2 sats 84% while awake at rest.  Requiring 3L at rest. Has home O2 supplies already.  - PT/OT   - TCU recommended  - Care management for dispo planning   > CM to follow up with Pt and family for additional TCU choices. CM to follow up with [facility] on Monday (4/7) to have them reconsider Pt's TCU referral  - Delirium precautions     HFpEF  Paroxysmal Atrial Fibrillation  Elevated troponin  Afib noted on chart review, rate controlled on admission and not on meds. Not on anticoagulation with history of recurrent bleeding. Troponin 27 -> 23 on admission. BNP 2,500 on admission, similar to 1 week ago. Echocardiogram 2021 with LVEF 55-60%. Otherwise does not appear significantly hypervolemic on exam.  - Echo updated    - LVEF 55-60% with diastolic dysfunction   - Moderate aortic and mitral regurgitation   - Elevated right ventricular systolic pressure  - PTA Kcl capsules 10 mEq BID  - PTA lasix 60 mg PO daily     Intermittent Vomiting  Patient notes she does sometimes vomit in AM, has not been a problem as much recently. Not nausea now. Could contribute to aspiration  - Will continue to monitor     Chronic T10, T12, L1, L3 Fractures  Noted on CXR, known to patient, stable.     T2DM  Not on PTA meds, multiple A1cs in last year under 6, most recent 9 months ago 5.5. Glucose on admission 116.   - Will monitor daily BMP, consider sliding scale insulin if needed or steroids given     Sacral Wound  - Noted on admission, mildly tender. Vitals and labs reassuring against systemic infection  - WOC consulted     Chronic conditions:  Depression/Anxiety - PTA sertraline, buspar, mirtazapine  Chronic diarrhea - PTA PRN loperamide  HTN, HLD, GERD, CAD - not on PTA meds            Diet: Combination Diet Easy to Chew (level 7); Thin Liquids (level 0)    DVT Prophylaxis: Enoxaparin (Lovenox) SQ  Driver Catheter: Not present  Lines: None     Cardiac Monitoring: None  Code Status: No CPR- Do NOT Intubate      Clinically Significant Risk Factors                    # Hypertension: Noted on problem list  # Acute heart failure with preserved ejection fraction: heart failure noted on problem list, last echo with EF >50%, and receiving IV diuretics               # Financial/Environmental Concerns:           Social Drivers of Health     [facility] [employer] [facility]          Disposition Plan   Medically Ready for Discharge: Anticipated in 2-4 Days         The patient's care was discussed with the Attending Physician, Dr. Collado .    Elie Hoang MD  Hospitalist Service  [facility]  Securely message with [employer] (more info)  Text page via [email or web address] Paging/Directory   ______________________________________________________________________    Interval History   NAEON. Pt reports continued SOB but that this is getting better. Wondering when she will leave the hospital and where she will be going.     Physical Exam   Vital Signs: Temp: 97.2  F (36.2  C) Temp src: Oral BP: (!) 141/65 Pulse: 87   Resp: 17 SpO2: (!) 91 % O2 Device: Nasal cannula Oxygen Delivery: 3 LPM  Weight: 127 lbs 13.87 oz    Constitutional: awake, alert, cooperative, no apparent distress  Respiratory: Fine crackles diffusely in bilateral lung fields. Fair air movement throughout. No increased work of breathing, no wheezing  Cardiovascular Normal S1 and S2, no S3 or S4  GI: Normal bowel sounds, soft, non-distended, no masses palpated, no hepatosplenomegaly  Skin: No other rashes or lesions visualized on exposed skin.   Musculoskeletal: There is no redness, warmth, or swelling of the joints. Moves all four extremities spontaneously.  Neuropsychiatric: General: normal, calm, and normal eye contact  Level of consciousness: alert / normal  Affect: normal and pleasant    Data   ------------------------- PAST 24 HR DATA REVIEWED -----------------------------------------------    I have personally reviewed the following data over the past 24  hrs:    7.2  \   10.9 (L)   / 298     142 98 35.6 (H) /  117 (H)   3.9 35 (H) 1.13 (H) \       Imaging results reviewed over the past 24 hrs:   No results found for this or any previous visit (from the past 24 hours).

## 2025-04-05 NOTE — PLAN OF CARE
Problem: Adult Inpatient Plan of Care  Goal: Absence of Hospital-Acquired Illness or Injury  Intervention: Identify and Manage Fall Risk  Recent Flowsheet Documentation  Taken 4/5/2025 0919 by Gaviria, Sydnie, RN  Safety Promotion/Fall Prevention:   activity supervised   assistive device/personal items within reach   mobility aid in reach   nonskid shoes/slippers when out of bed   safety round/check completed   lighting adjusted   clutter free environment maintained  Intervention: Prevent Skin Injury  Recent Flowsheet Documentation  Taken 4/5/2025 0919 by Sydnie Gaviria RN  Body Position:   turned   right   foot of bed elevated   heels elevated  Intervention: Prevent Infection  Recent Flowsheet Documentation  Taken 4/5/2025 0919 by Sydnie Gaviria RN  Infection Prevention:   hand hygiene promoted   rest/sleep promoted   single patient room provided  Goal: Optimal Comfort and Wellbeing  Intervention: Monitor Pain and Promote Comfort  Recent Flowsheet Documentation  Taken 4/5/2025 1000 by Sydnie Gaviria RN  Pain Management Interventions:   care clustered   distraction   emotional support   repositioned  Taken 4/5/2025 0919 by Sydnie Gaviria RN  Pain Management Interventions:   care clustered   distraction   emotional support   repositioned     Problem: Delirium  Goal: Improved Behavioral Control  Intervention: Minimize Safety Risk  Recent Flowsheet Documentation  Taken 4/5/2025 0919 by Sydnie Gaviria RN  Enhanced Safety Measures: review medications for side effects with activity  Goal: Improved Attention and Thought Clarity  Intervention: Maximize Cognitive Function  Recent Flowsheet Documentation  Taken 4/5/2025 0919 by Sydnie Gaviria RN  Reorientation Measures:   calendar in view   clock in view     Problem: Gas Exchange Impaired  Goal: Optimal Gas Exchange  Intervention: Optimize Oxygenation and Ventilation  Recent Flowsheet Documentation  Taken 4/5/2025 0919 by Everette  JAROD Otoole  Head of Bed (HOB) Positioning: HOB at 20-30 degrees     Problem: Comorbidity Management  Goal: Blood Glucose Levels Within Targeted Range  Intervention: Monitor and Manage Glycemia  Recent Flowsheet Documentation  Taken 4/5/2025 0919 by Sydnie Gaviria RN  Medication Review/Management: medications reviewed  Goal: Maintenance of Heart Failure Symptom Control  Intervention: Maintain Heart Failure Management  Recent Flowsheet Documentation  Taken 4/5/2025 0919 by Sydnie Gaviria RN  Medication Review/Management: medications reviewed  Goal: Blood Pressure in Desired Range  Intervention: Maintain Blood Pressure Management  Recent Flowsheet Documentation  Taken 4/5/2025 0919 by Sydnie Gaviria RN  Medication Review/Management: medications reviewed  Goal: Maintenance of Behavioral Health Symptom Control  Intervention: Maintain Behavioral Health Symptom Control  Recent Flowsheet Documentation  Taken 4/5/2025 0919 by Sydnie Gaviria RN  Medication Review/Management: medications reviewed   Goal Outcome Evaluation:  Patient alert and oriented x4. Forgetful. Moving with assist of 2 using gait belt. Up in chair during the day. Vitals stable on 3 L oxygen via nasal cannula. Easy to chew diet with thin liquids. IV antibiotics. Saline locked. IV steroids. SOB with exertion. Loose stools, PRN imodium given. Mepilex dressing on coccyx changed. Redness noted on heels, mepilex applied for protection. Endorses some pain in coccyx, declined medication at this time. Brother present for support in afternoon.

## 2025-04-05 NOTE — CARE PLAN
04/05/25 1252   Home Oxygen Assessment (RN/RT ONLY)   Does patient have oxygen at home? Yes   Home O2 Agency Other (add comment)  (portable tank delivered earlier this week, company unknown)   1. SpO2 on room air at rest while awake 84       Oxygen LPM at rest 3   3. SpO2 on room air during Activity/with exercise 79   4. SpO2 with oxygen during activity/with exercise 5       Oxygen LPM during activity/with exercise 91   Does patient qualify for Home O2? Yes

## 2025-04-05 NOTE — PLAN OF CARE
Problem: Adult Inpatient Plan of Care  Goal: Plan of Care Review  Description: The Plan of Care Review/Shift note should be completed every shift.  The Outcome Evaluation is a brief statement about your assessment that the patient is improving, declining, or no change.  This information will be displayed automatically on your shiftnote.  Outcome: Progressing  Flowsheets (Taken 4/4/2025 2254)  Plan of Care Reviewed With: patient  Overall Patient Progress: improving     Problem: Delirium  Goal: Optimal Coping  Outcome: Progressing     Problem: Gas Exchange Impaired  Goal: Optimal Gas Exchange  Outcome: Progressing  Intervention: Optimize Oxygenation and Ventilation  Recent Flowsheet Documentation  Taken 4/4/2025 2251 by Esther Ortiz RN  Head of Bed (HOB) Positioning: HOB at 30 degrees  Taken 4/4/2025 2051 by Esther Ortiz RN  Head of Bed (HOB) Positioning: HOB at 30 degrees  Taken 4/4/2025 1851 by Esther Ortiz RN  Head of Bed (HOB) Positioning: HOB at 30 degrees  Taken 4/4/2025 1600 by Esther Ortiz RN  Head of Bed (HOB) Positioning: HOB at 30 degrees     Problem: Wound  Goal: Optimal Coping  Outcome: Progressing     Problem: Pain Acute  Goal: Optimal Pain Control and Function  Outcome: Progressing  Intervention: Develop Pain Management Plan  Recent Flowsheet Documentation  Taken 4/4/2025 1547 by Esther Ortiz RN  Pain Management Interventions:   pain management plan reviewed with patient/caregiver   pillow support provided   repositioned  Intervention: Prevent or Manage Pain  Recent Flowsheet Documentation  Taken 4/4/2025 1600 by Esther Ortiz, RN  Medication Review/Management: medications reviewed   Goal Outcome Evaluation:      Plan of Care Reviewed With: patient    Overall Patient Progress: improvingOverall Patient Progress: improving     Pt turned is aware when to turn, coccyx area mapilex applied was red and tiny area crack. Cream applied to bottom.Pure wick in place,

## 2025-04-06 ENCOUNTER — APPOINTMENT (OUTPATIENT)
Dept: SPEECH THERAPY | Facility: HOSPITAL | Age: OVER 89
DRG: 196 | End: 2025-04-06
Payer: MEDICARE

## 2025-04-06 LAB
ANION GAP SERPL CALCULATED.3IONS-SCNC: 7 MMOL/L (ref 7–15)
BUN SERPL-MCNC: 36.3 MG/DL (ref 8–23)
CALCIUM SERPL-MCNC: 8.9 MG/DL (ref 8.8–10.4)
CHLORIDE SERPL-SCNC: 100 MMOL/L (ref 98–107)
CREAT SERPL-MCNC: 1.12 MG/DL (ref 0.51–0.95)
EGFRCR SERPLBLD CKD-EPI 2021: 46 ML/MIN/1.73M2
ERYTHROCYTE [DISTWIDTH] IN BLOOD BY AUTOMATED COUNT: 12.4 % (ref 10–15)
GLUCOSE SERPL-MCNC: 94 MG/DL (ref 70–99)
HCO3 SERPL-SCNC: 37 MMOL/L (ref 22–29)
HCT VFR BLD AUTO: 33.3 % (ref 35–47)
HGB BLD-MCNC: 10.3 G/DL (ref 11.7–15.7)
MCH RBC QN AUTO: 29.6 PG (ref 26.5–33)
MCHC RBC AUTO-ENTMCNC: 30.9 G/DL (ref 31.5–36.5)
MCV RBC AUTO: 96 FL (ref 78–100)
PLATELET # BLD AUTO: 277 10E3/UL (ref 150–450)
POTASSIUM SERPL-SCNC: 3.5 MMOL/L (ref 3.4–5.3)
RBC # BLD AUTO: 3.48 10E6/UL (ref 3.8–5.2)
SODIUM SERPL-SCNC: 144 MMOL/L (ref 135–145)
WBC # BLD AUTO: 7.1 10E3/UL (ref 4–11)

## 2025-04-06 PROCEDURE — 250N000011 HC RX IP 250 OP 636

## 2025-04-06 PROCEDURE — 250N000013 HC RX MED GY IP 250 OP 250 PS 637

## 2025-04-06 PROCEDURE — 120N000001 HC R&B MED SURG/OB

## 2025-04-06 PROCEDURE — 85014 HEMATOCRIT: CPT

## 2025-04-06 PROCEDURE — 250N000012 HC RX MED GY IP 250 OP 636 PS 637: Performed by: INTERNAL MEDICINE

## 2025-04-06 PROCEDURE — 250N000011 HC RX IP 250 OP 636: Performed by: INTERNAL MEDICINE

## 2025-04-06 PROCEDURE — 92526 ORAL FUNCTION THERAPY: CPT | Mod: GN | Performed by: SPEECH-LANGUAGE PATHOLOGIST

## 2025-04-06 PROCEDURE — 80048 BASIC METABOLIC PNL TOTAL CA: CPT

## 2025-04-06 PROCEDURE — 36415 COLL VENOUS BLD VENIPUNCTURE: CPT

## 2025-04-06 RX ORDER — AMOXICILLIN AND CLAVULANATE POTASSIUM 500; 125 MG/1; MG/1
1 TABLET, FILM COATED ORAL EVERY 12 HOURS SCHEDULED
Status: DISCONTINUED | OUTPATIENT
Start: 2025-04-06 | End: 2025-04-07 | Stop reason: HOSPADM

## 2025-04-06 RX ADMIN — BUSPIRONE HYDROCHLORIDE 10 MG: 10 TABLET ORAL at 09:34

## 2025-04-06 RX ADMIN — ENOXAPARIN SODIUM 40 MG: 40 INJECTION SUBCUTANEOUS at 17:02

## 2025-04-06 RX ADMIN — GABAPENTIN 100 MG: 100 CAPSULE ORAL at 19:58

## 2025-04-06 RX ADMIN — GABAPENTIN 100 MG: 100 CAPSULE ORAL at 09:34

## 2025-04-06 RX ADMIN — LOPERAMIDE HYDROCHLORIDE 2 MG: 2 CAPSULE ORAL at 17:31

## 2025-04-06 RX ADMIN — ACETAMINOPHEN 650 MG: 325 TABLET ORAL at 09:47

## 2025-04-06 RX ADMIN — LOPERAMIDE HYDROCHLORIDE 2 MG: 2 CAPSULE ORAL at 09:47

## 2025-04-06 RX ADMIN — PREDNISONE 40 MG: 20 TABLET ORAL at 09:33

## 2025-04-06 RX ADMIN — MIRTAZAPINE 7.5 MG: 7.5 TABLET, FILM COATED ORAL at 20:01

## 2025-04-06 RX ADMIN — POTASSIUM CHLORIDE 10 MEQ: 750 TABLET, EXTENDED RELEASE ORAL at 19:58

## 2025-04-06 RX ADMIN — SERTRALINE HYDROCHLORIDE 150 MG: 100 TABLET ORAL at 09:34

## 2025-04-06 RX ADMIN — POTASSIUM CHLORIDE 10 MEQ: 750 TABLET, EXTENDED RELEASE ORAL at 09:34

## 2025-04-06 RX ADMIN — PIPERACILLIN AND TAZOBACTAM 3.38 G: 3; .375 INJECTION, POWDER, FOR SOLUTION INTRAVENOUS at 09:35

## 2025-04-06 RX ADMIN — AMOXICILLIN AND CLAVULANATE POTASSIUM 1 TABLET: 500; 125 TABLET, FILM COATED ORAL at 19:58

## 2025-04-06 RX ADMIN — NYSTATIN: 100000 CREAM TOPICAL at 09:35

## 2025-04-06 RX ADMIN — POLYPROPYLENE GLYCOL 400, PROPYLENE GLYCOL 2 DROP: .4; .3 LIQUID OPHTHALMIC at 09:35

## 2025-04-06 RX ADMIN — FUROSEMIDE 60 MG: 20 TABLET ORAL at 09:34

## 2025-04-06 RX ADMIN — BUSPIRONE HYDROCHLORIDE 10 MG: 10 TABLET ORAL at 19:58

## 2025-04-06 RX ADMIN — NYSTATIN: 100000 CREAM TOPICAL at 20:01

## 2025-04-06 ASSESSMENT — ACTIVITIES OF DAILY LIVING (ADL)
ADLS_ACUITY_SCORE: 99
ADLS_ACUITY_SCORE: 99
ADLS_ACUITY_SCORE: 97
ADLS_ACUITY_SCORE: 97
ADLS_ACUITY_SCORE: 99
ADLS_ACUITY_SCORE: 97
ADLS_ACUITY_SCORE: 99
ADLS_ACUITY_SCORE: 97
ADLS_ACUITY_SCORE: 97
ADLS_ACUITY_SCORE: 99
ADLS_ACUITY_SCORE: 97
ADLS_ACUITY_SCORE: 99
ADLS_ACUITY_SCORE: 97

## 2025-04-06 NOTE — PLAN OF CARE
Problem: Adult Inpatient Plan of Care  Goal: Absence of Hospital-Acquired Illness or Injury  Intervention: Identify and Manage Fall Risk  Recent Flowsheet Documentation  Taken 4/6/2025 0947 by Gaviria, Sydnie, RN  Safety Promotion/Fall Prevention:   activity supervised   assistive device/personal items within reach   mobility aid in reach   nonskid shoes/slippers when out of bed   safety round/check completed   lighting adjusted   clutter free environment maintained  Intervention: Prevent Skin Injury  Recent Flowsheet Documentation  Taken 4/6/2025 0947 by Sydnie Gaviria RN  Body Position:   turned   right   heels elevated   foot of bed elevated  Taken 4/6/2025 0830 by Sydnie Gaviria RN  Body Position:   supine, head elevated   foot of bed elevated   heels elevated  Intervention: Prevent Infection  Recent Flowsheet Documentation  Taken 4/6/2025 0947 by Sydnie Gaviria RN  Infection Prevention:   hand hygiene promoted   rest/sleep promoted   single patient room provided  Goal: Optimal Comfort and Wellbeing  Intervention: Monitor Pain and Promote Comfort  Recent Flowsheet Documentation  Taken 4/6/2025 1045 by Sydnie Gaviria RN  Pain Management Interventions:   care clustered   distraction   emotional support   pillow support provided   quiet environment facilitated   rest  Taken 4/6/2025 0947 by Sydnie Gaviria RN  Pain Management Interventions: medication (see MAR)  Intervention: Provide Person-Centered Care  Recent Flowsheet Documentation  Taken 4/6/2025 0947 by Sydnie Gaviria RN  Trust Relationship/Rapport:   care explained   choices provided     Problem: Delirium  Goal: Improved Behavioral Control  Intervention: Minimize Safety Risk  Recent Flowsheet Documentation  Taken 4/6/2025 0947 by Sydnie Gaviria RN  Enhanced Safety Measures: review medications for side effects with activity  Trust Relationship/Rapport:   care explained   choices provided  Goal: Improved Attention  and Thought Clarity  Intervention: Maximize Cognitive Function  Recent Flowsheet Documentation  Taken 4/6/2025 0947 by Sydnie Gaviria RN  Reorientation Measures:   calendar in view   clock in view     Problem: Gas Exchange Impaired  Goal: Optimal Gas Exchange  Intervention: Optimize Oxygenation and Ventilation  Recent Flowsheet Documentation  Taken 4/6/2025 0947 by Sydnie Gaviria RN  Head of Bed (HOB) Positioning: HOB at 20-30 degrees  Taken 4/6/2025 0830 by Sydnie Gaviria RN  Head of Bed (HOB) Positioning: HOB at 20-30 degrees     Problem: Comorbidity Management  Goal: Blood Glucose Levels Within Targeted Range  Intervention: Monitor and Manage Glycemia  Recent Flowsheet Documentation  Taken 4/6/2025 0947 by Sydnie Gaviria RN  Medication Review/Management: medications reviewed  Goal: Maintenance of Heart Failure Symptom Control  Intervention: Maintain Heart Failure Management  Recent Flowsheet Documentation  Taken 4/6/2025 0947 by Sydnie Gaviria RN  Medication Review/Management: medications reviewed  Goal: Blood Pressure in Desired Range  Intervention: Maintain Blood Pressure Management  Recent Flowsheet Documentation  Taken 4/6/2025 0947 by Sydnie Gaviria RN  Medication Review/Management: medications reviewed  Goal: Maintenance of Behavioral Health Symptom Control  Intervention: Maintain Behavioral Health Symptom Control  Recent Flowsheet Documentation  Taken 4/6/2025 0947 by Sydnie Gaviria RN  Medication Review/Management: medications reviewed   Goal Outcome Evaluation:  Patient alert and oriented x4. Forgetful. Moving with assist of 2 using gait belt. Up in chair during the day. Vitals stable on 2 L oxygen via nasal cannula. Easy to chew diet with thin liquids. Oral antibiotics. Saline locked. Oral steroids. SOB with exertion. Loose stools, PRN imodium given. Mepilex dressing on coccyx changed. Redness noted on heels, mepilex applied for protection. Endorses some pain in  coccyx and headache, PRN tylenol given. Daughter present for support in afternoon.

## 2025-04-06 NOTE — PROGRESS NOTES
[facility]    Medicine Progress Note - Hospitalist Service    Date of Admission:  4/2/2025    Assessment & Plan   Maranda Downing is a 89 year old female admitted on 4/2/2025. She has a history of recurrent UTI, HFpEF, RBBB, CAD, DM2, Fatty Liver, HTN, HLD, IBS, GERD, Thyroid Nodule who is admitted for worsening shortness of breath and hypoxia with likely underlying chronic lung disease.     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 Requiring 4-5L NC on admission. CXR during previous admission with significant interstitial opacities B/L, unclear if underlying pre-existing ILD vs acute changes. Discharged on 2L NC a week prior to this admission. CT on admission with worsening groundglass opacities and fibrosis, along with pneumonitis. Pulmonary HTN seen as well. Rales diffusely on lung exam though related to likely interstitial lung disease. There was initially some concern for chronic aspiration, but patient passed SLP assessment including video swallow study. Patient has been vitally normal, no leukocytosis, procal normal reassuring against new acute infection. Pulmonology was consulted. Treating as pneumonia and pneumonitis with antibiotics and steroids.  - Transition to PO Augmentin today (4/6-4/9 for 7 day total antibiotic course)  - S/p Zosyn (4/2-4/6)  - Pulmonology consulted, appreciate recs   > Steroids for the pneumonitis - Changed to PO 40mg prednisone for 7 days  > Follow up in our pulmonary clinic with CT non-contrast Chest and PFTs in about 4 weeks (pulm helping arrange)  > Pulm now signed off  - SLP consulted, appreciate recs   - Passed swallow study and video swallow study. No further concerns for dysphagia. Patient is feeding herself.  - Home O2 assessment complete, O2 sats 84% while awake at rest. Requiring 3L at rest. Has home O2 supplies  already.  - PT/OT   - TCU recommended  - Care management for dispo planning   > CM to follow up with Pt and family for additional TCU choices. CM to follow up with [facility] on Monday (4/7) to have them reconsider Pt's TCU referral.   - Delirium precautions     HFpEF  Paroxysmal Atrial Fibrillation  Elevated troponin  Afib noted on chart review, rate controlled on admission and not on meds. Not on anticoagulation with history of recurrent bleeding. Troponin 27 -> 23 on admission. BNP 2,500 on admission, similar to 1 week ago. Echocardiogram 2021 with LVEF 55-60%. Otherwise does not appear significantly hypervolemic on exam.  - Echo updated    - LVEF 55-60% with diastolic dysfunction   - Moderate aortic and mitral regurgitation   - Elevated right ventricular systolic pressure  - PTA Kcl capsules 10 mEq BID  - PTA lasix 60 mg PO daily     Chronic T10, T12, L1, L3 Fractures  Noted on CXR, known to patient, stable.     T2DM  Not on PTA meds, multiple A1cs in last year under 6, most recent 9 months ago 5.5. Glucose on admission 116.   - Will monitor daily BMP, consider sliding scale insulin if needed or steroids given     Sacral Wound  - Noted on admission, mildly tender. Vitals and labs reassuring against systemic infection  - WOC consulted     Chronic conditions:  Depression/Anxiety - PTA sertraline, buspar, mirtazapine  Chronic diarrhea - PTA PRN loperamide  HTN, HLD, GERD, CAD - not on PTA meds            Diet: Combination Diet Easy to Chew (level 7); Thin Liquids (level 0)    DVT Prophylaxis: Enoxaparin (Lovenox) SQ  Driver Catheter: Not present  Lines: None     Cardiac Monitoring: None  Code Status: No CPR- Do NOT Intubate      Clinically Significant Risk Factors                   # Hypertension: Noted on problem list  # Acute heart failure with preserved ejection fraction: heart failure noted on problem list, last echo with EF >50%, and receiving IV diuretics               # Financial/Environmental  Concerns:           Social Drivers of Health     Received from [facility] [employer] [facility]    Social Connections          Disposition Plan   Medically Ready for Discharge: Now         The patient's care was discussed with the Attending Physician, Dr. Collado .    Micki Michael MD  Hospitalist Service  [facility]  Securely message with [employer] (more info)  Text page via [employer] Paging/Directory   ______________________________________________________________________    Interval History   NAEON. Pt reports continued SOB but that this is getting better. Discussed TCU plan today, which she understands.     Physical Exam   Vital Signs: Temp: 98  F (36.7  C) Temp src: Oral BP: 117/54 Pulse: 80   Resp: 18 SpO2: 94 % O2 Device: Nasal cannula Oxygen Delivery: 2 LPM  Weight: 127 lbs 13.87 oz    Constitutional: awake, alert, cooperative, no apparent distress  Respiratory: Fine crackles diffusely in bilateral lung fields. Fair air movement throughout. No increased work of breathing, no wheezing  Cardiovascular Normal S1 and S2, no S3 or S4  GI: Normal bowel sounds, soft, non-distended, no masses palpated, no hepatosplenomegaly  Skin: No other rashes or lesions visualized on exposed skin.   Musculoskeletal: There is no redness, warmth, or swelling of the joints. Moves all four extremities spontaneously.  Neuropsychiatric: General: normal, calm, and normal eye contact  Level of consciousness: alert / normal  Affect: normal and pleasant    Data   ------------------------- PAST 24 HR DATA REVIEWED -----------------------------------------------    I have personally reviewed the following data over the past 24 hrs:    7.1  \   10.3 (L)   / 277     144 100 36.3 (H) /  94   3.5 37 (H) 1.12 (H) \       Imaging results reviewed over the past 24 hrs:   No results found for this or any previous visit (from the past 24 hours).

## 2025-04-06 NOTE — PLAN OF CARE
Problem: Adult Inpatient Plan of Care  Goal: Plan of Care Review  Description: The Plan of Care Review/Shift note should be completed every shift.  The Outcome Evaluation is a brief statement about your assessment that the patient is improving, declining, or no change.  This information will be displayed automatically on your shiftnote.  Outcome: Progressing  Flowsheets (Taken 4/5/2025 2251)  Plan of Care Reviewed With: patient  Overall Patient Progress: improving     Problem: Delirium  Goal: Optimal Coping  Outcome: Progressing  Intervention: Optimize Psychosocial Adjustment to Delirium  Recent Flowsheet Documentation  Taken 4/5/2025 1600 by Esther Ortiz, RN  Family/Support System Care: support provided     Problem: Gas Exchange Impaired  Goal: Optimal Gas Exchange  Outcome: Progressing  Intervention: Optimize Oxygenation and Ventilation  Recent Flowsheet Documentation  Taken 4/5/2025 2051 by Esther Ortiz RN  Head of Bed (HOB) Positioning: HOB at 30 degrees  Taken 4/5/2025 1851 by Esther Ortiz, RN  Head of Bed (HOB) Positioning: HOB at 30 degrees  Taken 4/5/2025 1651 by Esther Ortiz, RN  Head of Bed (HOB) Positioning: HOB at 30 degrees  Taken 4/5/2025 1600 by Esther Ortiz, RN  Head of Bed (HOB) Positioning: HOB at 30 degrees     Problem: Wound  Goal: Optimal Coping  Outcome: Progressing  Intervention: Support Patient and Family Response  Recent Flowsheet Documentation  Taken 4/5/2025 1600 by Esther Ortiz, RN  Family/Support System Care: support provided   Goal Outcome Evaluation:      Plan of Care Reviewed With: patient    Overall Patient Progress: improvingOverall Patient Progress: improving     PT SAYS WHEN LYING ON HER BACK THE BOTTOM HURT THERE IS SMALL CUT on coccyx area and mapilex applied and lotion. Under both breast slight redness and left breast small crack. WOC was ordered and medication applied.

## 2025-04-06 NOTE — PLAN OF CARE
Problem: Adult Inpatient Plan of Care  Goal: Absence of Hospital-Acquired Illness or Injury  Outcome: Progressing  Intervention: Identify and Manage Fall Risk  Recent Flowsheet Documentation  Taken 4/6/2025 0000 by Maranda Quiles RN  Safety Promotion/Fall Prevention:   activity supervised   assistive device/personal items within reach   mobility aid in reach   nonskid shoes/slippers when out of bed   safety round/check completed   lighting adjusted   clutter free environment maintained  Intervention: Prevent Skin Injury  Recent Flowsheet Documentation  Taken 4/6/2025 0051 by Maranda Quiles RN  Body Position:   turned   right   legs elevated  Intervention: Prevent Infection  Recent Flowsheet Documentation  Taken 4/6/2025 0000 by Maranda Quiles RN  Infection Prevention:   hand hygiene promoted   rest/sleep promoted   single patient room provided  Goal: Optimal Comfort and Wellbeing  Outcome: Progressing  Intervention: Provide Person-Centered Care  Recent Flowsheet Documentation  Taken 4/6/2025 0000 by Maranda Quiles RN  Trust Relationship/Rapport:   care explained   choices provided     Problem: Delirium  Goal: Improved Sleep  Outcome: Progressing     Problem: Delirium  Goal: Improved Attention and Thought Clarity  Outcome: Progressing  Intervention: Maximize Cognitive Function  Recent Flowsheet Documentation  Taken 4/6/2025 0000 by Maranda Quiles RN  Reorientation Measures:   calendar in view   clock in view     Problem: Gas Exchange Impaired  Goal: Optimal Gas Exchange  Outcome: Progressing  Intervention: Optimize Oxygenation and Ventilation  Recent Flowsheet Documentation  Taken 4/6/2025 0051 by Maranda Quiles RN  Head of Bed (HOB) Positioning: HOB at 20-30 degrees     Problem: Wound  Goal: Skin Health and Integrity  Outcome: Progressing  Intervention: Optimize Skin Protection  Recent Flowsheet Documentation  Taken 4/6/2025 0051 by Maranda Quiles RN  Head of Bed (HOB) Positioning: HOB at 20-30 degrees  Taken 4/6/2025 0000 by Maranda Quiles  RN  Activity Management:   activity adjusted per tolerance   activity encouraged     Problem: Pain Acute  Goal: Optimal Pain Control and Function  Outcome: Progressing  Intervention: Prevent or Manage Pain  Recent Flowsheet Documentation  Taken 4/6/2025 0000 by HerMaranda RN  Medication Review/Management: medications reviewed     Goal Outcome Evaluation:       Pt is A&O with intermittent confusion & forgetfulness. Denies pain & SOB at rest. On 3L NC w/ sat in mid-high 90s. Sacral mepliex intact. Pt retaining urine with bladder scan volume of 352ml. Pt fail to void after encouragement. Straight cath output 350ml. On IV zosyn.

## 2025-04-06 NOTE — PROGRESS NOTES
Speech Language Therapy Discharge Summary    Reason for therapy discharge:    All goals and outcomes met, no further needs identified.    Progress towards therapy goal(s). See goals on Care Plan in [facility] electronic health record for goal details.  Goals met    Therapy recommendation(s):    No further therapy is recommended. Pt is tolerating IDDSI level 7 (Easy to Chew) solids and thin liquids with strategies (alternating solids and liquids, small sips/no straws, double swallow) and wear of her dentures with adhesive.

## 2025-04-07 ENCOUNTER — TELEPHONE (OUTPATIENT)
Dept: PULMONOLOGY | Facility: CLINIC | Age: OVER 89
End: 2025-04-07
Payer: MEDICARE

## 2025-04-07 VITALS
WEIGHT: 127.87 LBS | HEART RATE: 99 BPM | OXYGEN SATURATION: 92 % | DIASTOLIC BLOOD PRESSURE: 65 MMHG | SYSTOLIC BLOOD PRESSURE: 145 MMHG | TEMPERATURE: 97.6 F | HEIGHT: 63 IN | RESPIRATION RATE: 16 BRPM | BODY MASS INDEX: 22.66 KG/M2

## 2025-04-07 LAB
ANION GAP SERPL CALCULATED.3IONS-SCNC: 7 MMOL/L (ref 7–15)
BUN SERPL-MCNC: 30.4 MG/DL (ref 8–23)
CALCIUM SERPL-MCNC: 8.7 MG/DL (ref 8.8–10.4)
CHLORIDE SERPL-SCNC: 101 MMOL/L (ref 98–107)
CREAT SERPL-MCNC: 0.9 MG/DL (ref 0.51–0.95)
EGFRCR SERPLBLD CKD-EPI 2021: 60 ML/MIN/1.73M2
ERYTHROCYTE [DISTWIDTH] IN BLOOD BY AUTOMATED COUNT: 12.3 % (ref 10–15)
GLUCOSE BLDC GLUCOMTR-MCNC: 199 MG/DL (ref 70–99)
GLUCOSE SERPL-MCNC: 88 MG/DL (ref 70–99)
HCO3 SERPL-SCNC: 35 MMOL/L (ref 22–29)
HCT VFR BLD AUTO: 34.1 % (ref 35–47)
HGB BLD-MCNC: 10.7 G/DL (ref 11.7–15.7)
MCH RBC QN AUTO: 30 PG (ref 26.5–33)
MCHC RBC AUTO-ENTMCNC: 31.4 G/DL (ref 31.5–36.5)
MCV RBC AUTO: 96 FL (ref 78–100)
PLATELET # BLD AUTO: 259 10E3/UL (ref 150–450)
POTASSIUM SERPL-SCNC: 3.4 MMOL/L (ref 3.4–5.3)
RBC # BLD AUTO: 3.57 10E6/UL (ref 3.8–5.2)
SODIUM SERPL-SCNC: 143 MMOL/L (ref 135–145)
WBC # BLD AUTO: 7 10E3/UL (ref 4–11)

## 2025-04-07 PROCEDURE — 84520 ASSAY OF UREA NITROGEN: CPT

## 2025-04-07 PROCEDURE — 36415 COLL VENOUS BLD VENIPUNCTURE: CPT

## 2025-04-07 PROCEDURE — 80048 BASIC METABOLIC PNL TOTAL CA: CPT

## 2025-04-07 PROCEDURE — 85014 HEMATOCRIT: CPT

## 2025-04-07 PROCEDURE — 250N000013 HC RX MED GY IP 250 OP 250 PS 637

## 2025-04-07 PROCEDURE — 250N000012 HC RX MED GY IP 250 OP 636 PS 637: Performed by: INTERNAL MEDICINE

## 2025-04-07 PROCEDURE — 85041 AUTOMATED RBC COUNT: CPT

## 2025-04-07 PROCEDURE — 82310 ASSAY OF CALCIUM: CPT

## 2025-04-07 RX ORDER — PREDNISONE 20 MG/1
40 TABLET ORAL DAILY
Qty: 4 TABLET | Refills: 0 | Status: SHIPPED | OUTPATIENT
Start: 2025-04-08 | End: 2025-04-10

## 2025-04-07 RX ORDER — AMOXICILLIN AND CLAVULANATE POTASSIUM 500; 125 MG/1; MG/1
1 TABLET, FILM COATED ORAL EVERY 12 HOURS
Qty: 4 TABLET | Refills: 0 | Status: SHIPPED | OUTPATIENT
Start: 2025-04-07

## 2025-04-07 RX ORDER — AMOXICILLIN AND CLAVULANATE POTASSIUM 500; 125 MG/1; MG/1
1 TABLET, FILM COATED ORAL EVERY 12 HOURS
Qty: 4 TABLET | Refills: 0 | Status: SHIPPED | OUTPATIENT
Start: 2025-04-07 | End: 2025-04-07

## 2025-04-07 RX ORDER — PREDNISONE 20 MG/1
40 TABLET ORAL DAILY
Qty: 2 TABLET | Refills: 0 | Status: SHIPPED | OUTPATIENT
Start: 2025-04-08 | End: 2025-04-07

## 2025-04-07 RX ADMIN — GABAPENTIN 100 MG: 100 CAPSULE ORAL at 08:14

## 2025-04-07 RX ADMIN — POLYPROPYLENE GLYCOL 400, PROPYLENE GLYCOL 2 DROP: .4; .3 LIQUID OPHTHALMIC at 08:21

## 2025-04-07 RX ADMIN — LOPERAMIDE HYDROCHLORIDE 2 MG: 2 CAPSULE ORAL at 08:24

## 2025-04-07 RX ADMIN — PREDNISONE 40 MG: 20 TABLET ORAL at 08:13

## 2025-04-07 RX ADMIN — BUSPIRONE HYDROCHLORIDE 10 MG: 10 TABLET ORAL at 08:14

## 2025-04-07 RX ADMIN — NYSTATIN 1 APPLICATOR: 100000 CREAM TOPICAL at 08:20

## 2025-04-07 RX ADMIN — AMOXICILLIN AND CLAVULANATE POTASSIUM 1 TABLET: 500; 125 TABLET, FILM COATED ORAL at 08:14

## 2025-04-07 RX ADMIN — FUROSEMIDE 60 MG: 20 TABLET ORAL at 08:13

## 2025-04-07 RX ADMIN — POTASSIUM CHLORIDE 10 MEQ: 750 TABLET, EXTENDED RELEASE ORAL at 08:14

## 2025-04-07 RX ADMIN — SERTRALINE HYDROCHLORIDE 150 MG: 100 TABLET ORAL at 08:13

## 2025-04-07 ASSESSMENT — ACTIVITIES OF DAILY LIVING (ADL)
ADLS_ACUITY_SCORE: 99

## 2025-04-07 NOTE — PLAN OF CARE
Goal Outcome Evaluation:  Patient alert and oriented x4. Using 2L oxygen via nasal canula. Denies shortness of breath. Patient had two loose stool during shift, incontinence. Mepilex on sacrum changed. Pain reported is due to sacrum wound, able to relieve with repositioning.     Assist of 2 with gait belt to pivot from chair to bed. Patient requires assistance ordering meals and tray set up; tolerates easy to chew diet with thin liquids and no straws. Takes pills whole (break larger pills) with pudding and water sips.       Problem: Delirium  Goal: Optimal Coping  Outcome: Progressing     Problem: Gas Exchange Impaired  Goal: Optimal Gas Exchange  Outcome: Progressing  Intervention: Optimize Oxygenation and Ventilation  Recent Flowsheet Documentation  Taken 4/6/2025 2051 by Mary Gutierrez, RN  Head of Bed (HOB) Positioning: HOB at 20-30 degrees     Problem: Comorbidity Management  Goal: Blood Pressure in Desired Range  Outcome: Progressing  Intervention: Maintain Blood Pressure Management  Recent Flowsheet Documentation  Taken 4/6/2025 1700 by Mary Gutierrez RN  Medication Review/Management: medications reviewed     Problem: Wound  Goal: Improved Oral Intake  Outcome: Progressing  Goal: Optimal Pain Control and Function  Outcome: Progressing  Goal: Skin Health and Integrity  Outcome: Progressing  Intervention: Optimize Skin Protection  Recent Flowsheet Documentation  Taken 4/6/2025 2051 by Mary Gutierrez, RN  Head of Bed (HOB) Positioning: HOB at 20-30 degrees  Taken 4/6/2025 2000 by Mary Gutierrez, RN  Activity Management: back to bed  Taken 4/6/2025 1700 by Mary Gutierrez, RN  Activity Management: up in chair

## 2025-04-07 NOTE — TELEPHONE ENCOUNTER
RN called and spoke with daughter who advised pt was being transferred to TCU and unclear of next plans. RN offered to call and leave our direct nurse line for them to call back if they decide to purse this referral. Daughter appreciated the offer and asked writer to do this. RN left message with direct nurse line to call back if interested.  Kasia Luna RN BSN

## 2025-04-07 NOTE — PLAN OF CARE
Goal Outcome Evaluation:       Patient discharged at 4:30 via [facility]. Daughter with her and patient sent with her belongings. Questions answered and patient acknowledged understanding.

## 2025-04-07 NOTE — DISCHARGE SUMMARY
[facility]  Hospitalist Discharge Summary      Date of Admission:  4/2/2025  Date of Discharge:  4/7/2025  Discharging Provider: Elie Hoang MD  Discharge Service: Hospitalist Service    Discharge Diagnoses   Acute Hypoxic Respiratory Failure, improved  Pneumonia and pneumonitis, resolved  ILD  Pulmonary HTN  HFpEF  Paroxysmal Atrial Fibrillation  Elevated troponin  Chronic T10, T12, L1, L3 Fractures  T2DM    Clinically Significant Risk Factors          Follow-ups Needed After Discharge   Unresulted Labs Ordered in the Past 30 Days of this Admission       No orders found from 3/3/2025 to 4/3/2025.        These results will be followed up by Joy López    Discharge Disposition   Discharged to short-term care facility  Condition at discharge: Stable    Hospital Course   Maranda Downing is a 89 year old female admitted on 4/2/2025. She has a history of recurrent UTI, HFpEF, RBBB, CAD, DM2, Fatty Liver, HTN, HLD, IBS, GERD, Thyroid Nodule who is admitted for worsening shortness of breath and hypoxia with likely underlying chronic lung disease.     Acute Hypoxic Respiratory Failure, improved  Pneumonia and pneumonitis, resolved  ILD  Pulmonary HTN  Admitted for worsening hypoxia for 4 days with nonproductive cough. Requiring 4-5L NC on admission. CXR during previous admission with significant interstitial opacities B/L, unclear if underlying pre-existing ILD vs acute changes. Discharged on 2L NC a week prior to this admission. CT on admission with worsening groundglass opacities and fibrosis, along with pneumonitis. Pulmonary HTN seen as well. Rales diffusely on lung exam though related to likely interstitial lung disease. There was initially some concern for chronic aspiration, but patient passed SLP assessment including video swallow study. Patient has been vitally normal, no leukocytosis, procal normal reassuring against new acute infection. Pulmonology was consulted. Treating as  pneumonia and pneumonitis with antibiotics and steroids. Transitioned to PO Augmentin 4/6 w/o issue.   - Discharging on PO Augmentin, last dose 4/9  - 40 mg prednisone daily, last dose 4/9  - Follow up in our pulmonary clinic with CT non-contrast Chest and PFTs in about 4 weeks (pulm helping arrange)  - Passed swallow study and video swallow study. No further concerns for dysphagia. Patient is feeding herself.  - Home O2 assessment complete, O2 sats 84% while awake at rest. Requiring 3L at rest. Has home O2 supplies already.    HFpEF  Paroxysmal Atrial Fibrillation  Elevated troponin  Afib noted on chart review, rate controlled on admission and not on meds. Not on anticoagulation with history of recurrent bleeding. Troponin 27 -> 23 on admission. BNP 2,500 on admission, similar to 1 week ago. Echocardiogram 2021 with LVEF 55-60%. Otherwise does not appear significantly hypervolemic on exam. No further cardiac issues here.      Chronic T10, T12, L1, L3 Fractures  Noted on CXR, known to patient, stable. NTD.      T2DM  Not on PTA meds, multiple A1cs in last year under 6, most recent 9 months ago 5.5. Glucose on admission 116, remained well controlled through hospital stay.     Sacral Wound  Noted on admission, mildly tender. Vitals and labs reassuring against systemic infection. WOC team consulted who applied mirplex cream here during hospitalization.     Chronic conditions:  Depression/Anxiety - PTA sertraline, buspar, mirtazapine  Chronic diarrhea - PTA PRN loperamide  HTN, HLD, GERD, CAD - not on PTA meds      Consultations This Hospital Stay   PULMONARY IP CONSULT  PHYSICAL THERAPY ADULT IP CONSULT  OCCUPATIONAL THERAPY ADULT IP CONSULT  CARE MANAGEMENT / SOCIAL WORK IP CONSULT  SPEECH LANGUAGE PATH ADULT IP CONSULT  SPEECH LANGUAGE PATH ADULT IP CONSULT  CARE MANAGEMENT / SOCIAL WORK IP CONSULT  PHARMACY IP CONSULT  WOUND OSTOMY CONTINENCE NURSE  IP CONSULT    Code Status   No CPR- Do NOT Intubate    Elie  MD Natalya  [place] [id number] [facility] [address]  Phone: [phone]  Fax: [phone]  ______________________________________________________________________    Physical Exam   Vital Signs: Temp: 97.6  F (36.4  C) Temp src: Axillary BP: (!) 145/65 Pulse: 99   Resp: 16 SpO2: 92 % O2 Device: Nasal cannula Oxygen Delivery: 2 LPM  Weight: 127 lbs 13.87 oz    Constitutional: Sitting up in bed eating toast, pleasant, NAD  Respiratory: Fair air movement throughout. No increased work of breathing, no wheezing  Cardiovascular Normal S1 and S2, no S3 or S4  GI: Normal bowel sounds, soft, non-distended, no masses palpated, no hepatosplenomegaly  Skin: No other rashes or lesions visualized on exposed skin.   Musculoskeletal: There is no redness, warmth, or swelling of the joints. Moves all four extremities spontaneously.  Neuropsychiatric: General: normal, calm, and normal eye contact  Level of consciousness: alert / normal  Affect: normal and pleasant       Primary Care Physician   Joy López    Discharge Orders      CT Chest w/o Contrast     Adult Pulmonary Medicine  Referral      General PFT Lab (Please always keep checked)     Pulmonary Function Test    .     Significant Results and Procedures   Most Recent 3 CBC's:  Recent Labs   Lab Test 04/07/25  0522 04/06/25  0500 04/05/25  0533   WBC 7.0 7.1 7.2   HGB 10.7* 10.3* 10.9*   MCV 96 96 95    277 298     Most Recent 3 BMP's:  Recent Labs   Lab Test 04/07/25  0522 04/06/25  0500 04/05/25  0533    144 142   POTASSIUM 3.4 3.5 3.9   CHLORIDE 101 100 98   CO2 35* 37* 35*   BUN 30.4* 36.3* 35.6*   CR 0.90 1.12* 1.13*   ANIONGAP 7 7 9   HETAL 8.7* 8.9 9.2   GLC 88 94 117*   ,   Results for orders placed or performed during the hospital encounter of 04/02/25   Chest CT w/o contrast    Narrative    EXAM: CT CHEST W/O CONTRAST  LOCATION: [facility]  DATE: 4/2/2025    INDICATION:  Worsening hypoxia and dyspnea  COMPARISON: Chest x-ray 3/26/2025, CT chest 20 2022 and older studies  TECHNIQUE: CT chest without IV contrast. Multiplanar reformats were obtained. Dose reduction techniques were used.  CONTRAST: None.    FINDINGS:   LUNGS AND PLEURA: Extensive, heterogeneous groundglass opacities are noted bilaterally, left greater than right with areas of segmental sparing posteriorly in the right upper and middle lobes and anteriorly in the right lower lobe. No intralobular septal   thickening. Changes are superimposed over chronic areas of subpleural reticulation, traction bronchiectasis and a few subpleural cysts in the left upper lobe anteriorly. There is a very small left pleural effusion.    MEDIASTINUM/AXILLAE: Less than 1 cm right thyroid nodule is unchanged. Main pulmonary artery is enlarged at 3.8 cm. Extensive calcification aorta and branches. No aneurysm. Extensive mitral annular calcifications.    CORONARY ARTERY CALCIFICATION: Mild.    UPPER ABDOMEN: Quite distended gallbladder with large gallstone in the neck and hyperdense bile and a few layering smaller stones dependently. Appearance is unchanged and there are no inflammatory changes about the gallbladder. Scattered colonic   diverticulosis. Left renal cyst again noted. Aorta and branches are heavily calcified.    MUSCULOSKELETAL: Patient's developed new vertebral body compression fractures since 2022. There is a new vertebral plana at L1, compression of the superior endplate of L3 with sclerosis and 50% loss of vertebral body height. Compression fractures at T12   and T10 are unchanged.      Impression    IMPRESSION:   1.  Patient has developed heterogeneous, diffuse groundglass opacities without intralobular septal thickening, left greater than right. Changes are superimposed over underlying pulmonary fibrosis, non-UIP pattern as noted above. Pattern suggests acute   pneumonitis. Consider pulmonary consultation.  2.  Trace left  effusion without  intralobular septal thickening to suggest failure.  3.  Enlarged main pulmonary artery consistent with pulmonary arterial hypertension.  4.  Compression fractures at L1 and L3, new since .  5.  Cholelithiasis with very large gallstone in the gallbladder neck and no signs of acute inflammation, unchanged.  6.  Other noncritical findings as noted above.     XR Video Swallow with SLP or OT    Narrative    EXAM: XR VIDEO SWALLOW WITH SLP OR OT  LOCATION: [facility]  DATE: 4/3/2025    INDICATION: Difficulty swallowing.  COMPARISON: None.    TECHNIQUE: Routine swallow study with speech pathology using multiple barium thicknesses.    RADIATION DOSE: Dose Area Product 77.23 microGy-m2    FINDINGS:   Swallow study with Speech Pathology using multiple barium thicknesses.     There are variable amounts of penetration with thin barium. This does not always clear and slowly dropped to the level of the cords during the study which was symptomatic with a cough.    Patient also does not swallow everything she takes into the oral cavity at once. The subsequent swallow of the retained component is much more discoordinated than the initial swallow and one episode of deep penetration this occurred.    No appreciable difference with a chin tuck.    No significant stasis in the piriform sinuses or vallecula.    Please see speech pathology note       Echocardiogram Complete     Value    LVEF  55-60%    Narrative    [id number]  [id number]  [id number]  [id number]^MACO^BISMARK^[place] [id number]     Name: NNEKA SNYDER  MRN: [id number]  : 1935  Study Date: 2025 08:01 AM  Age: 89 yrs  Gender: Female  Patient Location: [facility]  Reason For Study: Pulmonary Hypertension  Ordering Physician: BISMARK DEWEY  Referring Physician: BISMARK DEWEY  Performed By:      BSA: 1.6 m2  Height: 62 in  Weight: 129 lb  HR: 92  BP:  138/59 mmHg  ______________________________________________________________________________  Procedure  Echocardiogram with two-dimensional, color and spectral Doppler. Definity (NDC  #04160-083) given intravenously. Adequate quality two-dimensional was  performed and interpreted.  ______________________________________________________________________________  Interpretation Summary     LV ejection fraction is 55-60%. Grade III or advanced diastolic dysfunction.  The right ventricle is mildly dilated. The right ventricular systolic function  is normal.  Mild valvular aortic stenosis. There is moderate (2+) aortic regurgitation.  There is moderate (2+) mitral regurgitation.  There is mild to moderate (1-2+) tricuspid regurgitation. The right  ventricular systolic pressure is approximated at 33.1 mmHg plus the right  atrial pressure.     ______________________________________________________________________________  Left Ventricle  The left ventricle is normal in size. There is normal left ventricular wall  thickness. A sigmoid septum is present. Left ventricular systolic function is  normal. The visual ejection fraction is 55-60%. Grade III or advanced  diastolic dysfunction. No regional wall motion abnormalities noted.     Right Ventricle  The right ventricle is mildly dilated. The right ventricular systolic function  is normal.     Atria  Normal left atrial size. The left atrium is moderate to severely dilated.  Right atrium not well visualized.     Mitral Valve  There is moderate to severe mitral annular calcification. There is moderate  (2+) mitral regurgitation. There is no mitral valve stenosis.     Tricuspid Valve  Tricuspid valve leaflets appear normal. There is mild to moderate (1-2+)  tricuspid regurgitation. The right ventricular systolic pressure is  approximated at 33.1 mmHg plus the right atrial pressure.     Aortic Valve  The aortic valve is trileaflet with aortic valve sclerosis. There is  moderate  (2+) aortic regurgitation. The mean AoV pressure gradient is 10.8 mmHg. Mild  valvular aortic stenosis.     Pulmonic Valve  The pulmonic valve is not well visualized. There is trace pulmonic valvular  regurgitation.     Vessels  The aorta root is normal.     Pericardium  There is no pericardial effusion.     Rhythm  Sinus rhythm was noted.  ______________________________________________________________________________  MMode/2D Measurements & Calculations  IVSd: 0.55 cm     LVIDd: 4.9 cm  LVIDs: 2.3 cm  LVPWd: 0.52 cm  FS: 52.2 %  LV mass(C)d: 80.0 grams  LV mass(C)dI: 50.4 grams/m2  Ao root diam: 2.7 cm  LA dimension: 4.2 cm  asc Aorta Diam: 3.4 cm  LA/Ao: 1.5  LVOT diam: 1.9 cm  LVOT area: 2.9 cm2  Ao root diam index Ht(cm/m): 1.7  Ao root diam index BSA (cm/m2): 1.7  Asc Ao diam index BSA (cm/m2): 2.1  Asc Ao diam index Ht(cm/m): 2.1  EF Biplane: 69.2 %     LA Volume Indexed (AL/bp): 46.0 ml/m2  RV Base: 3.7 cm  RWT: 0.21  TAPSE: 1.3 cm     Time Measurements  MM HR: 94.0 BPM     Doppler Measurements & Calculations  MV E max tiago: 130.0 cm/sec  MV A max tiago: 53.4 cm/sec  MV E/A: 2.4  MV max P.4 mmHg  MV mean P.4 mmHg  MV V2 VTI: 31.0 cm  MVA(VTI): 1.9 cm2  MV dec slope: 683.1 cm/sec2  MV dec time: 0.19 sec  Ao V2 max: 192.4 cm/sec  Ao max PG: 15.0 mmHg  Ao V2 mean: 156.2 cm/sec  Ao mean PG: 10.8 mmHg  Ao V2 VTI: 46.7 cm  KETTY(I,D): 1.3 cm2  KETTY(V,D): 1.3 cm2  AI P1/2t: 308.4 msec  LV V1 max PG: 3.1 mmHg  LV V1 max: 88.6 cm/sec  LV V1 VTI: 20.5 cm     SV(LVOT): 58.6 ml  SI(LVOT): 36.9 ml/m2  PA acc time: 0.07 sec  TR max tiago: 287.8 cm/sec  TR max P.1 mmHg  AV Tiago Ratio (DI): 0.46  KETTY Index (cm2/m2): 0.79  E/E' av.3  Lateral E/e': 13.2  Medial E/e': 21.3  RV S Tiago: 12.2 cm/sec     ______________________________________________________________________________  Report approved by: Heri Lisa on 2025 09:12 AM             Discharge Medications   Current Discharge Medication List         CONTINUE these medications which have NOT CHANGED    Details   !! acetaminophen (TYLENOL) 500 MG tablet Take 1,000 mg by mouth 2 times daily.      !! acetaminophen (TYLENOL) 500 MG tablet Take 1,000 mg by mouth 3 times daily as needed for mild pain.      alendronate (FOSAMAX) 70 MG tablet Take 70 mg by mouth every 7 days.      busPIRone (BUSPAR) 10 MG tablet Take 10 mg by mouth 2 times daily.      cholecalciferol (VITAMIN D3) 25 mcg (1000 units) capsule Take 1 capsule by mouth daily.      estradiol (ESTRACE) 0.1 MG/GM vaginal cream Place vaginally three times a week. MWF at bedtime (self-administered by patient)      fexofenadine (ALLEGRA) 180 MG tablet Take 180 mg by mouth daily as needed for allergies.      fluticasone (FLONASE) 50 MCG/ACT nasal spray Spray 2 sprays into both nostrils daily as needed for allergies.      furosemide (LASIX) 20 MG tablet Take 3 tablets (60 mg) by mouth daily.    Associated Diagnoses: Acute on chronic diastolic congestive heart failure (H)      gabapentin (NEURONTIN) 100 MG capsule Take 100 mg by mouth 2 times daily.      ketoconazole (NIZORAL) 2 % external cream Apply topically 2 times daily as needed for itching.      !! loperamide (IMODIUM) 2 MG capsule Take 2 mg by mouth every morning.      !! loperamide (IMODIUM) 2 MG capsule Take 2 mg by mouth 3 times daily as needed for diarrhea      mirtazapine (REMERON) 7.5 MG tablet Take 7.5 mg by mouth at bedtime.      nystatin (MYCOSTATIN) 538924 UNIT/GM external powder Apply topically 2 times daily as needed for dry skin.      polyethylene glycol-propylene glycol (SYSTANE ULTRA) 0.4-0.3 % SOLN ophthalmic solution Place 2 drops into both eyes 2 times daily.      potassium chloride ER (K-TAB/KLOR-CON) 10 MEQ CR tablet Take 10 mEq by mouth 2 times daily.      sertraline (ZOLOFT) 100 MG tablet Take 150 mg by mouth daily.      zinc oxide (DESITIN) 20 % external ointment Apply topically 2 times daily as needed for dry skin or irritation  (coccyx).       !! - Potential duplicate medications found. Please discuss with provider.        Allergies   Allergies   Allergen Reactions    Codeine Nausea    Hydrocodone      Other reaction(s): GI Upset  nausea    Iodinated Contrast Media     Levofloxacin      Other reaction(s): Intolerance-Can't Take  Made leg pain worse.    Lisinopril Cough    Morphine Nausea and Vomiting     Other reaction(s): Nausea/Vomiting    Sulfa Antibiotics Nausea    Penicillins Rash     Tolerated zosyn 4/2/2025

## 2025-04-07 NOTE — PLAN OF CARE
Problem: Diarrhea  Goal: Effective Diarrhea Management  Outcome: Progressing  Intervention: Manage Diarrhea  Recent Flowsheet Documentation  Taken 4/7/2025 0025 by Tessa Vu RN  Medication Review/Management: medications reviewed     Problem: Pain Acute  Goal: Optimal Pain Control and Function  Outcome: Progressing  Intervention: Prevent or Manage Pain  Recent Flowsheet Documentation  Taken 4/7/2025 0025 by Tessa Vu RN  Medication Review/Management: medications reviewed     Problem: Mobility Impairment  Goal: Optimal Mobility  Outcome: Progressing  Intervention: Optimize Mobility  Recent Flowsheet Documentation  Taken 4/7/2025 0025 by Tessa Vu RN  Activity Management: activity adjusted per tolerance   Goal Outcome Evaluation:  Pt is A0x4, VSS, denies pain. Plans for discharge to TCU today. Bladder scan 615, Straight cath amount, 600. Call light within reach, Bed alarm on for safety.

## 2025-04-07 NOTE — PROGRESS NOTES
Physical Therapy Discharge Summary    Reason for therapy discharge:    Discharged to transitional care facility.    Progress towards therapy goal(s). See goals on Care Plan in [facility] electronic health record for goal details.  Goals not met.  Barriers to achieving goals:   discharge from facility.    Therapy recommendation(s):    Continued therapy is recommended.  Rationale/Recommendations:  TCU to progress functional mobility.

## 2025-04-07 NOTE — PROGRESS NOTES
Occupational Therapy Discharge Summary    Reason for therapy discharge:    Discharged to transitional care facility.    Progress towards therapy goal(s). See goals on Care Plan in [facility] electronic health record for goal details.  Goals not met.  Barriers to achieving goals:   discharge from facility.    Therapy recommendation(s):    Continued therapy is recommended.  Rationale/Recommendations:  At TCU to progress ind and safety.

## 2025-04-07 NOTE — PLAN OF CARE
"Goal Outcome Evaluation:                        Problem: Adult Inpatient Plan of Care  Goal: Plan of Care Review  Description: The Plan of Care Review/Shift note should be completed every shift.  The Outcome Evaluation is a brief statement about your assessment that the patient is improving, declining, or no change.  This information will be displayed automatically on your shiftnote.  4/7/2025 1458 by Arlyn Carson RN  Outcome: Adequate for Care Transition  4/7/2025 1458 by Arlyn Carson RN  Outcome: Progressing  Goal: Patient-Specific Goal (Individualized)  Description: You can add care plan individualizations to a care plan. Examples of Individualization might be:  \"Parent requests to be called daily at 9am for status\", \"I have a hard time hearing out of my right ear\", or \"Do not touch me to wake me up as it startlesme\".  4/7/2025 1458 by Arlyn Carson RN  Outcome: Adequate for Care Transition  4/7/2025 1458 by Arlyn Carson RN  Outcome: Progressing  Goal: Absence of Hospital-Acquired Illness or Injury  4/7/2025 1458 by Arlyn Carson RN  Outcome: Adequate for Care Transition  4/7/2025 1458 by Arlyn Carson RN  Outcome: Progressing  Intervention: Identify and Manage Fall Risk  Recent Flowsheet Documentation  Taken 4/7/2025 0830 by Arlyn Carson RN  Safety Promotion/Fall Prevention:   activity supervised   assistive device/personal items within reach   mobility aid in reach   nonskid shoes/slippers when out of bed   safety round/check completed   lighting adjusted   clutter free environment maintained  Intervention: Prevent Skin Injury  Recent Flowsheet Documentation  Taken 4/7/2025 0927 by Arlyn Carson RN  Body Position:   right   turned  Taken 4/7/2025 0830 by Arlyn Carson RN  Body Position:   turned   heels elevated  Intervention: Prevent Infection  Recent Flowsheet Documentation  Taken 4/7/2025 0830 by Arlyn Carson RN  Infection " Prevention:   hand hygiene promoted   rest/sleep promoted   single patient room provided  Goal: Optimal Comfort and Wellbeing  4/7/2025 1458 by Arlyn Carson, RN  Outcome: Adequate for Care Transition  4/7/2025 1458 by Arlyn Carson, RN  Outcome: Progressing  Intervention: Provide Person-Centered Care  Recent Flowsheet Documentation  Taken 4/7/2025 0830 by Arlyn Carson, RN  Trust Relationship/Rapport:   care explained   choices provided   questions answered   questions encouraged   emotional support provided  Goal: Readiness for Transition of Care  4/7/2025 1458 by Arlyn Carson, RN  Outcome: Adequate for Care Transition  4/7/2025 1458 by Arlyn Carson, RN  Outcome: Progressing   Pt is alert and oriented x 4-is afebrile-O2 on at 2L and sats are 91-95. No cough noted. Pt tolerated easy to chew diet with thin liquids. Able to feed self. Anti diarrheal given -pt had 2 bms-has hx of irritable bowel Voided large amts x2. Mepilex in place to small abrasion on coccyx. Pt turned side to side. Will be tranferring to [facility] this afternoon. Daughterat bedside.

## 2025-04-07 NOTE — PROGRESS NOTES
Care Management Discharge Note    Discharge Date: 04/07/2025       Discharge Disposition:  TCU    Discharge Services:      Discharge DME:      Discharge Transportation: medical ride     Private pay costs discussed: transportation costs    Does the patient's insurance plan have a 3 day qualifying hospital stay waiver?  Yes     Which insurance plan 3 day waiver is available? Alternative insurance waiver    Will the waiver be used for post-acute placement? No    PAS Confirmation Code: [id number]  Patient/family educated on Medicare website which has current facility and service quality ratings:      Education Provided on the Discharge Plan:    Persons Notified of Discharge Plans: Carmella/daughter   Patient/Family in Agreement with the Plan:      Handoff Referral Completed: No, handoff not indicated or clinically appropriate    Additional Information:The patient was accepted to TCU [facility], and they can admit her today. The family agreed with the patient discharging there and requested a wheelchair ride.    Discharge Disposition   Destination: TCU [facility]  Transportation: [facility] via wheelchair. Pick-up window is 15:36 - 16:24  on 4/7/25  PAS completed        FLORENCE Mathias

## 2025-04-08 ENCOUNTER — PATIENT OUTREACH (OUTPATIENT)
Dept: CARE COORDINATION | Facility: CLINIC | Age: OVER 89
End: 2025-04-08
Payer: MEDICARE

## 2025-04-08 ENCOUNTER — DOCUMENTATION ONLY (OUTPATIENT)
Dept: GERIATRICS | Facility: CLINIC | Age: OVER 89
End: 2025-04-08
Payer: MEDICARE

## 2025-04-08 VITALS
BODY MASS INDEX: 23.39 KG/M2 | TEMPERATURE: 99.2 F | RESPIRATION RATE: 18 BRPM | SYSTOLIC BLOOD PRESSURE: 114 MMHG | HEIGHT: 63 IN | DIASTOLIC BLOOD PRESSURE: 54 MMHG | HEART RATE: 91 BPM | WEIGHT: 132 LBS | OXYGEN SATURATION: 95 %

## 2025-04-08 PROBLEM — S06.5XAA SUBDURAL HEMATOMA (H): Status: ACTIVE | Noted: 2022-09-15

## 2025-04-08 NOTE — PROGRESS NOTES
[facility]    Background: Transitional Care Management program identified per system criteria and reviewed by [facility] team for possible outreach.    Assessment: Upon chart review, [facility] Team member will not proceed with patient outreach related to this episode of Transitional Care Management program due to reason below:    Non-[facility] TCU: CCRC team member noted patient discharged to TCU/ARU/LTACH. Patient is not established with a [facility] currently supported by Primary Care-Care Coordination therefore handoff to Primary Care-Care Coordination is not appropriate at this time.    Plan: Transitional Care Management episode addressed appropriately per reason noted above.        NELSON López  [phone]  [facility]

## 2025-04-09 ENCOUNTER — TRANSITIONAL CARE UNIT VISIT (OUTPATIENT)
Dept: GERIATRICS | Facility: CLINIC | Age: OVER 89
End: 2025-04-09
Payer: MEDICARE

## 2025-04-09 DIAGNOSIS — I10 PRIMARY HYPERTENSION: ICD-10-CM

## 2025-04-09 DIAGNOSIS — J96.21 ACUTE AND CHRONIC RESPIRATORY FAILURE WITH HYPOXIA (H): Primary | ICD-10-CM

## 2025-04-09 DIAGNOSIS — K58.0 IRRITABLE BOWEL SYNDROME WITH DIARRHEA: ICD-10-CM

## 2025-04-09 DIAGNOSIS — I50.33 ACUTE ON CHRONIC DIASTOLIC CONGESTIVE HEART FAILURE (H): ICD-10-CM

## 2025-04-09 DIAGNOSIS — J15.69 PNEUMONIA OF LEFT LOWER LOBE DUE TO OTHER AEROBIC GRAM-NEGATIVE BACTERIA (H): ICD-10-CM

## 2025-04-09 DIAGNOSIS — K52.9 CHRONIC DIARRHEA: ICD-10-CM

## 2025-04-09 DIAGNOSIS — J96.01 ACUTE RESPIRATORY FAILURE WITH HYPOXIA (H): ICD-10-CM

## 2025-04-09 DIAGNOSIS — E11.22 TYPE 2 DIABETES MELLITUS WITH DIABETIC CHRONIC KIDNEY DISEASE, UNSPECIFIED CKD STAGE, UNSPECIFIED WHETHER LONG TERM INSULIN USE (H): ICD-10-CM

## 2025-04-09 DIAGNOSIS — S31.000A WOUND OF SACRAL REGION, INITIAL ENCOUNTER: ICD-10-CM

## 2025-04-09 DIAGNOSIS — I48.0 PAROXYSMAL ATRIAL FIBRILLATION (H): ICD-10-CM

## 2025-04-09 DIAGNOSIS — R09.02 HYPOXIA: ICD-10-CM

## 2025-04-09 DIAGNOSIS — I50.9 CONGESTIVE HEART FAILURE, UNSPECIFIED HF CHRONICITY, UNSPECIFIED HEART FAILURE TYPE (H): ICD-10-CM

## 2025-04-09 DIAGNOSIS — J47.0 BRONCHIECTASIS WITH ACUTE LOWER RESPIRATORY INFECTION (H): ICD-10-CM

## 2025-04-09 DIAGNOSIS — R13.10 DYSPHAGIA, UNSPECIFIED TYPE: ICD-10-CM

## 2025-04-09 DIAGNOSIS — J84.9 ILD (INTERSTITIAL LUNG DISEASE) (H): ICD-10-CM

## 2025-04-09 DIAGNOSIS — N18.30 STAGE 3 CHRONIC KIDNEY DISEASE, UNSPECIFIED WHETHER STAGE 3A OR 3B CKD (H): ICD-10-CM

## 2025-04-09 PROCEDURE — 99310 SBSQ NF CARE HIGH MDM 45: CPT | Performed by: NURSE PRACTITIONER

## 2025-04-09 NOTE — PROGRESS NOTES
[facility] GERIATRICS    PRIMARY CARE PROVIDER AND CLINIC:  Joy López MD, [facility] PHYSICIANS SERVICES [address] / STILL  Chief Complaint   Patient presents with    Hospital F/U      [place] Medical Record Number:  [id number]  Place of Service where encounter took place:  [facility] ([facility]) [[id number]]    Maranda Downing  is a 89 year old  (1935), admitted to the above facility from  [facility]. Hospital stay 4/2 through 4/7/25.      Patient seen today for initial NP visit in TCU:    1. Acute and chronic respiratory failure with hypoxia (H)    2. Acute on chronic diastolic congestive heart failure (H)    3. Bronchiectasis with acute lower respiratory infection (H)    4. Paroxysmal atrial fibrillation (H)    5. Type 2 diabetes mellitus with diabetic chronic kidney disease, unspecified CKD stage, unspecified whether long term insulin use (H)    6. Stage 3 chronic kidney disease, unspecified whether stage 3a or 3b CKD (H)    7. Chronic diarrhea    8. Irritable bowel syndrome with diarrhea    9. Primary hypertension    10. Hypoxia    11. Acute respiratory failure with hypoxia (H)    12. ILD (interstitial lung disease) (H)    13. Congestive heart failure, unspecified HF chronicity, unspecified heart failure type (H)    14. Dysphagia, unspecified type    15. Pneumonia of left lower lobe due to other aerobic gram-negative bacteria (H)    16. Wound of sacral region, initial encounter          HPI:    Doing okay overall, continues on oxygen. She will follow up with Pulmonology. Last dose of Augmentin and Prednisone today. Denies HA, chest pain, palpitations, dizziness. HR controlled. BP stable so far in TCU. No troubles breathing, no SOB, no Concerns with urination, no constipation. Has some chronic diarrhea, PRN Imodium. Eating and drinking okay. Sleeping okay. Reports tenderness/pain on chronic sacral wounds, currently dressed. Has wound care  ordered   Lives at apartment, AL alone, ambulates with walker.        CODE STATUS/ADVANCE DIRECTIVES DISCUSSION:  No CPR- Do NOT Intubate    ALLERGIES:   Allergies   Allergen Reactions    Codeine Nausea    Hydrocodone      Other reaction(s): GI Upset  nausea    Iodinated Contrast Media     Levofloxacin      Other reaction(s): Intolerance-Can't Take  Made leg pain worse.    Lisinopril Cough    Morphine Nausea and Vomiting     Other reaction(s): Nausea/Vomiting    Sulfa Antibiotics Nausea    Penicillins Rash     Tolerated zosyn 4/2/2025      PAST MEDICAL HISTORY: History reviewed. No pertinent past medical history.   PAST SURGICAL HISTORY:   has no past surgical history on file.  FAMILY HISTORY: family history is not on file.  SOCIAL HISTORY:   reports that she has an unknown smoking status. She has never used smokeless tobacco.  Patient's living condition: lives alone    Post Discharge Medication Reconciliation Status:   MED REC REQUIRED  Post Medication Reconciliation Status: discharge medications reconciled, continue medications without change       Current Outpatient Medications   Medication Sig Dispense Refill    acetaminophen (TYLENOL) 500 MG tablet Take 1,000 mg by mouth 2 times daily.      acetaminophen (TYLENOL) 500 MG tablet Take 1,000 mg by mouth 3 times daily as needed for mild pain.      alendronate (FOSAMAX) 70 MG tablet Take 70 mg by mouth every 7 days.      amoxicillin-clavulanate (AUGMENTIN) 500-125 MG tablet Take 1 tablet by mouth every 12 hours. 4 tablet 0    busPIRone (BUSPAR) 10 MG tablet Take 10 mg by mouth 2 times daily.      cholecalciferol (VITAMIN D3) 25 mcg (1000 units) capsule Take 1 capsule by mouth daily.      estradiol (ESTRACE) 0.1 MG/GM vaginal cream Place vaginally three times a week. MWF at bedtime (self-administered by patient)      fexofenadine (ALLEGRA) 180 MG tablet Take 180 mg by mouth daily as needed for allergies.      fluticasone (FLONASE) 50 MCG/ACT nasal spray Spray 2  "sprays into both nostrils daily as needed for allergies.      furosemide (LASIX) 20 MG tablet Take 3 tablets (60 mg) by mouth daily.      gabapentin (NEURONTIN) 100 MG capsule Take 100 mg by mouth 2 times daily.      ketoconazole (NIZORAL) 2 % external cream Apply topically 2 times daily as needed for itching.      loperamide (IMODIUM) 2 MG capsule Take 2 mg by mouth every morning.      loperamide (IMODIUM) 2 MG capsule Take 2 mg by mouth 3 times daily as needed for diarrhea      mirtazapine (REMERON) 7.5 MG tablet Take 7.5 mg by mouth at bedtime.      nystatin (MYCOSTATIN) 348207 UNIT/GM external powder Apply topically 2 times daily as needed for dry skin.      polyethylene glycol-propylene glycol (SYSTANE ULTRA) 0.4-0.3 % SOLN ophthalmic solution Place 2 drops into both eyes 2 times daily.      potassium chloride ER (K-TAB/KLOR-CON) 10 MEQ CR tablet Take 10 mEq by mouth 2 times daily.      predniSONE (DELTASONE) 20 MG tablet Take 2 tablets (40 mg) by mouth daily for 2 days. 4 tablet 0    sertraline (ZOLOFT) 100 MG tablet Take 150 mg by mouth daily.      zinc oxide (DESITIN) 20 % external ointment Apply topically 2 times daily as needed for dry skin or irritation (coccyx).       No current facility-administered medications for this visit.       ROS:  10 point ROS of systems including Constitutional, Eyes, Respiratory, Cardiovascular, Gastroenterology, Genitourinary, Integumentary, Musculoskeletal, Psychiatric were all negative except for pertinent positives noted in my HPI.    Vitals:  /54   Pulse 91   Temp 99.2  F (37.3  C)   Resp 18   Ht 1.6 m (5' 3\")   Wt 59.9 kg (132 lb)   SpO2 95%   BMI 23.38 kg/m    Exam:  GENERAL APPEARANCE:  Alert, in no distress, oriented, cooperative. Laying in bed  ENT:  Mouth and posterior oropharynx normal, moist mucous membranes  EYES:  EOM, conjunctivae, lids, pupils and irises normal  NECK:  No adenopathy,masses or thyromegaly  RESP:  respiratory effort and palpation of " "chest normal, lungs clear to auscultation , no respiratory distress. Wearing oxygen  CV:  Palpation and auscultation of heart done , regular rate and rhythm, no murmur, rub, or gallop, no edema to LE  GI/ABDOMEN:  normal bowel sounds, soft, nontender, no hepatosplenomegaly or other masses  M/S:   moves extremities spontaneously. Ambulation not tested   SKIN:  no rashes to exposed skin. Unable to visualize sacral wounds  NEURO:   intact   PSYCH:  oriented X 3, normal insight, judgement and memory, affect and mood normal,       Lab/Diagnostic data:  Recent labs in [facility] reviewed by me today.       Last Comprehensive Metabolic Panel:  Lab Results   Component Value Date     04/07/2025    POTASSIUM 3.4 04/07/2025    CHLORIDE 101 04/07/2025    CO2 35 (H) 04/07/2025    ANIONGAP 7 04/07/2025     (H) 04/07/2025    BUN 30.4 (H) 04/07/2025    CR 0.90 04/07/2025    GFRESTIMATED 60 (L) 04/07/2025    HETAL 8.7 (L) 04/07/2025       Lab Results   Component Value Date    WBC 7.0 04/07/2025     Lab Results   Component Value Date    RBC 3.57 04/07/2025     Lab Results   Component Value Date    HGB 10.7 04/07/2025     Lab Results   Component Value Date    HCT 34.1 04/07/2025     No components found for: \"MCT\"  Lab Results   Component Value Date    MCV 96 04/07/2025     Lab Results   Component Value Date    MCH 30.0 04/07/2025     Lab Results   Component Value Date    MCHC 31.4 04/07/2025     Lab Results   Component Value Date    RDW 12.3 04/07/2025     Lab Results   Component Value Date     04/07/2025         ASSESSMENT/PLAN:    Acute Hypoxic Respiratory Failure, improved  Pneumonia and pneumonitis, resolved  ILD  Pulmonary HTN  Admitted for worsening hypoxia for 4 days with nonproductive cough. Requiring 4-5L NC on admission. CXR during previous admission with significant interstitial opacities B/L, unclear if underlying pre-existing ILD vs acute changes. Discharged on 2L NC a week prior to this admission. CT on " admission with worsening groundglass opacities and fibrosis, along with pneumonitis. Pulmonary HTN seen as well. Rales diffusely on lung exam though related to likely interstitial lung disease. There was initially some concern for chronic aspiration, but patient passed SLP assessment including video swallow study. Patient has been vitally normal, no leukocytosis, procal normal reassuring against new acute infection. Pulmonology was consulted. Treating as pneumonia and pneumonitis with antibiotics and steroids.   - Continue with Augmentin, last dose 4/9  - 40 mg prednisone daily, last dose 4/9  - Follow up in our pulmonary clinic with CT non-contrast Chest and PFTs in about 4 weeks (pulm helping arrange)  - Passed swallow study and video swallow study. No further concerns for dysphagia. Patient is feeding herself.  - Home O2 assessment complete, O2 sats 84% while awake at rest. Requiring 3L at rest. Has home O2 supplies already. Continue O2.   -PT/OT     HFpEF  Paroxysmal Atrial Fibrillation  Elevated troponin  Afib noted on chart review, rate controlled in TCU  Not on anticoagulation with history of recurrent bleeding.   Echocardiogram 2021 with LVEF 55-60%.   Monitor   Continue lasix  Weight today 138     Chronic T10, T12, L1, L3 Fractures  Noted on CXR, known to patient.  stable.   PT,OT     T2DM  Not on PTA meds  Monitor     Sacral Wound  Reports chronic sacral wounds. They are dressed. Painful.   WOC team  follow/continue dressing orders.  Offload pressure     Depression/Anxiety   PTA sertraline, buspar, mirtazapine    Chronic diarrhea   PTA PRN loperamide    HTN  not on PTA meds    /54       Electronically signed by:  Juana Cortez CNP         Total time greater than 45 minutes reviewing chart in EPIC and PCC, medications, labs, vitals. Discussing with social work, physical therapy, occupational therapy and nursing.

## 2025-04-09 NOTE — LETTER
4/9/2025      Maranda Downing  [address]  [place] [address]        [facility] GERIATRICS    PRIMARY CARE PROVIDER AND CLINIC:  Joy López MD, [facility] PHYSICIANS SERVICES [address] / [place]  Chief Complaint   Patient presents with     Hospital F/U      [place] Medical Record Number:  [id number]  Place of Service where encounter took place:  [facility] () [[id number]]    Maranda Downing  is a 89 year old  (1935), admitted to the above facility from  [facility]. Hospital stay 4/2 through 4/7/25.      Patient seen today for initial NP visit in TCU:    1. Acute and chronic respiratory failure with hypoxia (H)    2. Acute on chronic diastolic congestive heart failure (H)    3. Bronchiectasis with acute lower respiratory infection (H)    4. Paroxysmal atrial fibrillation (H)    5. Type 2 diabetes mellitus with diabetic chronic kidney disease, unspecified CKD stage, unspecified whether long term insulin use (H)    6. Stage 3 chronic kidney disease, unspecified whether stage 3a or 3b CKD (H)    7. Chronic diarrhea    8. Irritable bowel syndrome with diarrhea    9. Primary hypertension    10. Hypoxia    11. Acute respiratory failure with hypoxia (H)    12. ILD (interstitial lung disease) (H)    13. Congestive heart failure, unspecified HF chronicity, unspecified heart failure type (H)    14. Dysphagia, unspecified type    15. Pneumonia of left lower lobe due to other aerobic gram-negative bacteria (H)    16. Wound of sacral region, initial encounter          HPI:    Doing okay overall, continues on oxygen. She will follow up with Pulmonology. Last dose of Augmentin and Prednisone today. Denies HA, chest pain, palpitations, dizziness. HR controlled. BP stable so far in TCU. No troubles breathing, no SOB, no Concerns with urination, no constipation. Has some chronic diarrhea, PRN Imodium. Eating and drinking okay. Sleeping okay.  Reports tenderness/pain on chronic sacral wounds, currently dressed. Has wound care ordered   Lives at apartment, AL alone, ambulates with walker.        CODE STATUS/ADVANCE DIRECTIVES DISCUSSION:  No CPR- Do NOT Intubate    ALLERGIES:   Allergies   Allergen Reactions     Codeine Nausea     Hydrocodone      Other reaction(s): GI Upset  nausea     Iodinated Contrast Media      Levofloxacin      Other reaction(s): Intolerance-Can't Take  Made leg pain worse.     Lisinopril Cough     Morphine Nausea and Vomiting     Other reaction(s): Nausea/Vomiting     Sulfa Antibiotics Nausea     Penicillins Rash     Tolerated zosyn 4/2/2025      PAST MEDICAL HISTORY: History reviewed. No pertinent past medical history.   PAST SURGICAL HISTORY:   has no past surgical history on file.  FAMILY HISTORY: family history is not on file.  SOCIAL HISTORY:   reports that she has an unknown smoking status. She has never used smokeless tobacco.  Patient's living condition: lives alone    Post Discharge Medication Reconciliation Status:   MED REC REQUIRED  Post Medication Reconciliation Status: discharge medications reconciled, continue medications without change       Current Outpatient Medications   Medication Sig Dispense Refill     acetaminophen (TYLENOL) 500 MG tablet Take 1,000 mg by mouth 2 times daily.       acetaminophen (TYLENOL) 500 MG tablet Take 1,000 mg by mouth 3 times daily as needed for mild pain.       alendronate (FOSAMAX) 70 MG tablet Take 70 mg by mouth every 7 days.       amoxicillin-clavulanate (AUGMENTIN) 500-125 MG tablet Take 1 tablet by mouth every 12 hours. 4 tablet 0     busPIRone (BUSPAR) 10 MG tablet Take 10 mg by mouth 2 times daily.       cholecalciferol (VITAMIN D3) 25 mcg (1000 units) capsule Take 1 capsule by mouth daily.       estradiol (ESTRACE) 0.1 MG/GM vaginal cream Place vaginally three times a week. MWF at bedtime (self-administered by patient)       fexofenadine (ALLEGRA) 180 MG tablet Take 180 mg by  "mouth daily as needed for allergies.       fluticasone (FLONASE) 50 MCG/ACT nasal spray Spray 2 sprays into both nostrils daily as needed for allergies.       furosemide (LASIX) 20 MG tablet Take 3 tablets (60 mg) by mouth daily.       gabapentin (NEURONTIN) 100 MG capsule Take 100 mg by mouth 2 times daily.       ketoconazole (NIZORAL) 2 % external cream Apply topically 2 times daily as needed for itching.       loperamide (IMODIUM) 2 MG capsule Take 2 mg by mouth every morning.       loperamide (IMODIUM) 2 MG capsule Take 2 mg by mouth 3 times daily as needed for diarrhea       mirtazapine (REMERON) 7.5 MG tablet Take 7.5 mg by mouth at bedtime.       nystatin (MYCOSTATIN) 077548 UNIT/GM external powder Apply topically 2 times daily as needed for dry skin.       polyethylene glycol-propylene glycol (SYSTANE ULTRA) 0.4-0.3 % SOLN ophthalmic solution Place 2 drops into both eyes 2 times daily.       potassium chloride ER (K-TAB/KLOR-CON) 10 MEQ CR tablet Take 10 mEq by mouth 2 times daily.       predniSONE (DELTASONE) 20 MG tablet Take 2 tablets (40 mg) by mouth daily for 2 days. 4 tablet 0     sertraline (ZOLOFT) 100 MG tablet Take 150 mg by mouth daily.       zinc oxide (DESITIN) 20 % external ointment Apply topically 2 times daily as needed for dry skin or irritation (coccyx).       No current facility-administered medications for this visit.       ROS:  10 point ROS of systems including Constitutional, Eyes, Respiratory, Cardiovascular, Gastroenterology, Genitourinary, Integumentary, Musculoskeletal, Psychiatric were all negative except for pertinent positives noted in my HPI.    Vitals:  /54   Pulse 91   Temp 99.2  F (37.3  C)   Resp 18   Ht 1.6 m (5' 3\")   Wt 59.9 kg (132 lb)   SpO2 95%   BMI 23.38 kg/m    Exam:  GENERAL APPEARANCE:  Alert, in no distress, oriented, cooperative. Laying in bed  ENT:  Mouth and posterior oropharynx normal, moist mucous membranes  EYES:  EOM, conjunctivae, lids, " "pupils and irises normal  NECK:  No adenopathy,masses or thyromegaly  RESP:  respiratory effort and palpation of chest normal, lungs clear to auscultation , no respiratory distress. Wearing oxygen  CV:  Palpation and auscultation of heart done , regular rate and rhythm, no murmur, rub, or gallop, no edema to LE  GI/ABDOMEN:  normal bowel sounds, soft, nontender, no hepatosplenomegaly or other masses  M/S:   moves extremities spontaneously. Ambulation not tested   SKIN:  no rashes to exposed skin. Unable to visualize sacral wounds  NEURO:   intact   PSYCH:  oriented X 3, normal insight, judgement and memory, affect and mood normal,       Lab/Diagnostic data:  Recent labs in [facility] reviewed by me today.       Last Comprehensive Metabolic Panel:  Lab Results   Component Value Date     04/07/2025    POTASSIUM 3.4 04/07/2025    CHLORIDE 101 04/07/2025    CO2 35 (H) 04/07/2025    ANIONGAP 7 04/07/2025     (H) 04/07/2025    BUN 30.4 (H) 04/07/2025    CR 0.90 04/07/2025    GFRESTIMATED 60 (L) 04/07/2025    HETAL 8.7 (L) 04/07/2025       Lab Results   Component Value Date    WBC 7.0 04/07/2025     Lab Results   Component Value Date    RBC 3.57 04/07/2025     Lab Results   Component Value Date    HGB 10.7 04/07/2025     Lab Results   Component Value Date    HCT 34.1 04/07/2025     No components found for: \"MCT\"  Lab Results   Component Value Date    MCV 96 04/07/2025     Lab Results   Component Value Date    MCH 30.0 04/07/2025     Lab Results   Component Value Date    MCHC 31.4 04/07/2025     Lab Results   Component Value Date    RDW 12.3 04/07/2025     Lab Results   Component Value Date     04/07/2025         ASSESSMENT/PLAN:    Acute Hypoxic Respiratory Failure, improved  Pneumonia and pneumonitis, resolved  ILD  Pulmonary HTN  Admitted for worsening hypoxia for 4 days with nonproductive cough. Requiring 4-5L NC on admission. CXR during previous admission with significant interstitial opacities B/L, unclear " if underlying pre-existing ILD vs acute changes. Discharged on 2L NC a week prior to this admission. CT on admission with worsening groundglass opacities and fibrosis, along with pneumonitis. Pulmonary HTN seen as well. Rales diffusely on lung exam though related to likely interstitial lung disease. There was initially some concern for chronic aspiration, but patient passed SLP assessment including video swallow study. Patient has been vitally normal, no leukocytosis, procal normal reassuring against new acute infection. Pulmonology was consulted. Treating as pneumonia and pneumonitis with antibiotics and steroids.   - Continue with Augmentin, last dose 4/9  - 40 mg prednisone daily, last dose 4/9  - Follow up in our pulmonary clinic with CT non-contrast Chest and PFTs in about 4 weeks (pulm helping arrange)  - Passed swallow study and video swallow study. No further concerns for dysphagia. Patient is feeding herself.  - Home O2 assessment complete, O2 sats 84% while awake at rest. Requiring 3L at rest. Has home O2 supplies already. Continue O2.   -PT/OT     HFpEF  Paroxysmal Atrial Fibrillation  Elevated troponin  Afib noted on chart review, rate controlled in TCU  Not on anticoagulation with history of recurrent bleeding.   Echocardiogram 2021 with LVEF 55-60%.   Monitor   Continue lasix  Weight today 138     Chronic T10, T12, L1, L3 Fractures  Noted on CXR, known to patient.  stable.   PT,OT     T2DM  Not on PTA meds  Monitor     Sacral Wound  Reports chronic sacral wounds. They are dressed. Painful.   WOC team  follow/continue dressing orders.  Offload pressure     Depression/Anxiety   PTA sertraline, buspar, mirtazapine    Chronic diarrhea   PTA PRN loperamide    HTN  not on PTA meds    /54       Electronically signed by:  Juana Cortez CNP         Total time greater than 45 minutes reviewing chart in EPIC and PCC, medications, labs, vitals. Discussing with social work, physical therapy, occupational  therapy and nursing.                     Sincerely,        Juana Cortez CNP    Electronically signed

## 2025-04-14 ENCOUNTER — TELEPHONE (OUTPATIENT)
Dept: GERIATRICS | Facility: CLINIC | Age: OVER 89
End: 2025-04-14

## 2025-04-14 NOTE — TELEPHONE ENCOUNTER
[facility] Geriatrics Triage Call    Provider: REYNALDO Valentine CNP  Facility: [facility] Facility Type:  TCU    Caller: Maranda   Call Back Number: [phone]    Allergies:    Allergies   Allergen Reactions    Codeine Nausea    Hydrocodone      Other reaction(s): GI Upset  nausea    Iodinated Contrast Media     Levofloxacin      Other reaction(s): Intolerance-Can't Take  Made leg pain worse.    Lisinopril Cough    Morphine Nausea and Vomiting     Other reaction(s): Nausea/Vomiting    Sulfa Antibiotics Nausea    Penicillins Rash     Tolerated zosyn 4/2/2025        SBAR:     S-(situation): Day the nurse is called in behalf of the patient-the daughter brought in compression stockings and requesting that she be able to wear them as she does at home.     B-(background): Patient wears compression stockings at home    A-(assessment): Patient does have some scant edema on the lower extremities      MCS/FGS STANDING ORDER GIVEN:  May initiate ace/teds/jobst as clinically indicated   Verbal order/direction given to: JAROD Pearl RN

## 2025-04-15 ENCOUNTER — TRANSITIONAL CARE UNIT VISIT (OUTPATIENT)
Dept: GERIATRICS | Facility: CLINIC | Age: OVER 89
End: 2025-04-15
Payer: MEDICARE

## 2025-04-15 VITALS
HEIGHT: 63 IN | SYSTOLIC BLOOD PRESSURE: 116 MMHG | HEART RATE: 91 BPM | OXYGEN SATURATION: 98 % | WEIGHT: 133 LBS | DIASTOLIC BLOOD PRESSURE: 60 MMHG | TEMPERATURE: 98.7 F | BODY MASS INDEX: 23.57 KG/M2 | RESPIRATION RATE: 18 BRPM

## 2025-04-15 DIAGNOSIS — J47.0 BRONCHIECTASIS WITH ACUTE LOWER RESPIRATORY INFECTION (H): ICD-10-CM

## 2025-04-15 DIAGNOSIS — J84.9 ILD (INTERSTITIAL LUNG DISEASE) (H): ICD-10-CM

## 2025-04-15 DIAGNOSIS — N30.01 ACUTE CYSTITIS WITH HEMATURIA: ICD-10-CM

## 2025-04-15 DIAGNOSIS — K52.9 CHRONIC DIARRHEA: ICD-10-CM

## 2025-04-15 DIAGNOSIS — I50.9 CONGESTIVE HEART FAILURE, UNSPECIFIED HF CHRONICITY, UNSPECIFIED HEART FAILURE TYPE (H): ICD-10-CM

## 2025-04-15 DIAGNOSIS — S06.5XAA SUBDURAL HEMATOMA (H): Primary | ICD-10-CM

## 2025-04-15 DIAGNOSIS — R13.10 DYSPHAGIA, UNSPECIFIED TYPE: ICD-10-CM

## 2025-04-15 DIAGNOSIS — E11.22 TYPE 2 DIABETES MELLITUS WITH DIABETIC CHRONIC KIDNEY DISEASE, UNSPECIFIED CKD STAGE, UNSPECIFIED WHETHER LONG TERM INSULIN USE (H): ICD-10-CM

## 2025-04-15 DIAGNOSIS — K21.00 GASTROESOPHAGEAL REFLUX DISEASE WITH ESOPHAGITIS WITHOUT HEMORRHAGE: ICD-10-CM

## 2025-04-15 DIAGNOSIS — K58.0 IRRITABLE BOWEL SYNDROME WITH DIARRHEA: ICD-10-CM

## 2025-04-15 DIAGNOSIS — J96.01 ACUTE RESPIRATORY FAILURE WITH HYPOXIA (H): ICD-10-CM

## 2025-04-15 PROCEDURE — 99309 SBSQ NF CARE MODERATE MDM 30: CPT | Performed by: NURSE PRACTITIONER

## 2025-04-15 NOTE — LETTER
4/15/2025      Maranda Downing  [address]  [facility] [address]        [facility] GERIATRICS    PRIMARY CARE PROVIDER AND CLINIC:  Joy López MD, [facility] SERVICES [address] / STILL  Chief Complaint   Patient presents with     RECHECK      [place] Medical Record Number:  [id number]  Place of Service where encounter took place:  [facility] ([facility]) [[id number]]    Maranda Downing  is a 89 year old  (1935), admitted to the above facility from  [facility]. Hospital stay 4/2 through 4/7/25.      Patient seen today for follow up NP visit in TCU:    1. Subdural hematoma (H)    2. Acute cystitis with hematuria    3. Acute respiratory failure with hypoxia (H)    4. Gastroesophageal reflux disease with esophagitis without hemorrhage    5. Dysphagia, unspecified type    6. ILD (interstitial lung disease) (H)    7. Congestive heart failure, unspecified HF chronicity, unspecified heart failure type (H)    8. Bronchiectasis with acute lower respiratory infection (H)    9. Type 2 diabetes mellitus with diabetic chronic kidney disease, unspecified CKD stage, unspecified whether long term insulin use (H)    10. Chronic diarrhea    11. Irritable bowel syndrome with diarrhea          HPI:    Doing okay overall, continues on oxygen. Pulmonology appt scheduled for May.  Augmentin and Prednisone completed. Denies HA, chest pain, palpitations, dizziness. HR controlled. BP stable so far in TCU. No troubles breathing, no SOB 98% on oxygen. no Concerns with urination, no constipation. Has some chronic diarrhea, PRN Imodium. Eating and drinking okay. Sleeping okay. Reports tenderness/pain on chronic sacral wounds, currently dressed. Has wound care ordered Participating in therapies.   [facility] [place] [facility], ambulates with walker.        CODE STATUS/ADVANCE DIRECTIVES DISCUSSION:  No CPR- Do NOT Intubate    ALLERGIES:   Allergies    Allergen Reactions     Codeine Nausea     Hydrocodone      Other reaction(s): GI Upset  nausea     Iodinated Contrast Media      Levofloxacin      Other reaction(s): Intolerance-Can't Take  Made leg pain worse.     Lisinopril Cough     Morphine Nausea and Vomiting     Other reaction(s): Nausea/Vomiting     Sulfa Antibiotics Nausea     Penicillins Rash     Tolerated zosyn 4/2/2025      PAST MEDICAL HISTORY: History reviewed. No pertinent past medical history.   PAST SURGICAL HISTORY:   has no past surgical history on file.  FAMILY HISTORY: family history is not on file.  SOCIAL HISTORY:   reports that she has an unknown smoking status. She has never used smokeless tobacco.  Patient's living condition: lives alone    Post Discharge Medication Reconciliation Status:   MED REC REQUIRED  Post Medication Reconciliation Status: discharge medications reconciled, continue medications without change       Current Outpatient Medications   Medication Sig Dispense Refill     acetaminophen (TYLENOL) 500 MG tablet Take 1,000 mg by mouth 2 times daily.       acetaminophen (TYLENOL) 500 MG tablet Take 1,000 mg by mouth 3 times daily as needed for mild pain.       alendronate (FOSAMAX) 70 MG tablet Take 70 mg by mouth every 7 days.       amoxicillin-clavulanate (AUGMENTIN) 500-125 MG tablet Take 1 tablet by mouth every 12 hours. 4 tablet 0     busPIRone (BUSPAR) 10 MG tablet Take 10 mg by mouth 2 times daily.       cholecalciferol (VITAMIN D3) 25 mcg (1000 units) capsule Take 1 capsule by mouth daily.       estradiol (ESTRACE) 0.1 MG/GM vaginal cream Place vaginally three times a week. MWF at bedtime (self-administered by patient)       fexofenadine (ALLEGRA) 180 MG tablet Take 180 mg by mouth daily as needed for allergies.       fluticasone (FLONASE) 50 MCG/ACT nasal spray Spray 2 sprays into both nostrils daily as needed for allergies.       furosemide (LASIX) 20 MG tablet Take 3 tablets (60 mg) by mouth daily.       gabapentin  "(NEURONTIN) 100 MG capsule Take 100 mg by mouth 2 times daily.       ketoconazole (NIZORAL) 2 % external cream Apply topically 2 times daily as needed for itching.       loperamide (IMODIUM) 2 MG capsule Take 2 mg by mouth every morning.       loperamide (IMODIUM) 2 MG capsule Take 2 mg by mouth 3 times daily as needed for diarrhea       mirtazapine (REMERON) 7.5 MG tablet Take 7.5 mg by mouth at bedtime.       nystatin (MYCOSTATIN) 725032 UNIT/GM external powder Apply topically 2 times daily as needed for dry skin.       polyethylene glycol-propylene glycol (SYSTANE ULTRA) 0.4-0.3 % SOLN ophthalmic solution Place 2 drops into both eyes 2 times daily.       potassium chloride ER (K-TAB/KLOR-CON) 10 MEQ CR tablet Take 10 mEq by mouth 2 times daily.       sertraline (ZOLOFT) 100 MG tablet Take 150 mg by mouth daily.       zinc oxide (DESITIN) 20 % external ointment Apply topically 2 times daily as needed for dry skin or irritation (coccyx).       No current facility-administered medications for this visit.       ROS:  10 point ROS of systems including Constitutional, Eyes, Respiratory, Cardiovascular, Gastroenterology, Genitourinary, Integumentary, Musculoskeletal, Psychiatric were all negative except for pertinent positives noted in my HPI.    Vitals:  /60   Pulse 91   Temp 98.7  F (37.1  C)   Resp 18   Ht 1.6 m (5' 3\")   Wt 60.3 kg (133 lb)   SpO2 98%   BMI 23.56 kg/m    Exam:  GENERAL APPEARANCE:  Alert, in no distress, oriented, cooperative. Laying in bed  ENT:  Mouth and posterior oropharynx normal, moist mucous membranes  EYES:  EOM, conjunctivae, lids, pupils and irises normal  NECK:  No adenopathy,masses or thyromegaly  RESP:  respiratory effort and palpation of chest normal, lungs clear to auscultation , no respiratory distress. Wearing oxygen  CV:  Palpation and auscultation of heart done , regular rate and rhythm, no murmur, rub, or gallop, no edema to LE  GI/ABDOMEN:  normal bowel sounds, " "soft, nontender, no hepatosplenomegaly or other masses  M/S:   moves extremities spontaneously. Ambulation not tested   SKIN:  no rashes to exposed skin. Unable to visualize sacral wounds  NEURO:   intact   PSYCH:  oriented X 3, normal insight, judgement and memory, affect and mood normal,       Lab/Diagnostic data:  Recent labs in [facility] reviewed by me today.       Last Comprehensive Metabolic Panel:  Lab Results   Component Value Date     04/07/2025    POTASSIUM 3.4 04/07/2025    CHLORIDE 101 04/07/2025    CO2 35 (H) 04/07/2025    ANIONGAP 7 04/07/2025     (H) 04/07/2025    BUN 30.4 (H) 04/07/2025    CR 0.90 04/07/2025    GFRESTIMATED 60 (L) 04/07/2025    HETAL 8.7 (L) 04/07/2025       Lab Results   Component Value Date    WBC 7.0 04/07/2025     Lab Results   Component Value Date    RBC 3.57 04/07/2025     Lab Results   Component Value Date    HGB 10.7 04/07/2025     Lab Results   Component Value Date    HCT 34.1 04/07/2025     No components found for: \"MCT\"  Lab Results   Component Value Date    MCV 96 04/07/2025     Lab Results   Component Value Date    MCH 30.0 04/07/2025     Lab Results   Component Value Date    MCHC 31.4 04/07/2025     Lab Results   Component Value Date    RDW 12.3 04/07/2025     Lab Results   Component Value Date     04/07/2025         ASSESSMENT/PLAN:    Acute Hypoxic Respiratory Failure, improved  Pneumonia and pneumonitis, resolved  ILD  Pulmonary HTN  Admitted for worsening hypoxia for 4 days with nonproductive cough. Requiring 4-5L NC on admission. CXR during previous admission with significant interstitial opacities B/L, unclear if underlying pre-existing ILD vs acute changes. Discharged on 2L NC a week prior to this admission. CT on admission with worsening groundglass opacities and fibrosis, along with pneumonitis. Pulmonary HTN seen as well. Rales diffusely on lung exam though related to likely interstitial lung disease. There was initially some concern for chronic " aspiration, but patient passed SLP assessment including video swallow study. Patient has been vitally normal, no leukocytosis, procal normal reassuring against new acute infection. Pulmonology was consulted. Treating as pneumonia and pneumonitis with antibiotics and steroids.   - Completed Augmentin and prednisone 4/9  - Follow up pulmonary clinic with CT non-contrast Chest and PFTs-scheduled for May   - Passed swallow study and video swallow study. No further concerns for dysphagia. Patient is feeding herself.  - Home O2 assessment complete, O2 sats 84% while awake at rest. Requiring 3L at rest. Has home O2 supplies already. Continue O2.   -PT/OT     HFpEF  Paroxysmal Atrial Fibrillation  Elevated troponin  Afib noted on chart review, rate controlled in TCU  Not on anticoagulation with history of recurrent bleeding.   Echocardiogram 2021 with LVEF 55-60%.   Monitor   Continue lasix  Weight today 138>133.4 pounds     Chronic T10, T12, L1, L3 Fractures  Noted on CXR, known to patient.  stable.   PT,OT     T2DM  Not on PTA meds  Monitor     Sacral Wound  Reports chronic sacral wounds. They are dressed. Painful.   WOC team  follow/continue dressing orders.  Offload pressure     Depression/Anxiety   PTA sertraline, buspar, mirtazapine    Chronic diarrhea   PTA PRN loperamide    HTN  not on PTA meds    /54       Electronically signed by:  Juana Cortez CNP         Total time greater than 35 minutes reviewing chart in EPIC and PCC, medications, labs, vitals. Discussing with social work, physical therapy, occupational therapy and nursing.                     Sincerely,        Juana Cortez CNP    Electronically signed

## 2025-04-15 NOTE — PROGRESS NOTES
[facility] GERIATRICS    PRIMARY CARE PROVIDER AND CLINIC:  Joy López MD, [facility] PHYSICIANS SERVICES [address] / [place]  Chief Complaint   Patient presents with    RECHECK      [place] Medical Record Number:  [id number]  Place of Service where encounter took place:  [facility] ([facility]) [[id number]]    Maranda Downing  is a 89 year old  (1935), admitted to the above facility from  [facility]. Hospital stay 4/2 through 4/7/25.      Patient seen today for follow up NP visit in TCU:    1. Subdural hematoma (H)    2. Acute cystitis with hematuria    3. Acute respiratory failure with hypoxia (H)    4. Gastroesophageal reflux disease with esophagitis without hemorrhage    5. Dysphagia, unspecified type    6. ILD (interstitial lung disease) (H)    7. Congestive heart failure, unspecified HF chronicity, unspecified heart failure type (H)    8. Bronchiectasis with acute lower respiratory infection (H)    9. Type 2 diabetes mellitus with diabetic chronic kidney disease, unspecified CKD stage, unspecified whether long term insulin use (H)    10. Chronic diarrhea    11. Irritable bowel syndrome with diarrhea          HPI:    Doing okay overall, continues on oxygen. Pulmonology appt scheduled for May.  Augmentin and Prednisone completed. Denies HA, chest pain, palpitations, dizziness. HR controlled. BP stable so far in TCU. No troubles breathing, no SOB 98% on oxygen. no Concerns with urination, no constipation. Has some chronic diarrhea, PRN Imodium. Eating and drinking okay. Sleeping okay. Reports tenderness/pain on chronic sacral wounds, currently dressed. Has wound care ordered Participating in therapies.   Lives at [facility], [place] alone, ambulates with walker.        CODE STATUS/ADVANCE DIRECTIVES DISCUSSION:  No CPR- Do NOT Intubate    ALLERGIES:   Allergies   Allergen Reactions    Codeine Nausea    Hydrocodone      Other reaction(s): GI Upset  nausea     Iodinated Contrast Media     Levofloxacin      Other reaction(s): Intolerance-Can't Take  Made leg pain worse.    Lisinopril Cough    Morphine Nausea and Vomiting     Other reaction(s): Nausea/Vomiting    Sulfa Antibiotics Nausea    Penicillins Rash     Tolerated zosyn 4/2/2025      PAST MEDICAL HISTORY: History reviewed. No pertinent past medical history.   PAST SURGICAL HISTORY:   has no past surgical history on file.  FAMILY HISTORY: family history is not on file.  SOCIAL HISTORY:   reports that she has an unknown smoking status. She has never used smokeless tobacco.  Patient's living condition: lives alone    Post Discharge Medication Reconciliation Status:   MED REC REQUIRED  Post Medication Reconciliation Status: discharge medications reconciled, continue medications without change       Current Outpatient Medications   Medication Sig Dispense Refill    acetaminophen (TYLENOL) 500 MG tablet Take 1,000 mg by mouth 2 times daily.      acetaminophen (TYLENOL) 500 MG tablet Take 1,000 mg by mouth 3 times daily as needed for mild pain.      alendronate (FOSAMAX) 70 MG tablet Take 70 mg by mouth every 7 days.      amoxicillin-clavulanate (AUGMENTIN) 500-125 MG tablet Take 1 tablet by mouth every 12 hours. 4 tablet 0    busPIRone (BUSPAR) 10 MG tablet Take 10 mg by mouth 2 times daily.      cholecalciferol (VITAMIN D3) 25 mcg (1000 units) capsule Take 1 capsule by mouth daily.      estradiol (ESTRACE) 0.1 MG/GM vaginal cream Place vaginally three times a week. MWF at bedtime (self-administered by patient)      fexofenadine (ALLEGRA) 180 MG tablet Take 180 mg by mouth daily as needed for allergies.      fluticasone (FLONASE) 50 MCG/ACT nasal spray Spray 2 sprays into both nostrils daily as needed for allergies.      furosemide (LASIX) 20 MG tablet Take 3 tablets (60 mg) by mouth daily.      gabapentin (NEURONTIN) 100 MG capsule Take 100 mg by mouth 2 times daily.      ketoconazole (NIZORAL) 2 % external cream Apply  "topically 2 times daily as needed for itching.      loperamide (IMODIUM) 2 MG capsule Take 2 mg by mouth every morning.      loperamide (IMODIUM) 2 MG capsule Take 2 mg by mouth 3 times daily as needed for diarrhea      mirtazapine (REMERON) 7.5 MG tablet Take 7.5 mg by mouth at bedtime.      nystatin (MYCOSTATIN) 014114 UNIT/GM external powder Apply topically 2 times daily as needed for dry skin.      polyethylene glycol-propylene glycol (SYSTANE ULTRA) 0.4-0.3 % SOLN ophthalmic solution Place 2 drops into both eyes 2 times daily.      potassium chloride ER (K-TAB/KLOR-CON) 10 MEQ CR tablet Take 10 mEq by mouth 2 times daily.      sertraline (ZOLOFT) 100 MG tablet Take 150 mg by mouth daily.      zinc oxide (DESITIN) 20 % external ointment Apply topically 2 times daily as needed for dry skin or irritation (coccyx).       No current facility-administered medications for this visit.       ROS:  10 point ROS of systems including Constitutional, Eyes, Respiratory, Cardiovascular, Gastroenterology, Genitourinary, Integumentary, Musculoskeletal, Psychiatric were all negative except for pertinent positives noted in my HPI.    Vitals:  /60   Pulse 91   Temp 98.7  F (37.1  C)   Resp 18   Ht 1.6 m (5' 3\")   Wt 60.3 kg (133 lb)   SpO2 98%   BMI 23.56 kg/m    Exam:  GENERAL APPEARANCE:  Alert, in no distress, oriented, cooperative. Laying in bed  ENT:  Mouth and posterior oropharynx normal, moist mucous membranes  EYES:  EOM, conjunctivae, lids, pupils and irises normal  NECK:  No adenopathy,masses or thyromegaly  RESP:  respiratory effort and palpation of chest normal, lungs clear to auscultation , no respiratory distress. Wearing oxygen  CV:  Palpation and auscultation of heart done , regular rate and rhythm, no murmur, rub, or gallop, no edema to LE  GI/ABDOMEN:  normal bowel sounds, soft, nontender, no hepatosplenomegaly or other masses  M/S:   moves extremities spontaneously. Ambulation not tested   SKIN:  no " "rashes to exposed skin. Unable to visualize sacral wounds  NEURO:   intact   PSYCH:  oriented X 3, normal insight, judgement and memory, affect and mood normal,       Lab/Diagnostic data:  Recent labs in [facility] reviewed by me today.       Last Comprehensive Metabolic Panel:  Lab Results   Component Value Date     04/07/2025    POTASSIUM 3.4 04/07/2025    CHLORIDE 101 04/07/2025    CO2 35 (H) 04/07/2025    ANIONGAP 7 04/07/2025     (H) 04/07/2025    BUN 30.4 (H) 04/07/2025    CR 0.90 04/07/2025    GFRESTIMATED 60 (L) 04/07/2025    HETAL 8.7 (L) 04/07/2025       Lab Results   Component Value Date    WBC 7.0 04/07/2025     Lab Results   Component Value Date    RBC 3.57 04/07/2025     Lab Results   Component Value Date    HGB 10.7 04/07/2025     Lab Results   Component Value Date    HCT 34.1 04/07/2025     No components found for: \"MCT\"  Lab Results   Component Value Date    MCV 96 04/07/2025     Lab Results   Component Value Date    MCH 30.0 04/07/2025     Lab Results   Component Value Date    MCHC 31.4 04/07/2025     Lab Results   Component Value Date    RDW 12.3 04/07/2025     Lab Results   Component Value Date     04/07/2025         ASSESSMENT/PLAN:    Acute Hypoxic Respiratory Failure, improved  Pneumonia and pneumonitis, resolved  ILD  Pulmonary HTN  Admitted for worsening hypoxia for 4 days with nonproductive cough. Requiring 4-5L NC on admission. CXR during previous admission with significant interstitial opacities B/L, unclear if underlying pre-existing ILD vs acute changes. Discharged on 2L NC a week prior to this admission. CT on admission with worsening groundglass opacities and fibrosis, along with pneumonitis. Pulmonary HTN seen as well. Rales diffusely on lung exam though related to likely interstitial lung disease. There was initially some concern for chronic aspiration, but patient passed SLP assessment including video swallow study. Patient has been vitally normal, no leukocytosis, " procal normal reassuring against new acute infection. Pulmonology was consulted. Treating as pneumonia and pneumonitis with antibiotics and steroids.   - Completed Augmentin and prednisone 4/9  - Follow up pulmonary clinic with CT non-contrast Chest and PFTs-scheduled for May   - Passed swallow study and video swallow study. No further concerns for dysphagia. Patient is feeding herself.  - Home O2 assessment complete, O2 sats 84% while awake at rest. Requiring 3L at rest. Has home O2 supplies already. Continue O2.   -PT/OT     HFpEF  Paroxysmal Atrial Fibrillation  Elevated troponin  Afib noted on chart review, rate controlled in TCU  Not on anticoagulation with history of recurrent bleeding.   Echocardiogram 2021 with LVEF 55-60%.   Monitor   Continue lasix  Weight today 138>133.4 pounds     Chronic T10, T12, L1, L3 Fractures  Noted on CXR, known to patient.  stable.   PT,OT     T2DM  Not on PTA meds  Monitor     Sacral Wound  Reports chronic sacral wounds. They are dressed. Painful.   WOC team  follow/continue dressing orders.  Offload pressure     Depression/Anxiety   PTA sertraline, buspar, mirtazapine    Chronic diarrhea   PTA PRN loperamide    HTN  not on PTA meds    /54       Electronically signed by:  Juana Cortez CNP         Total time greater than 35 minutes reviewing chart in EPIC and PCC, medications, labs, vitals. Discussing with social work, physical therapy, occupational therapy and nursing.

## 2025-04-23 ENCOUNTER — DISCHARGE SUMMARY NURSING HOME (OUTPATIENT)
Dept: GERIATRICS | Facility: CLINIC | Age: OVER 89
End: 2025-04-23
Payer: MEDICARE

## 2025-04-23 VITALS
DIASTOLIC BLOOD PRESSURE: 56 MMHG | WEIGHT: 132 LBS | BODY MASS INDEX: 23.39 KG/M2 | OXYGEN SATURATION: 95 % | RESPIRATION RATE: 18 BRPM | SYSTOLIC BLOOD PRESSURE: 109 MMHG | HEART RATE: 101 BPM | TEMPERATURE: 98.9 F | HEIGHT: 63 IN

## 2025-04-23 DIAGNOSIS — I50.9 CONGESTIVE HEART FAILURE, UNSPECIFIED HF CHRONICITY, UNSPECIFIED HEART FAILURE TYPE (H): ICD-10-CM

## 2025-04-23 DIAGNOSIS — R13.10 DYSPHAGIA, UNSPECIFIED TYPE: ICD-10-CM

## 2025-04-23 DIAGNOSIS — K21.00 GASTROESOPHAGEAL REFLUX DISEASE WITH ESOPHAGITIS WITHOUT HEMORRHAGE: ICD-10-CM

## 2025-04-23 DIAGNOSIS — G81.90: ICD-10-CM

## 2025-04-23 DIAGNOSIS — S06.5XAA SUBDURAL HEMATOMA (H): Primary | ICD-10-CM

## 2025-04-23 DIAGNOSIS — K52.9 CHRONIC DIARRHEA: ICD-10-CM

## 2025-04-23 DIAGNOSIS — L89.150 DECUBITUS ULCER OF COCCYGEAL REGION, UNSTAGEABLE (H): ICD-10-CM

## 2025-04-23 DIAGNOSIS — N30.01 ACUTE CYSTITIS WITH HEMATURIA: ICD-10-CM

## 2025-04-23 DIAGNOSIS — E11.22 TYPE 2 DIABETES MELLITUS WITH DIABETIC CHRONIC KIDNEY DISEASE, UNSPECIFIED CKD STAGE, UNSPECIFIED WHETHER LONG TERM INSULIN USE (H): ICD-10-CM

## 2025-04-23 DIAGNOSIS — G82.20 PARAPARESIS (H): ICD-10-CM

## 2025-04-23 DIAGNOSIS — J96.01 ACUTE RESPIRATORY FAILURE WITH HYPOXIA (H): ICD-10-CM

## 2025-04-23 DIAGNOSIS — J84.9 ILD (INTERSTITIAL LUNG DISEASE) (H): ICD-10-CM

## 2025-04-23 DIAGNOSIS — J47.0 BRONCHIECTASIS WITH ACUTE LOWER RESPIRATORY INFECTION (H): ICD-10-CM

## 2025-04-23 PROBLEM — R26.9 ABNORMAL GAIT: Status: ACTIVE | Noted: 2024-04-29

## 2025-04-23 PROBLEM — L30.4 INTERTRIGO: Status: ACTIVE | Noted: 2023-04-19

## 2025-04-23 PROBLEM — R25.1 TREMOR: Status: ACTIVE | Noted: 2024-09-24

## 2025-04-23 PROBLEM — I12.9 BENIGN HYPERTENSIVE CKD: Status: ACTIVE | Noted: 2025-01-15

## 2025-04-23 PROBLEM — J84.10 FIBROTIC LUNG DISEASES (H): Status: ACTIVE | Noted: 2025-01-15

## 2025-04-23 PROBLEM — N32.81 OVERACTIVE BLADDER: Status: ACTIVE | Noted: 2024-01-24

## 2025-04-23 PROBLEM — I70.0 AORTIC ATHEROSCLEROSIS: Status: ACTIVE | Noted: 2025-01-15

## 2025-04-23 PROBLEM — R41.0 CONFUSION: Status: ACTIVE | Noted: 2022-12-21

## 2025-04-23 PROBLEM — H69.90 EUSTACHIAN TUBE DYSFUNCTION: Status: ACTIVE | Noted: 2024-11-20

## 2025-04-23 PROBLEM — R14.2 ERUCTATION: Status: ACTIVE | Noted: 2023-04-19

## 2025-04-23 PROBLEM — U07.1 COVID-19 VIRUS INFECTION: Status: ACTIVE | Noted: 2023-11-22

## 2025-04-23 PROBLEM — H04.129 CHRONICALLY DRY EYES: Status: ACTIVE | Noted: 2024-11-20

## 2025-04-23 PROBLEM — S98.139A AMPUTATION OF TOE: Status: ACTIVE | Noted: 2025-01-15

## 2025-04-23 PROBLEM — I82.409 DVT (DEEP VENOUS THROMBOSIS) (H): Status: ACTIVE | Noted: 2024-01-24

## 2025-04-23 PROBLEM — B35.1 ONYCHOMYCOSIS: Status: ACTIVE | Noted: 2024-01-24

## 2025-04-23 PROBLEM — I47.10 SUPRAVENTRICULAR TACHYCARDIA: Status: ACTIVE | Noted: 2024-01-24

## 2025-04-23 PROBLEM — Z86.79 HISTORY OF SUBDURAL HEMATOMA: Status: ACTIVE | Noted: 2024-01-24

## 2025-04-23 PROBLEM — H61.23 IMPACTED CERUMEN, BILATERAL: Status: ACTIVE | Noted: 2024-01-24

## 2025-04-23 PROBLEM — F39 EPISODIC MOOD DISORDER: Status: ACTIVE | Noted: 2025-01-15

## 2025-04-23 PROBLEM — S32.010A COMPRESSION FRACTURE OF L1 LUMBAR VERTEBRA (H): Status: ACTIVE | Noted: 2025-01-15

## 2025-04-23 PROBLEM — R29.818 IMPAIRED PROPRIOCEPTION: Status: ACTIVE | Noted: 2023-12-20

## 2025-04-23 PROBLEM — E11.65 DIABETES MELLITUS WITH HYPERGLYCEMIA (H): Status: ACTIVE | Noted: 2025-01-15

## 2025-04-23 PROCEDURE — 99316 NF DSCHRG MGMT 30 MIN+: CPT | Performed by: NURSE PRACTITIONER

## 2025-04-23 NOTE — LETTER
4/23/2025      Maranda Downing  [address]  [facility] [address]        [facility] GERIATRICS DISCHARGE SUMMARY  PATIENT'S NAME: Maranda Downing  YOB: 1935  MEDICAL RECORD NUMBER:  [id number]  Place of Service where encounter took place:  [facility] ([facility]) [[id number]]    PRIMARY CARE PROVIDER AND CLINIC RESPONSIBLE AFTER TRANSFER:   Joy López MD, [facility] [address] / [place]   Nursing Home: [facility]      Transferring providers: Juana Cortez CNP, Cherie Carter MD  Recent Hospitalization/ED:  [facility] stay 4/2/2025 to 4/7/2025.  Date of SNF Admission:  4/7/2025  Date of [facility] (anticipated) Discharge:  4/25/2025  Discharged to: previous assisted living  Cognitive Scores:  See therapy scores   Physical Function:  see therapy notes   DME: No new DME needed    CODE STATUS/ADVANCE DIRECTIVES DISCUSSION:  No CPR- Do NOT Intubate   ALLERGIES: Codeine, Hydrocodone, Iodinated contrast media, Levofloxacin, Lisinopril, Morphine, Sulfa antibiotics, and Penicillins    NURSING FACILITY COURSE   Medication Changes/Rationale:   See below     Summary of nursing facility stay:   Acute Hypoxic Respiratory Failure, improved  Pneumonia and pneumonitis, resolved  ILD  Pulmonary HTN  Admitted for worsening hypoxia for 4 days with nonproductive cough. Requiring 4-5L NC on admission. CXR during previous admission with significant interstitial opacities B/L, unclear if underlying pre-existing ILD vs acute changes. Discharged on 2L NC a week prior to this admission. CT on admission with worsening groundglass opacities and fibrosis, along with pneumonitis. Pulmonary HTN seen as well. Rales diffusely on lung exam though related to likely interstitial lung disease. There was initially some concern for chronic aspiration, but patient passed SLP assessment including video swallow study. Patient has  been vitally normal, no leukocytosis, procal normal reassuring against new acute infection. Pulmonology was consulted. Treating as pneumonia and pneumonitis with antibiotics and steroids.   - Completed Augmentin and prednisone 4/9  - Follow up pulmonary clinic with CT non-contrast Chest and PFTs-scheduled for May   - Passed swallow study and video swallow study. No further concerns for dysphagia. Patient is feeding herself.  - Home O2 assessment complete, O2 sats 84% while awake at rest. Requiring 3L at rest. Has home O2 supplies already. Continue O2.   -PT/OT     HFpEF  Paroxysmal Atrial Fibrillation  Elevated troponin  Afib noted on chart review, rate controlled in TCU  Not on anticoagulation with history of recurrent bleeding.   Echocardiogram 2021 with LVEF 55-60%.   Monitor   Continue lasix  Weight today 138>133.4>132 pounds     Chronic T10, T12, L1, L3 Fractures  Noted on CXR, known to patient.  stable.   PT,OT     T2DM  Not on PTA meds  Monitor     Sacral Wound  Reports chronic sacral wounds. They are dressed. Painful.   WOC team  follow/continue dressing orders.  Offload pressure     Depression/Anxiety   PTA sertraline, buspar, mirtazapine     Chronic diarrhea   PTA PRN loperamide     HTN  not on PTA meds    /54         Discharge Medications:  MED REC REQUIRED  Post Medication Reconciliation Status: discharge medications reconciled, continue medications without change       Current Outpatient Medications   Medication Sig Dispense Refill     acetaminophen (TYLENOL) 500 MG tablet Take 1,000 mg by mouth 2 times daily.       acetaminophen (TYLENOL) 500 MG tablet Take 1,000 mg by mouth 3 times daily as needed for mild pain.       alendronate (FOSAMAX) 70 MG tablet Take 70 mg by mouth every 7 days.       amoxicillin-clavulanate (AUGMENTIN) 500-125 MG tablet Take 1 tablet by mouth every 12 hours. 4 tablet 0     busPIRone (BUSPAR) 10 MG tablet Take 10 mg by mouth 2 times daily.       cholecalciferol (VITAMIN  "D3) 25 mcg (1000 units) capsule Take 1 capsule by mouth daily.       estradiol (ESTRACE) 0.1 MG/GM vaginal cream Place vaginally three times a week. MWF at bedtime (self-administered by patient)       fexofenadine (ALLEGRA) 180 MG tablet Take 180 mg by mouth daily as needed for allergies.       fluticasone (FLONASE) 50 MCG/ACT nasal spray Spray 2 sprays into both nostrils daily as needed for allergies.       furosemide (LASIX) 20 MG tablet Take 3 tablets (60 mg) by mouth daily.       gabapentin (NEURONTIN) 100 MG capsule Take 100 mg by mouth 2 times daily.       ketoconazole (NIZORAL) 2 % external cream Apply topically 2 times daily as needed for itching.       loperamide (IMODIUM) 2 MG capsule Take 2 mg by mouth every morning.       loperamide (IMODIUM) 2 MG capsule Take 2 mg by mouth 3 times daily as needed for diarrhea       mirtazapine (REMERON) 7.5 MG tablet Take 7.5 mg by mouth at bedtime.       nystatin (MYCOSTATIN) 589303 UNIT/GM external powder Apply topically 2 times daily as needed for dry skin.       polyethylene glycol-propylene glycol (SYSTANE ULTRA) 0.4-0.3 % SOLN ophthalmic solution Place 2 drops into both eyes 2 times daily.       potassium chloride ER (K-TAB/KLOR-CON) 10 MEQ CR tablet Take 10 mEq by mouth 2 times daily.       sertraline (ZOLOFT) 100 MG tablet Take 150 mg by mouth daily.       zinc oxide (DESITIN) 20 % external ointment Apply topically 2 times daily as needed for dry skin or irritation (coccyx).            Controlled medications:   not applicable/none     Past Medical History: History reviewed. No pertinent past medical history.  Physical Exam:   Vitals: /56   Pulse 101   Temp 98.9  F (37.2  C)   Resp 18   Ht 1.6 m (5' 3\")   Wt 59.9 kg (132 lb)   SpO2 95%   BMI 23.38 kg/m    BMI: Body mass index is 23.38 kg/m .  GENERAL APPEARANCE:  Alert, in no distress, oriented, cooperative  ENT:  Mouth and posterior oropharynx normal, moist mucous membranes  EYES:  EOM, " conjunctivae, lids, pupils and irises normal  NECK:  No adenopathy,masses or thyromegaly  RESP:  respiratory effort and palpation of chest normal, lungs clear to auscultation , no respiratory distress. Wearing O2  CV:  Palpation and auscultation of heart done , regular rate and rhythm, no murmur, rub, or gallop, no edema to LE  GI/ABDOMEN:  normal bowel sounds, soft, nontender, no hepatosplenomegaly or other masses  M/S:   moves extremities spontaneously. Ambulation not tested   SKIN:  no rashes to exposed skin.   NEURO:   intact   PSYCH:  oriented X 3, normal insight, judgement and memory, affect and mood normal,        SNF labs: Recent labs in [facility] reviewed by me today.         Last Comprehensive Metabolic Panel:  Lab Results   Component Value Date     04/07/2025    POTASSIUM 3.4 04/07/2025    CHLORIDE 101 04/07/2025    CO2 35 (H) 04/07/2025    ANIONGAP 7 04/07/2025     (H) 04/07/2025    BUN 30.4 (H) 04/07/2025    CR 0.90 04/07/2025    GFRESTIMATED 60 (L) 04/07/2025    HETAL 8.7 (L) 04/07/2025       Lab Results   Component Value Date    WBC 7.0 04/07/2025     Lab Results   Component Value Date    RBC 3.57 04/07/2025     Lab Results   Component Value Date    HGB 10.7 04/07/2025     Lab Results   Component Value Date    HCT 34.1 04/07/2025     Lab Results   Component Value Date    MCV 96 04/07/2025     Lab Results   Component Value Date    MCH 30.0 04/07/2025     Lab Results   Component Value Date    MCHC 31.4 04/07/2025     Lab Results   Component Value Date    RDW 12.3 04/07/2025     Lab Results   Component Value Date     04/07/2025       DISCHARGE PLAN:  Follow up labs: As directed by PCP  Medical Follow Up:      Follow up with primary care provider in 1 weeks  [facility] scheduled appointments:  Appointments in Next Year      Apr 23, 2025 8:00 AM  Discharge Summary with Juana Cortez CNP  [facility] ([facility] ) [phone]     May 14,  2025 1:20 PM  (Arrive by 1:05 PM)  CT CHEST W/O CONTRAST with MICCT1  [facility] [place] ([facility]) [phone]     May 16, 2025 2:45 PM  (Arrive by 2:30 PM)  Return Patient with Aaron Bell MD  [facility] ([facility]) [phone]           Discharge Services: Home Care:  Occupational Therapy, Physical Therapy, Registered Nurse, and Home Health Aide  Discharge Instructions Verbalized to Patient at Discharge:   Weigh yourself daily in the morning and keep a record. Call your primary clinic: a) if you are more short of breath, or b) if your weight changes more than 3 pounds in one day or more than 5 pounds in one week.     TOTAL DISCHARGE TIME:   Greater than 30 minutes  Electronically signed by:  Juana Cortez CNP     Documentation of Face to Face and Certification for Home Health Services    I certify that services are/were furnished while this patient was under the care of a physician and that a physician or an allowed non-physician practitioner (NPP), had a face-to-face encounter that meets the physician face-to-face encounter requirements. The encounter was in whole, or in part, related to the primary reason for home health. The patient is confined to his/her home and needs intermittent skilled nursing, physical therapy, speech-language pathology, or the continued need for occupational therapy. A plan of care has been established by a physician and is periodically reviewed by a physician.  Date of Face-to-Face Encounter: 4/23/2025.    I certify that, based on my findings, the following services are medically necessary home health services: Nursing, Occupational Therapy, Physical Therapy, and HHA .    My clinical findings support the need for the above skilled services because: Requires assistance of another person or specialized equipment to access medical services because patient: Requires  supervision of another for safe transfer...    Patient to re-establish plan of care with their PCP within 7-10 days after leaving the facility to reestablish care.  Medicare certified PECOS provider: Juana Cortez CNP  Date: April 23, 2025              Sincerely,        Juana Cortez CNP    Electronically signed

## 2025-04-23 NOTE — PROGRESS NOTES
[facility] GERIATRICS DISCHARGE SUMMARY  PATIENT'S NAME: Maranda Downing  YOB: 1935  MEDICAL RECORD NUMBER:  [id number]  Place of Service where encounter took place:  [facility] ([facility]) [[id number]]    PRIMARY CARE PROVIDER AND CLINIC RESPONSIBLE AFTER TRANSFER:   Joy López MD, [facility] PHYSICIANS SERVICES [address] / [place]   Nursing Home: [facility]      Transferring providers: Juana Cortez CNP, Cherie Carter MD  Recent Hospitalization/ED:  [facility] stay 4/2/2025 to 4/7/2025.  Date of SNF Admission:  4/7/2025  Date of [facility] (anticipated) Discharge:  4/25/2025  Discharged to: previous assisted living  Cognitive Scores:  See therapy scores   Physical Function:  see therapy notes   DME: No new DME needed    CODE STATUS/ADVANCE DIRECTIVES DISCUSSION:  No CPR- Do NOT Intubate   ALLERGIES: Codeine, Hydrocodone, Iodinated contrast media, Levofloxacin, Lisinopril, Morphine, Sulfa antibiotics, and Penicillins    NURSING FACILITY COURSE   Medication Changes/Rationale:   See below     Summary of nursing facility stay:   Acute Hypoxic Respiratory Failure, improved  Pneumonia and pneumonitis, resolved  ILD  Pulmonary HTN  Admitted for worsening hypoxia for 4 days with nonproductive cough. Requiring 4-5L NC on admission. CXR during previous admission with significant interstitial opacities B/L, unclear if underlying pre-existing ILD vs acute changes. Discharged on 2L NC a week prior to this admission. CT on admission with worsening groundglass opacities and fibrosis, along with pneumonitis. Pulmonary HTN seen as well. Rales diffusely on lung exam though related to likely interstitial lung disease. There was initially some concern for chronic aspiration, but patient passed SLP assessment including video swallow study. Patient has been vitally normal, no leukocytosis, procal normal reassuring against new acute  infection. Pulmonology was consulted. Treating as pneumonia and pneumonitis with antibiotics and steroids.   - Completed Augmentin and prednisone 4/9  - Follow up pulmonary clinic with CT non-contrast Chest and PFTs-scheduled for May   - Passed swallow study and video swallow study. No further concerns for dysphagia. Patient is feeding herself.  - Home O2 assessment complete, O2 sats 84% while awake at rest. Requiring 3L at rest. Has home O2 supplies already. Continue O2.   -PT/OT     HFpEF  Paroxysmal Atrial Fibrillation  Elevated troponin  Afib noted on chart review, rate controlled in TCU  Not on anticoagulation with history of recurrent bleeding.   Echocardiogram 2021 with LVEF 55-60%.   Monitor   Continue lasix  Weight today 138>133.4>132 pounds     Chronic T10, T12, L1, L3 Fractures  Noted on CXR, known to patient.  stable.   PT,OT     T2DM  Not on PTA meds  Monitor     Sacral Wound  Reports chronic sacral wounds. They are dressed. Painful.   WOC team  follow/continue dressing orders.  Offload pressure     Depression/Anxiety   PTA sertraline, buspar, mirtazapine     Chronic diarrhea   PTA PRN loperamide     HTN  not on PTA meds    /54         Discharge Medications:  MED REC REQUIRED  Post Medication Reconciliation Status: discharge medications reconciled, continue medications without change       Current Outpatient Medications   Medication Sig Dispense Refill    acetaminophen (TYLENOL) 500 MG tablet Take 1,000 mg by mouth 2 times daily.      acetaminophen (TYLENOL) 500 MG tablet Take 1,000 mg by mouth 3 times daily as needed for mild pain.      alendronate (FOSAMAX) 70 MG tablet Take 70 mg by mouth every 7 days.      amoxicillin-clavulanate (AUGMENTIN) 500-125 MG tablet Take 1 tablet by mouth every 12 hours. 4 tablet 0    busPIRone (BUSPAR) 10 MG tablet Take 10 mg by mouth 2 times daily.      cholecalciferol (VITAMIN D3) 25 mcg (1000 units) capsule Take 1 capsule by mouth daily.      estradiol (ESTRACE)  "0.1 MG/GM vaginal cream Place vaginally three times a week. MWF at bedtime (self-administered by patient)      fexofenadine (ALLEGRA) 180 MG tablet Take 180 mg by mouth daily as needed for allergies.      fluticasone (FLONASE) 50 MCG/ACT nasal spray Spray 2 sprays into both nostrils daily as needed for allergies.      furosemide (LASIX) 20 MG tablet Take 3 tablets (60 mg) by mouth daily.      gabapentin (NEURONTIN) 100 MG capsule Take 100 mg by mouth 2 times daily.      ketoconazole (NIZORAL) 2 % external cream Apply topically 2 times daily as needed for itching.      loperamide (IMODIUM) 2 MG capsule Take 2 mg by mouth every morning.      loperamide (IMODIUM) 2 MG capsule Take 2 mg by mouth 3 times daily as needed for diarrhea      mirtazapine (REMERON) 7.5 MG tablet Take 7.5 mg by mouth at bedtime.      nystatin (MYCOSTATIN) 232509 UNIT/GM external powder Apply topically 2 times daily as needed for dry skin.      polyethylene glycol-propylene glycol (SYSTANE ULTRA) 0.4-0.3 % SOLN ophthalmic solution Place 2 drops into both eyes 2 times daily.      potassium chloride ER (K-TAB/KLOR-CON) 10 MEQ CR tablet Take 10 mEq by mouth 2 times daily.      sertraline (ZOLOFT) 100 MG tablet Take 150 mg by mouth daily.      zinc oxide (DESITIN) 20 % external ointment Apply topically 2 times daily as needed for dry skin or irritation (coccyx).            Controlled medications:   not applicable/none     Past Medical History: History reviewed. No pertinent past medical history.  Physical Exam:   Vitals: /56   Pulse 101   Temp 98.9  F (37.2  C)   Resp 18   Ht 1.6 m (5' 3\")   Wt 59.9 kg (132 lb)   SpO2 95%   BMI 23.38 kg/m    BMI: Body mass index is 23.38 kg/m .  GENERAL APPEARANCE:  Alert, in no distress, oriented, cooperative  ENT:  Mouth and posterior oropharynx normal, moist mucous membranes  EYES:  EOM, conjunctivae, lids, pupils and irises normal  NECK:  No adenopathy,masses or thyromegaly  RESP:  respiratory " effort and palpation of chest normal, lungs clear to auscultation , no respiratory distress. Wearing O2  CV:  Palpation and auscultation of heart done , regular rate and rhythm, no murmur, rub, or gallop, no edema to LE  GI/ABDOMEN:  normal bowel sounds, soft, nontender, no hepatosplenomegaly or other masses  M/S:   moves extremities spontaneously. Ambulation not tested   SKIN:  no rashes to exposed skin.   NEURO:   intact   PSYCH:  oriented X 3, normal insight, judgement and memory, affect and mood normal,        SNF labs: Recent labs in [facility] reviewed by me today.         Last Comprehensive Metabolic Panel:  Lab Results   Component Value Date     04/07/2025    POTASSIUM 3.4 04/07/2025    CHLORIDE 101 04/07/2025    CO2 35 (H) 04/07/2025    ANIONGAP 7 04/07/2025     (H) 04/07/2025    BUN 30.4 (H) 04/07/2025    CR 0.90 04/07/2025    GFRESTIMATED 60 (L) 04/07/2025    HETAL 8.7 (L) 04/07/2025       Lab Results   Component Value Date    WBC 7.0 04/07/2025     Lab Results   Component Value Date    RBC 3.57 04/07/2025     Lab Results   Component Value Date    HGB 10.7 04/07/2025     Lab Results   Component Value Date    HCT 34.1 04/07/2025     Lab Results   Component Value Date    MCV 96 04/07/2025     Lab Results   Component Value Date    MCH 30.0 04/07/2025     Lab Results   Component Value Date    MCHC 31.4 04/07/2025     Lab Results   Component Value Date    RDW 12.3 04/07/2025     Lab Results   Component Value Date     04/07/2025       DISCHARGE PLAN:  Follow up labs: As directed by PCP  Medical Follow Up:      Follow up with primary care provider in 1 weeks  [facility] scheduled appointments:  Appointments in Next Year      Apr 23, 2025 8:00 AM  Discharge Summary with Juana Cortez CNP  [facility] ([facility] ) [phone]     May 14, 2025 1:20 PM  (Arrive by 1:05 PM)  CT CHEST W/O CONTRAST with MICCT1  [facility]  [place] ([facility]) [phone]     May 16, 2025 2:45 PM  (Arrive by 2:30 PM)  Return Patient with Aaron Bell MD  [facility] ([facility]) [phone]           Discharge Services: Home Care:  Occupational Therapy, Physical Therapy, Registered Nurse, and Home Health Aide  Discharge Instructions Verbalized to Patient at Discharge:   Weigh yourself daily in the morning and keep a record. Call your primary clinic: a) if you are more short of breath, or b) if your weight changes more than 3 pounds in one day or more than 5 pounds in one week.     TOTAL DISCHARGE TIME:   Greater than 30 minutes  Electronically signed by:  Juana Cortez CNP     Documentation of Face to Face and Certification for Home Health Services    I certify that services are/were furnished while this patient was under the care of a physician and that a physician or an allowed non-physician practitioner (NPP), had a face-to-face encounter that meets the physician face-to-face encounter requirements. The encounter was in whole, or in part, related to the primary reason for home health. The patient is confined to his/her home and needs intermittent skilled nursing, physical therapy, speech-language pathology, or the continued need for occupational therapy. A plan of care has been established by a physician and is periodically reviewed by a physician.  Date of Face-to-Face Encounter: 4/23/2025.    I certify that, based on my findings, the following services are medically necessary home health services: Nursing, Occupational Therapy, Physical Therapy, and HHA .    My clinical findings support the need for the above skilled services because: Requires assistance of another person or specialized equipment to access medical services because patient: Requires supervision of another for safe transfer...    Patient to re-establish plan of care with their PCP within 7-10  days after leaving the facility to reestablish care.  Medicare certified PECOS provider: Juana Cortez CNP  Date: April 23, 2025

## 2025-05-14 ENCOUNTER — HOSPITAL ENCOUNTER (OUTPATIENT)
Dept: CT IMAGING | Facility: HOSPITAL | Age: OVER 89
Discharge: HOME | End: 2025-05-14
Attending: INTERNAL MEDICINE
Payer: MEDICARE

## 2025-05-14 DIAGNOSIS — J47.0 BRONCHIECTASIS WITH ACUTE LOWER RESPIRATORY INFECTION (H): ICD-10-CM

## 2025-05-14 DIAGNOSIS — J84.112 IPF (IDIOPATHIC PULMONARY FIBROSIS) (H): ICD-10-CM

## 2025-05-14 PROCEDURE — 71250 CT THORAX DX C-: CPT

## 2025-05-15 ENCOUNTER — LAB REQUISITION (OUTPATIENT)
Dept: LAB | Facility: CLINIC | Age: OVER 89
End: 2025-05-15
Payer: MEDICARE

## 2025-05-15 DIAGNOSIS — R41.89 OTHER SYMPTOMS AND SIGNS INVOLVING COGNITIVE FUNCTIONS AND AWARENESS: ICD-10-CM

## 2025-05-15 DIAGNOSIS — M81.0 AGE-RELATED OSTEOPOROSIS WITHOUT CURRENT PATHOLOGICAL FRACTURE: ICD-10-CM

## 2025-05-16 ENCOUNTER — TELEPHONE (OUTPATIENT)
Dept: PULMONOLOGY | Facility: CLINIC | Age: OVER 89
End: 2025-05-16

## 2025-05-16 PROBLEM — J96.11 CHRONIC RESPIRATORY FAILURE WITH HYPOXIA (H): Status: ACTIVE | Noted: 2025-05-16

## 2025-05-16 PROBLEM — J84.112 IPF (IDIOPATHIC PULMONARY FIBROSIS) (H): Status: ACTIVE | Noted: 2025-01-15

## 2025-05-19 LAB
ERYTHROCYTE [DISTWIDTH] IN BLOOD BY AUTOMATED COUNT: 14.5 % (ref 10–15)
GLUCOSE SERPL-MCNC: 119 MG/DL (ref 70–99)
HCT VFR BLD AUTO: 33.2 % (ref 35–47)
HGB BLD-MCNC: 9.9 G/DL (ref 11.7–15.7)
MCH RBC QN AUTO: 30.3 PG (ref 26.5–33)
MCHC RBC AUTO-ENTMCNC: 29.8 G/DL (ref 31.5–36.5)
MCV RBC AUTO: 102 FL (ref 78–100)
PLATELET # BLD AUTO: 172 10E3/UL (ref 150–450)
RBC # BLD AUTO: 3.27 10E6/UL (ref 3.8–5.2)
WBC # BLD AUTO: 6.3 10E3/UL (ref 4–11)

## 2025-05-19 NOTE — TELEPHONE ENCOUNTER
DATE:  5/16/25  DME PROVIDER:  GREGORY  SUPPLY ORDERED:  OXYGEN/SUPPLIES  PROVIDER:  BRIDGER DIAZ MD    COMMENT:  5:03 PM    *Cover sheet  *Oxygen/Supplies orders  *Office notes    All the above faxed to [facility] at [phone].    Peter TEMPLE CMA [facility]

## 2025-05-20 LAB
ALBUMIN SERPL BCG-MCNC: 3.8 G/DL (ref 3.5–5.2)
ALP SERPL-CCNC: 72 U/L (ref 40–150)
ALT SERPL W P-5'-P-CCNC: 8 U/L (ref 0–50)
ANION GAP SERPL CALCULATED.3IONS-SCNC: 12 MMOL/L (ref 7–15)
AST SERPL W P-5'-P-CCNC: 24 U/L (ref 0–45)
BILIRUB SERPL-MCNC: 0.5 MG/DL
BUN SERPL-MCNC: 14 MG/DL (ref 8–23)
CALCIUM SERPL-MCNC: 9.4 MG/DL (ref 8.8–10.4)
CHLORIDE SERPL-SCNC: 102 MMOL/L (ref 98–107)
CREAT SERPL-MCNC: 0.8 MG/DL (ref 0.51–0.95)
EGFRCR SERPLBLD CKD-EPI 2021: 70 ML/MIN/1.73M2
HCO3 SERPL-SCNC: 30 MMOL/L (ref 22–29)
POTASSIUM SERPL-SCNC: 3.9 MMOL/L (ref 3.4–5.3)
PROT SERPL-MCNC: 6.6 G/DL (ref 6.4–8.3)
SODIUM SERPL-SCNC: 144 MMOL/L (ref 135–145)
TSH SERPL DL<=0.005 MIU/L-ACNC: 1.96 UIU/ML (ref 0.3–4.2)
VIT B12 SERPL-MCNC: 553 PG/ML (ref 232–1245)
VIT D+METAB SERPL-MCNC: 45 NG/ML (ref 20–50)

## 2025-05-23 ENCOUNTER — LAB REQUISITION (OUTPATIENT)
Dept: LAB | Facility: CLINIC | Age: OVER 89
End: 2025-05-23
Payer: MEDICARE

## 2025-05-23 DIAGNOSIS — R30.0 DYSURIA: ICD-10-CM

## 2025-05-23 LAB
ALBUMIN UR-MCNC: 10 MG/DL
APPEARANCE UR: ABNORMAL
BACTERIA #/AREA URNS HPF: ABNORMAL /HPF
BILIRUB UR QL STRIP: NEGATIVE
COLOR UR AUTO: YELLOW
GLUCOSE UR STRIP-MCNC: NEGATIVE MG/DL
HGB UR QL STRIP: ABNORMAL
HYALINE CASTS: 4 /LPF
KETONES UR STRIP-MCNC: NEGATIVE MG/DL
LEUKOCYTE ESTERASE UR QL STRIP: ABNORMAL
MUCOUS THREADS #/AREA URNS LPF: PRESENT /LPF
NITRATE UR QL: POSITIVE
PH UR STRIP: 5.5 [PH] (ref 5–7)
RBC URINE: <1 /HPF
SP GR UR STRIP: 1.02 (ref 1–1.03)
SQUAMOUS EPITHELIAL: 1 /HPF
UROBILINOGEN UR STRIP-MCNC: NORMAL MG/DL
WBC URINE: 8 /HPF

## 2025-05-23 PROCEDURE — 87186 SC STD MICRODIL/AGAR DIL: CPT | Mod: ORL

## 2025-05-23 PROCEDURE — 81001 URINALYSIS AUTO W/SCOPE: CPT | Mod: ORL

## 2025-05-26 LAB
BACTERIA UR CULT: ABNORMAL
BACTERIA UR CULT: ABNORMAL

## 2025-05-27 ENCOUNTER — LAB REQUISITION (OUTPATIENT)
Dept: LAB | Facility: CLINIC | Age: OVER 89
End: 2025-05-27
Payer: MEDICARE

## 2025-05-27 DIAGNOSIS — D64.9 ANEMIA, UNSPECIFIED: ICD-10-CM

## 2025-05-27 DIAGNOSIS — D50.8 OTHER IRON DEFICIENCY ANEMIAS: ICD-10-CM

## 2025-05-28 LAB — HEMOCCULT STL QL IA: NEGATIVE

## 2025-06-02 LAB
BASOPHILS # BLD AUTO: 0 10E3/UL (ref 0–0.2)
BASOPHILS NFR BLD AUTO: 0 %
EOSINOPHIL # BLD AUTO: 0.2 10E3/UL (ref 0–0.7)
EOSINOPHIL NFR BLD AUTO: 3 %
ERYTHROCYTE [DISTWIDTH] IN BLOOD BY AUTOMATED COUNT: 15 % (ref 10–15)
FERRITIN SERPL-MCNC: 137 NG/ML (ref 11–328)
FOLATE SERPL-MCNC: 4.7 NG/ML (ref 4.6–34.8)
HCT VFR BLD AUTO: 32.4 % (ref 35–47)
HGB BLD-MCNC: 9.6 G/DL (ref 11.7–15.7)
IMM GRANULOCYTES # BLD: 0 10E3/UL
IMM GRANULOCYTES NFR BLD: 0 %
IRON BINDING CAPACITY (ROCHE): 247 UG/DL (ref 240–430)
IRON SATN MFR SERPL: 14 % (ref 15–46)
IRON SERPL-MCNC: 34 UG/DL (ref 37–145)
LYMPHOCYTES # BLD AUTO: 0.9 10E3/UL (ref 0.8–5.3)
LYMPHOCYTES NFR BLD AUTO: 17 %
MCH RBC QN AUTO: 29.4 PG (ref 26.5–33)
MCHC RBC AUTO-ENTMCNC: 29.6 G/DL (ref 31.5–36.5)
MCV RBC AUTO: 99 FL (ref 78–100)
MONOCYTES # BLD AUTO: 0.3 10E3/UL (ref 0–1.3)
MONOCYTES NFR BLD AUTO: 5 %
NEUTROPHILS # BLD AUTO: 4.1 10E3/UL (ref 1.6–8.3)
NEUTROPHILS NFR BLD AUTO: 74 %
NRBC # BLD AUTO: 0 10E3/UL
NRBC BLD AUTO-RTO: 0 /100
PLATELET # BLD AUTO: 166 10E3/UL (ref 150–450)
RBC # BLD AUTO: 3.26 10E6/UL (ref 3.8–5.2)
RETICS # AUTO: 0.08 10E6/UL (ref 0.03–0.1)
RETICS/RBC NFR AUTO: 2.5 % (ref 0.5–2)
WBC # BLD AUTO: 5.6 10E3/UL (ref 4–11)

## 2025-07-10 ENCOUNTER — LAB REQUISITION (OUTPATIENT)
Dept: LAB | Facility: CLINIC | Age: OVER 89
End: 2025-07-10
Payer: MEDICARE

## 2025-07-10 DIAGNOSIS — R30.0 DYSURIA: ICD-10-CM

## 2025-07-10 LAB
ALBUMIN UR-MCNC: NEGATIVE MG/DL
APPEARANCE UR: CLEAR
BILIRUB UR QL STRIP: NEGATIVE
COLOR UR AUTO: ABNORMAL
GLUCOSE UR STRIP-MCNC: NEGATIVE MG/DL
HGB UR QL STRIP: ABNORMAL
HYALINE CASTS: 7 /LPF
KETONES UR STRIP-MCNC: NEGATIVE MG/DL
LEUKOCYTE ESTERASE UR QL STRIP: NEGATIVE
MUCOUS THREADS #/AREA URNS LPF: PRESENT /LPF
NITRATE UR QL: NEGATIVE
PH UR STRIP: 5 [PH] (ref 5–7)
RBC URINE: 1 /HPF
SP GR UR STRIP: 1.01 (ref 1–1.03)
SQUAMOUS EPITHELIAL: 1 /HPF
UROBILINOGEN UR STRIP-MCNC: NORMAL MG/DL
WBC URINE: 1 /HPF

## 2025-07-10 PROCEDURE — 87086 URINE CULTURE/COLONY COUNT: CPT | Mod: ORL | Performed by: INTERNAL MEDICINE

## 2025-07-10 PROCEDURE — 81001 URINALYSIS AUTO W/SCOPE: CPT | Mod: ORL | Performed by: INTERNAL MEDICINE

## 2025-07-11 LAB — BACTERIA UR CULT: NORMAL
